# Patient Record
Sex: FEMALE | Race: WHITE | NOT HISPANIC OR LATINO | Employment: UNEMPLOYED | URBAN - NONMETROPOLITAN AREA
[De-identification: names, ages, dates, MRNs, and addresses within clinical notes are randomized per-mention and may not be internally consistent; named-entity substitution may affect disease eponyms.]

---

## 2023-10-26 ENCOUNTER — OFFICE VISIT (OUTPATIENT)
Dept: PULMONOLOGY | Facility: CLINIC | Age: 43
End: 2023-10-26
Payer: COMMERCIAL

## 2023-10-26 VITALS
BODY MASS INDEX: 40.02 KG/M2 | DIASTOLIC BLOOD PRESSURE: 62 MMHG | HEART RATE: 97 BPM | SYSTOLIC BLOOD PRESSURE: 108 MMHG | HEIGHT: 61 IN | WEIGHT: 212 LBS | TEMPERATURE: 98 F | OXYGEN SATURATION: 98 %

## 2023-10-26 DIAGNOSIS — Z23 IMMUNIZATION DUE: ICD-10-CM

## 2023-10-26 DIAGNOSIS — R06.02 SHORTNESS OF BREATH: Primary | ICD-10-CM

## 2023-10-26 DIAGNOSIS — F17.210 CIGARETTE NICOTINE DEPENDENCE WITHOUT COMPLICATION: ICD-10-CM

## 2023-10-26 RX ORDER — OFATUMUMAB 20 MG/.4ML
20 INJECTION, SOLUTION SUBCUTANEOUS
COMMUNITY
Start: 2023-10-09

## 2023-10-26 RX ORDER — TIZANIDINE 4 MG/1
TABLET ORAL
COMMUNITY

## 2023-10-26 RX ORDER — DALFAMPRIDINE 10 MG/1
10 TABLET, FILM COATED, EXTENDED RELEASE ORAL
COMMUNITY
Start: 2023-07-10

## 2023-10-26 RX ORDER — MELOXICAM 7.5 MG/1
7.5 TABLET ORAL DAILY
COMMUNITY
Start: 2023-10-03

## 2023-10-26 RX ORDER — FLUOXETINE HYDROCHLORIDE 20 MG/1
1 CAPSULE ORAL DAILY
COMMUNITY
Start: 2023-08-29

## 2023-10-26 RX ORDER — MODAFINIL 200 MG/1
200 TABLET ORAL DAILY
COMMUNITY
Start: 2023-10-09

## 2023-10-26 RX ORDER — POTASSIUM CHLORIDE 20 MEQ/1
20 TABLET, EXTENDED RELEASE ORAL DAILY
COMMUNITY
Start: 2023-10-20 | End: 2023-11-19

## 2023-10-26 RX ORDER — GABAPENTIN 800 MG/1
TABLET ORAL
COMMUNITY

## 2023-10-26 RX ORDER — DILTIAZEM HYDROCHLORIDE 60 MG/1
2 TABLET, FILM COATED ORAL
COMMUNITY
Start: 2023-09-14

## 2023-10-26 RX ORDER — CHOLECALCIFEROL (VITAMIN D3) 125 MCG
1 CAPSULE ORAL DAILY
COMMUNITY
Start: 2023-08-02

## 2023-10-26 RX ORDER — FUROSEMIDE 20 MG/1
20 TABLET ORAL DAILY
COMMUNITY
Start: 2023-10-20 | End: 2023-11-19

## 2023-10-26 RX ORDER — CARIPRAZINE 1.5 MG/1
CAPSULE, GELATIN COATED ORAL
COMMUNITY
Start: 2023-10-16

## 2023-10-26 RX ORDER — HYDROCHLOROTHIAZIDE 12.5 MG/1
12.5 CAPSULE, GELATIN COATED ORAL DAILY
COMMUNITY
Start: 2023-10-23

## 2023-10-26 RX ORDER — POTASSIUM CHLORIDE 20 MEQ/1
20 TABLET, EXTENDED RELEASE ORAL DAILY PRN
Qty: 15 TABLET | Refills: 2 | Status: SHIPPED | OUTPATIENT
Start: 2023-10-26

## 2023-10-26 RX ORDER — GALCANEZUMAB 120 MG/ML
120 INJECTION, SOLUTION SUBCUTANEOUS
COMMUNITY
Start: 2023-07-10

## 2023-10-26 RX ORDER — ISOSORBIDE MONONITRATE 30 MG/1
30 TABLET, EXTENDED RELEASE ORAL DAILY
COMMUNITY
Start: 2023-08-03

## 2023-10-26 RX ORDER — FUROSEMIDE 20 MG/1
20 TABLET ORAL DAILY PRN
Qty: 15 TABLET | Refills: 2 | Status: SHIPPED | OUTPATIENT
Start: 2023-10-26

## 2023-10-26 RX ORDER — AMITRIPTYLINE HYDROCHLORIDE 25 MG/1
25 TABLET, FILM COATED ORAL DAILY
COMMUNITY
Start: 2023-10-09

## 2023-10-26 RX ORDER — HYDROCODONE BITARTRATE AND ACETAMINOPHEN 5; 325 MG/1; MG/1
TABLET ORAL
COMMUNITY
Start: 2023-10-11

## 2023-10-26 RX ORDER — SOLIFENACIN SUCCINATE 10 MG/1
5 TABLET, FILM COATED ORAL DAILY
COMMUNITY

## 2023-10-26 RX ORDER — NALOXEGOL OXALATE 12.5 MG/1
1 TABLET, FILM COATED ORAL DAILY
COMMUNITY
Start: 2023-08-03

## 2023-10-26 RX ORDER — RIMEGEPANT SULFATE 75 MG/75MG
75 TABLET, ORALLY DISINTEGRATING ORAL
COMMUNITY
Start: 2023-10-09

## 2023-10-26 RX ORDER — BUSPIRONE HYDROCHLORIDE 5 MG/1
TABLET ORAL
COMMUNITY

## 2023-10-26 RX ORDER — VERAPAMIL HYDROCHLORIDE 120 MG/1
120 TABLET, FILM COATED ORAL 3 TIMES DAILY
COMMUNITY

## 2023-10-26 RX ORDER — ALBUTEROL SULFATE 90 UG/1
1-2 AEROSOL, METERED RESPIRATORY (INHALATION) EVERY 6 HOURS PRN
COMMUNITY
Start: 2023-08-14

## 2023-10-26 RX ORDER — VENLAFAXINE 100 MG/1
100 TABLET ORAL
COMMUNITY

## 2023-10-26 RX ORDER — AMANTADINE HYDROCHLORIDE 100 MG/1
100 TABLET ORAL 2 TIMES DAILY
COMMUNITY

## 2023-10-26 NOTE — PROGRESS NOTES
"Chief Complaint  Shortness of Breath    Subjective        Ninoska Stock presents to [facility] PULMONARY & CRITICAL CARE MEDICINE  History of Present Illness    Mrs. Stock presents today for evaluation of shortness of breath. She has been notable for symptoms for the past year without specific answers yet. She has been considered for COPD in the past as well as asthma. She is on Symbicort (does not always use the full dose of this, typically 1 puff) and albuterol PRN without relief.   She is ntoable for history of MS but states that this is in remission.   Also notable for current smoking history, no benefit from patches   Upon severe trouble around 10/19, she sought ED evaluation. Diagnosed with pulmonary edema after imaging and other evaluation. Still notes smothering on a normal basis even with sitting and talking. Takes Lasix 20 mg every day. Did notice increased urine output at first with use, but believes this has slowed down over time.   Feels that she does have some swelling in her hands today.       Objective   Vital Signs:  /62 (BP Location: Left arm, Patient Position: Sitting)   Pulse 97   Temp 98 °F (36.7 °C)   Ht 154.9 cm (61\")   Wt 96.2 kg (212 lb)   SpO2 98%   BMI 40.06 kg/m²   Estimated body mass index is 40.06 kg/m² as calculated from the following:    Height as of this encounter: 154.9 cm (61\").    Weight as of this encounter: 96.2 kg (212 lb).         Physical Exam  Vitals reviewed.   Constitutional:       General: She is not in acute distress.     Appearance: She is well-developed. She is not diaphoretic.   HENT:      Head: Normocephalic and atraumatic.   Cardiovascular:      Rate and Rhythm: Normal rate and regular rhythm.   Pulmonary:      Effort: Pulmonary effort is normal.      Breath sounds: No wheezing, rhonchi or rales.   Neurological:      Mental Status: She is alert and oriented to person, place, and time.   Psychiatric:         Behavior: Behavior normal. "        Result Review :  The following data was reviewed by: Hannah Miller PA-C on 10/26/2023:    Reviewed the ED summary & labs from October 19, 2023.   proBNP was slightly elevated at that time.     Reviewed the CT chest report from October 2023    Reviewed the echo report from October 2023.        Assessment and Plan   Diagnoses and all orders for this visit:    1. Shortness of breath (Primary)  -     Complete PFT - Pre & Post Bronchodilator; Future    2. Immunization due  -     Fluzone (or Fluarix & Flulaval for VFC) >6 Mos ([id number])    3. Cigarette nicotine dependence without complication    Other orders  -     furosemide (Lasix) 20 MG tablet; Take 1 tablet by mouth Daily As Needed (shortness of breath/swelling). Try to rotate every other day if needed. Do not take if BP is less than 100/60  Dispense: 15 tablet; Refill: 2  -     potassium chloride (K-DUR,KLOR-CON) 20 MEQ CR tablet; Take 1 tablet by mouth Daily As Needed (Shortness of breath/swelling). Take one (20 mEq) tablet per one additional Lasix 20 mg tablet (in addition to your regular regimen).  Dispense: 15 tablet; Refill: 2      Shortness of breath:   Could be multifactorial. Unknown yet if there is true asthma and/or COPD component (not mentioned by CT report).   Continue albuterol inhaler as needed  Continue duonebs as needed  Previously on Symbicort 80 mcg but not using the full dose.   Since sample is available today, will try Breztri 2 puffs, twice daily use.   Will see if higher steroid + other components are more beneficial.   Also notes benefit from Diuretics typically.   Does follow with cardiology.   On 20 mg lasix daily  Wrote additional Lasix 20 mg to take daily PRN as previous chest CT suggested pulmonary edema in the setting of moderate valve stenosis and grade I diastolic dysfunction.   Saturation appropriate at room air today  Will try to get imaging disc from [facility]  May consider alpha 1 testing at the next visit - emphysema  changes not mentioned on the CT report.       Cigarette dependence:   Remains a current smoker. Has tried patches but unsuccessfully yet. Hopes to be able to quit independently in the near future.   Will be glad to support as needed.       Received her influenza immunization today.       Follow Up   Return in about 4 weeks (around 11/23/2023), or if symptoms worsen or fail to improve, for Next scheduled follow up.  Patient was given instructions and counseling regarding her condition or for health maintenance advice. Please see specific information pulled into the AVS if appropriate.

## 2023-12-11 ENCOUNTER — HOSPITAL ENCOUNTER (OUTPATIENT)
Dept: RESPIRATORY THERAPY | Facility: HOSPITAL | Age: 43
Discharge: HOME | End: 2023-12-11
Admitting: PHYSICIAN ASSISTANT
Payer: MEDICARE

## 2023-12-11 VITALS — RESPIRATION RATE: 16 BRPM | HEART RATE: 95 BPM | OXYGEN SATURATION: 97 %

## 2023-12-11 DIAGNOSIS — R06.02 SHORTNESS OF BREATH: ICD-10-CM

## 2023-12-11 PROCEDURE — 94799 UNLISTED PULMONARY SVC/PX: CPT

## 2023-12-11 PROCEDURE — 94726 PLETHYSMOGRAPHY LUNG VOLUMES: CPT

## 2023-12-11 PROCEDURE — 94760 N-INVAS EAR/PLS OXIMETRY 1: CPT

## 2023-12-11 PROCEDURE — 94729 DIFFUSING CAPACITY: CPT

## 2023-12-11 PROCEDURE — 94060 EVALUATION OF WHEEZING: CPT

## 2023-12-11 PROCEDURE — 94640 AIRWAY INHALATION TREATMENT: CPT

## 2023-12-11 RX ORDER — ALBUTEROL SULFATE 2.5 MG/3ML
2.5 SOLUTION RESPIRATORY (INHALATION) ONCE
Status: COMPLETED | OUTPATIENT
Start: 2023-12-11 | End: 2023-12-11

## 2023-12-11 RX ADMIN — ALBUTEROL SULFATE 2.5 MG: 2.5 SOLUTION RESPIRATORY (INHALATION) at 14:50

## 2023-12-18 ENCOUNTER — OFFICE VISIT (OUTPATIENT)
Dept: PULMONOLOGY | Facility: CLINIC | Age: 43
End: 2023-12-18
Payer: MEDICARE

## 2023-12-18 VITALS
TEMPERATURE: 97.7 F | HEIGHT: 61 IN | WEIGHT: 212 LBS | BODY MASS INDEX: 40.02 KG/M2 | SYSTOLIC BLOOD PRESSURE: 152 MMHG | HEART RATE: 112 BPM | DIASTOLIC BLOOD PRESSURE: 82 MMHG | OXYGEN SATURATION: 95 %

## 2023-12-18 DIAGNOSIS — F17.210 CIGARETTE NICOTINE DEPENDENCE WITHOUT COMPLICATION: ICD-10-CM

## 2023-12-18 DIAGNOSIS — R06.02 SHORTNESS OF BREATH: Primary | ICD-10-CM

## 2023-12-18 DIAGNOSIS — R93.89 ABNORMAL CT OF THE CHEST: ICD-10-CM

## 2023-12-18 PROCEDURE — 1160F RVW MEDS BY RX/DR IN RCRD: CPT | Performed by: PHYSICIAN ASSISTANT

## 2023-12-18 PROCEDURE — 99214 OFFICE O/P EST MOD 30 MIN: CPT | Performed by: PHYSICIAN ASSISTANT

## 2023-12-18 PROCEDURE — 1159F MED LIST DOCD IN RCRD: CPT | Performed by: PHYSICIAN ASSISTANT

## 2023-12-18 RX ORDER — FLUTICASONE PROPIONATE 220 UG/1
2 AEROSOL, METERED RESPIRATORY (INHALATION)
Qty: 12 G | Refills: 5 | Status: SHIPPED | OUTPATIENT
Start: 2023-12-18

## 2023-12-18 NOTE — PROGRESS NOTES
"Chief Complaint  Shortness of Breath    Subjective        Ninoska Stock presents to [facility] PULMONARY & CRITICAL CARE MEDICINE  History of Present Illness    Mrs. Stock presents today for follow up. Still having shortness of breath, increased with exertion. Does find some benefit from albuterol, but does not feel that Symbicort is working. Did complete the previously ordered additional diuretic medication but not sure that she noticed significant benefit from this.   She had went to the ED at that time (in October) and diagnosed with pulmonary edema). Imaging report suggested bilateral ground glass opacities, which were noted diffusely (right greater than left) per imaging review and could also be concerning for infectious/inflammatory process.   She has been told she had suspected asthma/copd during other respiratory episode(s) in the past.   History also notable for MSKrystle   Will be following up with her PCP in the next month.       Objective   Vital Signs:  /82   Pulse 112   Temp 97.7 °F (36.5 °C)   Ht 154.9 cm (60.98\")   Wt 96.2 kg (212 lb)   SpO2 95%   BMI 40.08 kg/m²   Estimated body mass index is 40.08 kg/m² as calculated from the following:    Height as of this encounter: 154.9 cm (60.98\").    Weight as of this encounter: 96.2 kg (212 lb).         Physical Exam  Vitals reviewed.   Constitutional:       General: She is not in acute distress.     Appearance: She is well-developed. She is not diaphoretic.   HENT:      Head: Normocephalic and atraumatic.   Cardiovascular:      Rate and Rhythm: Normal rate and regular rhythm.   Pulmonary:      Effort: Pulmonary effort is normal.      Breath sounds: No wheezing, rhonchi or rales.   Neurological:      Mental Status: She is alert and oriented to person, place, and time.   Psychiatric:         Behavior: Behavior normal.        Result Review :  The following data was reviewed by: Hannah Miller PA-C on 12/18/2023:  PFT results " (December 2023) - normal spirometry, no significant bronchodilator response, DLCO normal, lung volumes normal. Flow volume loop normal.   CT report/imaging (acquired recently) (October 2023)    Echo results (July 2023)  CT chest report (March 2023)          CT chest images (October 2023)              Assessment and Plan   Diagnoses and all orders for this visit:    1. Shortness of breath (Primary)  -     CT Chest Without Contrast; Future    2. Abnormal CT of the chest  -     CT Chest Without Contrast; Future    3. Cigarette nicotine dependence without complication    Other orders  -     fluticasone (FLOVENT HFA) 220 MCG/ACT inhaler; Inhale 2 puffs 2 (Two) Times a Day.  Dispense: 12 g; Refill: 5  -     tiotropium bromide-olodaterol (STIOLTO RESPIMAT) 2.5-2.5 MCG/ACT aerosol solution inhaler; Inhale 2 puffs Daily.  Dispense: 4 g; Refill: 8        Shortness of breath, Abnormal CT chest:   Notable for shortness of breath requiring ED evaluation in March and October. CT imaging at that time had similar reports. Imaging finally acquired of the October imaging.   Concerning for acute inflammatory/infectious, edema or chroinc ILD process.   Still having symptoms at this time.   Will obtain follow up CT chest to assess for residual or resolution of previous findings.   PFT was normal.   Can continue inhalers at this time as they do seem to help more with current ongoing symptoms.   Continue with Flovent  Continue with Stiolto  Few emphysema changes noted on imaging, but without true obstruction on PFT.   Does still have diuretics to take as needed.   Did not recall significant symptom benefit after previous use but may still be useful PRN.       Cigarette dependence:   Has tried methods to achieve cessation but unsuccessful. However, she has been able to decrease to minimal use of 3-4 cigarettes per day.       Follow Up   Return in about 6 weeks (around 1/29/2024), or if symptoms worsen or fail to improve, for  Recheck.  Patient was given instructions and counseling regarding her condition or for health maintenance advice. Please see specific information pulled into the AVS if appropriate.

## 2023-12-22 PROBLEM — R93.89 ABNORMAL CT OF THE CHEST: Status: ACTIVE | Noted: 2023-12-22

## 2023-12-22 PROBLEM — F17.210 CIGARETTE NICOTINE DEPENDENCE WITHOUT COMPLICATION: Status: ACTIVE | Noted: 2023-12-22

## 2024-01-23 ENCOUNTER — HOSPITAL ENCOUNTER (OUTPATIENT)
Facility: HOSPITAL | Age: 44
Discharge: HOME | End: 2024-01-23
Admitting: PHYSICIAN ASSISTANT
Payer: MEDICARE

## 2024-01-23 DIAGNOSIS — R06.02 SHORTNESS OF BREATH: ICD-10-CM

## 2024-01-23 DIAGNOSIS — R93.89 ABNORMAL CT OF THE CHEST: ICD-10-CM

## 2024-01-23 PROCEDURE — 71250 CT THORAX DX C-: CPT

## 2024-01-23 PROCEDURE — 71250 CT THORAX DX C-: CPT | Performed by: RADIOLOGY

## 2024-01-30 ENCOUNTER — OFFICE VISIT (OUTPATIENT)
Dept: PULMONOLOGY | Facility: CLINIC | Age: 44
End: 2024-01-30
Payer: MEDICARE

## 2024-01-30 VITALS
HEART RATE: 113 BPM | OXYGEN SATURATION: 97 % | SYSTOLIC BLOOD PRESSURE: 118 MMHG | TEMPERATURE: 97.1 F | WEIGHT: 210 LBS | DIASTOLIC BLOOD PRESSURE: 72 MMHG | BODY MASS INDEX: 39.65 KG/M2 | HEIGHT: 61 IN

## 2024-01-30 DIAGNOSIS — R06.02 SHORTNESS OF BREATH: Primary | ICD-10-CM

## 2024-01-30 DIAGNOSIS — G47.10 HYPERSOMNIA: ICD-10-CM

## 2024-01-30 PROCEDURE — 94618 PULMONARY STRESS TESTING: CPT | Performed by: PHYSICIAN ASSISTANT

## 2024-01-30 PROCEDURE — 1160F RVW MEDS BY RX/DR IN RCRD: CPT | Performed by: PHYSICIAN ASSISTANT

## 2024-01-30 PROCEDURE — 1159F MED LIST DOCD IN RCRD: CPT | Performed by: PHYSICIAN ASSISTANT

## 2024-01-30 PROCEDURE — 99214 OFFICE O/P EST MOD 30 MIN: CPT | Performed by: PHYSICIAN ASSISTANT

## 2024-01-30 RX ORDER — FLUOXETINE HYDROCHLORIDE 40 MG/1
1 CAPSULE ORAL DAILY
COMMUNITY

## 2024-01-30 RX ORDER — VENLAFAXINE HYDROCHLORIDE 150 MG/1
CAPSULE, EXTENDED RELEASE ORAL
COMMUNITY

## 2024-01-30 RX ORDER — ALBUTEROL SULFATE 90 UG/1
2 AEROSOL, METERED RESPIRATORY (INHALATION) EVERY 4 HOURS PRN
Qty: 18 G | Refills: 8 | Status: SHIPPED | OUTPATIENT
Start: 2024-01-30

## 2024-01-30 RX ORDER — FLUTICASONE PROPIONATE 220 UG/1
2 AEROSOL, METERED RESPIRATORY (INHALATION)
Qty: 12 G | Refills: 5 | Status: SHIPPED | OUTPATIENT
Start: 2024-01-30

## 2024-02-13 PROBLEM — G47.10 HYPERSOMNIA: Status: ACTIVE | Noted: 2024-02-13

## 2024-03-12 ENCOUNTER — TELEPHONE (OUTPATIENT)
Dept: PULMONOLOGY | Facility: CLINIC | Age: 44
End: 2024-03-12
Payer: MEDICARE

## 2024-03-12 DIAGNOSIS — G47.33 OSA (OBSTRUCTIVE SLEEP APNEA): Primary | ICD-10-CM

## 2024-03-12 NOTE — TELEPHONE ENCOUNTER
Updated with sleep study results.   Mild sleep apnea noted.   Will start with autopap trial, and follow-up in 1 month.

## 2025-07-29 ENCOUNTER — HOSPITAL ENCOUNTER (INPATIENT)
Facility: HOSPITAL | Age: 45
DRG: 536 | End: 2025-07-29
Attending: INTERNAL MEDICINE | Admitting: INTERNAL MEDICINE
Payer: MEDICARE

## 2025-07-29 ENCOUNTER — PREP FOR SURGERY (OUTPATIENT)
Dept: OTHER | Facility: HOSPITAL | Age: 45
End: 2025-07-29
Payer: MEDICARE

## 2025-07-29 DIAGNOSIS — T14.90XA TRAUMA: ICD-10-CM

## 2025-07-29 DIAGNOSIS — G47.10 HYPERSOMNIA: Primary | ICD-10-CM

## 2025-07-29 PROCEDURE — 25010000002 HEPARIN (PORCINE) PER 1000 UNITS: Performed by: INTERNAL MEDICINE

## 2025-07-29 RX ORDER — MODAFINIL 100 MG/1
200 TABLET ORAL DAILY
Status: CANCELLED | OUTPATIENT
Start: 2025-07-30 | End: 2025-08-04

## 2025-07-29 RX ORDER — LANOLIN ALCOHOL/MO/W.PET/CERES
1000 CREAM (GRAM) TOPICAL DAILY
Status: CANCELLED | OUTPATIENT
Start: 2025-07-30

## 2025-07-29 RX ORDER — BUDESONIDE, GLYCOPYRROLATE, AND FORMOTEROL FUMARATE 160; 9; 4.8 UG/1; UG/1; UG/1
2 AEROSOL, METERED RESPIRATORY (INHALATION) 2 TIMES DAILY
Status: ON HOLD | COMMUNITY
End: 2025-07-30

## 2025-07-29 RX ORDER — TIRZEPATIDE 2.5 MG/.5ML
2.5 INJECTION, SOLUTION SUBCUTANEOUS WEEKLY
Status: ON HOLD | COMMUNITY
End: 2025-07-30

## 2025-07-29 RX ORDER — OMEGA-3S/DHA/EPA/FISH OIL/D3 300MG-1000
1000 CAPSULE ORAL DAILY
Status: DISCONTINUED | OUTPATIENT
Start: 2025-07-30 | End: 2025-07-29

## 2025-07-29 RX ORDER — OXYCODONE HYDROCHLORIDE 10 MG/1
10 TABLET ORAL EVERY 6 HOURS PRN
Refills: 0 | Status: CANCELLED | OUTPATIENT
Start: 2025-07-29 | End: 2025-08-05

## 2025-07-29 RX ORDER — ACETAMINOPHEN 500 MG
1000 TABLET ORAL EVERY 6 HOURS PRN
Status: CANCELLED | OUTPATIENT
Start: 2025-07-29

## 2025-07-29 RX ORDER — HYDROXYZINE HYDROCHLORIDE 25 MG/1
25 TABLET, FILM COATED ORAL EVERY 6 HOURS PRN
Status: CANCELLED | OUTPATIENT
Start: 2025-07-29

## 2025-07-29 RX ORDER — BUSPIRONE HYDROCHLORIDE 10 MG/1
10 TABLET ORAL EVERY 12 HOURS SCHEDULED
Status: DISPENSED | OUTPATIENT
Start: 2025-07-29

## 2025-07-29 RX ORDER — VERAPAMIL HYDROCHLORIDE 120 MG/1
120 TABLET, FILM COATED, EXTENDED RELEASE ORAL
Status: CANCELLED | OUTPATIENT
Start: 2025-07-30

## 2025-07-29 RX ORDER — CARIPRAZINE 6 MG/1
1 CAPSULE, GELATIN COATED ORAL DAILY
Status: ON HOLD | COMMUNITY
End: 2025-07-30

## 2025-07-29 RX ORDER — ALBUTEROL SULFATE 90 UG/1
2 INHALANT RESPIRATORY (INHALATION) EVERY 6 HOURS PRN
Status: CANCELLED | OUTPATIENT
Start: 2025-07-29

## 2025-07-29 RX ORDER — NALOXONE HCL 0.4 MG/ML
0.4 VIAL (ML) INJECTION
Status: CANCELLED | OUTPATIENT
Start: 2025-07-29

## 2025-07-29 RX ORDER — ALBUTEROL SULFATE 0.83 MG/ML
2.5 SOLUTION RESPIRATORY (INHALATION) EVERY 6 HOURS PRN
Status: ACTIVE | OUTPATIENT
Start: 2025-07-29

## 2025-07-29 RX ORDER — ACETAMINOPHEN 500 MG
1000 TABLET ORAL EVERY 6 HOURS PRN
Status: DISPENSED | OUTPATIENT
Start: 2025-07-29

## 2025-07-29 RX ORDER — BUSPIRONE HYDROCHLORIDE 10 MG/1
10 TABLET ORAL 2 TIMES DAILY
Status: ON HOLD | COMMUNITY
End: 2025-07-30

## 2025-07-29 RX ORDER — VERAPAMIL HYDROCHLORIDE 120 MG/1
120 TABLET, FILM COATED, EXTENDED RELEASE ORAL DAILY
Status: ON HOLD | COMMUNITY
End: 2025-07-30

## 2025-07-29 RX ORDER — HYDROCHLOROTHIAZIDE 25 MG/1
25 TABLET ORAL DAILY
Status: DISPENSED | OUTPATIENT
Start: 2025-07-30

## 2025-07-29 RX ORDER — VERAPAMIL HYDROCHLORIDE 120 MG/1
120 TABLET, FILM COATED, EXTENDED RELEASE ORAL
Status: DISPENSED | OUTPATIENT
Start: 2025-07-30

## 2025-07-29 RX ORDER — AMOXICILLIN 250 MG
2 CAPSULE ORAL 2 TIMES DAILY
Status: CANCELLED | OUTPATIENT
Start: 2025-07-29

## 2025-07-29 RX ORDER — HEPARIN SODIUM 5000 [USP'U]/ML
5000 INJECTION, SOLUTION INTRAVENOUS; SUBCUTANEOUS EVERY 8 HOURS SCHEDULED
Status: CANCELLED | OUTPATIENT
Start: 2025-07-29

## 2025-07-29 RX ORDER — HYDROCHLOROTHIAZIDE 25 MG/1
25 TABLET ORAL DAILY
Status: CANCELLED | OUTPATIENT
Start: 2025-07-30

## 2025-07-29 RX ORDER — POLYETHYLENE GLYCOL 3350 17 G/17G
17 POWDER, FOR SOLUTION ORAL DAILY
Status: DISPENSED | OUTPATIENT
Start: 2025-07-30

## 2025-07-29 RX ORDER — AMOXICILLIN 250 MG
2 CAPSULE ORAL 2 TIMES DAILY
Status: DISPENSED | OUTPATIENT
Start: 2025-07-29

## 2025-07-29 RX ORDER — METHOCARBAMOL 500 MG/1
500 TABLET, FILM COATED ORAL EVERY 6 HOURS PRN
Status: DISPENSED | OUTPATIENT
Start: 2025-07-29

## 2025-07-29 RX ORDER — HEPARIN SODIUM 5000 [USP'U]/ML
5000 INJECTION, SOLUTION INTRAVENOUS; SUBCUTANEOUS EVERY 8 HOURS SCHEDULED
Status: DISPENSED | OUTPATIENT
Start: 2025-07-29

## 2025-07-29 RX ORDER — MODAFINIL 100 MG/1
200 TABLET ORAL DAILY
Status: DISCONTINUED | OUTPATIENT
Start: 2025-07-30 | End: 2025-08-02

## 2025-07-29 RX ORDER — LANOLIN ALCOHOL/MO/W.PET/CERES
1000 CREAM (GRAM) TOPICAL DAILY
Status: DISPENSED | OUTPATIENT
Start: 2025-07-30

## 2025-07-29 RX ORDER — GABAPENTIN 400 MG/1
800 CAPSULE ORAL EVERY 8 HOURS SCHEDULED
Status: DISPENSED | OUTPATIENT
Start: 2025-07-29

## 2025-07-29 RX ORDER — GABAPENTIN 400 MG/1
800 CAPSULE ORAL EVERY 8 HOURS SCHEDULED
Status: CANCELLED | OUTPATIENT
Start: 2025-07-29

## 2025-07-29 RX ORDER — ALBUTEROL SULFATE 90 UG/1
2 INHALANT RESPIRATORY (INHALATION) EVERY 6 HOURS PRN
Status: ACTIVE | OUTPATIENT
Start: 2025-07-29

## 2025-07-29 RX ORDER — NALOXONE HCL 0.4 MG/ML
0.4 VIAL (ML) INJECTION
Status: ACTIVE | OUTPATIENT
Start: 2025-07-29

## 2025-07-29 RX ORDER — HYDROCHLOROTHIAZIDE 25 MG/1
25 TABLET ORAL DAILY
Status: ON HOLD | COMMUNITY
End: 2025-07-30

## 2025-07-29 RX ORDER — OXYCODONE HYDROCHLORIDE 10 MG/1
10 TABLET ORAL EVERY 6 HOURS PRN
Status: DISPENSED | OUTPATIENT
Start: 2025-07-29 | End: 2025-08-05

## 2025-07-29 RX ORDER — METHOCARBAMOL 500 MG/1
500 TABLET, FILM COATED ORAL EVERY 6 HOURS PRN
Status: CANCELLED | OUTPATIENT
Start: 2025-07-29

## 2025-07-29 RX ORDER — FREMANEZUMAB-VFRM 225 MG/1.5ML
225 INJECTION SUBCUTANEOUS
Status: ON HOLD | COMMUNITY
End: 2025-07-30

## 2025-07-29 RX ORDER — POLYETHYLENE GLYCOL 3350 17 G/17G
17 POWDER, FOR SOLUTION ORAL DAILY
Status: CANCELLED | OUTPATIENT
Start: 2025-07-30

## 2025-07-29 RX ORDER — CHOLECALCIFEROL (VITAMIN D3) 25 MCG
1000 TABLET ORAL DAILY
Status: CANCELLED | OUTPATIENT
Start: 2025-07-30

## 2025-07-29 RX ORDER — BUDESONIDE AND FORMOTEROL FUMARATE DIHYDRATE 160; 4.5 UG/1; UG/1
2 AEROSOL RESPIRATORY (INHALATION)
Status: ACTIVE | OUTPATIENT
Start: 2025-07-30

## 2025-07-29 RX ORDER — BUSPIRONE HYDROCHLORIDE 10 MG/1
10 TABLET ORAL EVERY 12 HOURS SCHEDULED
Status: CANCELLED | OUTPATIENT
Start: 2025-07-29

## 2025-07-29 RX ORDER — HYDROXYZINE HYDROCHLORIDE 25 MG/1
25 TABLET, FILM COATED ORAL EVERY 6 HOURS PRN
Status: DISPENSED | OUTPATIENT
Start: 2025-07-29

## 2025-07-29 RX ADMIN — HEPARIN SODIUM 5000 UNITS: 5000 INJECTION INTRAVENOUS; SUBCUTANEOUS at 21:08

## 2025-07-29 RX ADMIN — GABAPENTIN 800 MG: 400 CAPSULE ORAL at 21:08

## 2025-07-29 RX ADMIN — DOCUSATE SODIUM AND SENNOSIDES 2 TABLET: 8.6; 5 TABLET, FILM COATED ORAL at 21:08

## 2025-07-29 RX ADMIN — OXYCODONE HYDROCHLORIDE 10 MG: 10 TABLET ORAL at 19:10

## 2025-07-29 RX ADMIN — AMITRIPTYLINE HYDROCHLORIDE 25 MG: 25 TABLET ORAL at 21:08

## 2025-07-29 RX ADMIN — BUSPIRONE HYDROCHLORIDE 10 MG: 10 TABLET ORAL at 21:08
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
## 2025-07-29 NOTE — PROGRESS NOTES
Problems/Goals  Skin Integrity (Body Function Structure)  Current Status: risk for impaired skin integrity  Long Term Goals  07/29/2025 06:04 PM - Active  no skin breakdown  Potential for Injury (Safety)  Current Status: risk for falls  Short Term Goals  07/29/2025 06:06 PM - Active  no falls  Long Term Goals  07/29/2025 06:05 PM - Active  no falls    Signed by: Mee Garza, Nurse

## 2025-07-29 NOTE — PROGRESS NOTES
"Section A. Administrative Information - Admission    Ethnicity: A. Not of , /a, or [place] origin    Race:  A. White, B. Black or     A. Preferred Language:  English    B.  Does patient need or want an  to communicate with a doctor or  health care staff?  0. No        Section B.  Hearing, Speech, and Vision - Admission  . Hearin. Adequate - no difficulty in normal conversation, social interaction,  listening to TV    . Vision:  0. Adequate - sees fine detail, such as regular print in newspapers/books    . Health Literacy - Frequency of requiring assistance reading instructions,  pamphlets, other written material from doctor or pharmacy:  0. Never    [id number]. Expression of Ideas and Wants:  4. Expresses complex messages without difficulty and with speech that is clear  and easy to understand    [id number]. Understanding Verbal and Non-Verbal Content:  4. Understands: Clear comprehension without cues or repetitions    Section C.  Cognitive Patterns - Admission  . Should Brief Interview for Mental Status (-) be conducted?  (1) Yes        Number of words repeated by patient after first attempt:  3. Three        A.  ?Please tell me what year it is right now.?    A. Able to report correct year:  3. Correct    B.  ?What month are we in right now??    B. Able to report correct month:  2. Accurate within 5 days    C.  ?What day of the week is today??    C. Able to report correct day of the week:  1. Correct            A.  Able to recall ?sock?:  1. Yes, after cueing ('something to wear\")    B.  Able to recall ?blue?  2. Yes, no cue required    C.  Able to recall ?bed?:  1. Yes, after cueing (\"a piece of furniture\")        BIMS Score:  13    Code: 0    Description: Patient was able to complete BIMS.    A. Is there evidence of an acute change in mental status from the patient's  baseline?  0. No    B. Inattention:  0. Behavior not present    C. Disorganized " "thinkin. Behavior not present    D. Altered level of consciousness:  0. Behavior not present    Section D. Mood - Admission  \"Over the last 2 weeks, have you been bothered by any of the following  problems?\"    . Patient Mood Interview (PHQ-2 to 9) (from [employer]?)                            1. Symptom Presence       2. Symptom Frequency  A. Little interest or pleasure in doing things  0. No                     0.  Never or 1 day  B. Feeling down, depressed, or hopeless  0. No                     0. Never or 1  day            PHQ interview ended, as above answers do not meet criteria to continue    . Total Severity Score: 0    Interpretation: Minimal depression    How often do you feel lonely or isolated from those around you?  1. Rarely    Section J.  Health Conditions - Admission  . Pain Effect on Sleep - ?Over the past 5 days, how much of the time has  pain made it hard for you to sleep at night??:  4. Almost constantly    . Pain Interference with Therapy Activities - ?Over the past 5 days, how  often have you limited your participation in rehabilitation therapy sessions due  to pain?\":  1. Rarely or not at all    . Pain Interference with Day-to-Day Activities - ?Over the past 5 days, how  often have you limited your day-to-day activities (excluding rehabilitation  therapy sessions) because of pain??:  4. Almost constantly    Section M. Skin Conditions - Admission  Does this patient have one or more unhealed pressure ulcers/injuries?  0. No    Signed by: Mee Garza Nurse    "

## 2025-07-29 NOTE — PLAN OF CARE
Goal Outcome Evaluation:  Plan of Care Reviewed With: patient        Progress: improving  Outcome Summary: patient admitted to rehab unit. begin plan of care.
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
## 2025-07-30 LAB
ALBUMIN SERPL-MCNC: 3.9 G/DL (ref 3.5–5.2)
ALBUMIN/GLOB SERPL: 1.4 G/DL
ALP SERPL-CCNC: 107 U/L (ref 39–117)
ALT SERPL W P-5'-P-CCNC: 18 U/L (ref 1–33)
ANION GAP SERPL CALCULATED.3IONS-SCNC: 11.7 MMOL/L (ref 5–15)
AST SERPL-CCNC: 20 U/L (ref 1–32)
BASOPHILS # BLD AUTO: 0.03 10*3/MM3 (ref 0–0.2)
BASOPHILS NFR BLD AUTO: 0.3 % (ref 0–1.5)
BILIRUB SERPL-MCNC: 0.3 MG/DL (ref 0–1.2)
BUN SERPL-MCNC: 9.2 MG/DL (ref 6–20)
BUN/CREAT SERPL: 14.4 (ref 7–25)
CALCIUM SPEC-SCNC: 9.2 MG/DL (ref 8.6–10.5)
CHLORIDE SERPL-SCNC: 101 MMOL/L (ref 98–107)
CO2 SERPL-SCNC: 25.3 MMOL/L (ref 22–29)
CREAT SERPL-MCNC: 0.64 MG/DL (ref 0.57–1)
DEPRECATED RDW RBC AUTO: 55.2 FL (ref 37–54)
EGFRCR SERPLBLD CKD-EPI 2021: 111.9 ML/MIN/1.73
EOSINOPHIL # BLD AUTO: 0.03 10*3/MM3 (ref 0–0.4)
EOSINOPHIL NFR BLD AUTO: 0.3 % (ref 0.3–6.2)
ERYTHROCYTE [DISTWIDTH] IN BLOOD BY AUTOMATED COUNT: 16.1 % (ref 12.3–15.4)
GLOBULIN UR ELPH-MCNC: 2.7 GM/DL
GLUCOSE SERPL-MCNC: 110 MG/DL (ref 65–99)
HCT VFR BLD AUTO: 31.5 % (ref 34–46.6)
HGB BLD-MCNC: 9.5 G/DL (ref 12–15.9)
IMM GRANULOCYTES # BLD AUTO: 0.03 10*3/MM3 (ref 0–0.05)
IMM GRANULOCYTES NFR BLD AUTO: 0.3 % (ref 0–0.5)
LYMPHOCYTES # BLD AUTO: 2.53 10*3/MM3 (ref 0.7–3.1)
LYMPHOCYTES NFR BLD AUTO: 26.1 % (ref 19.6–45.3)
MCH RBC QN AUTO: 28.7 PG (ref 26.6–33)
MCHC RBC AUTO-ENTMCNC: 30.2 G/DL (ref 31.5–35.7)
MCV RBC AUTO: 95.2 FL (ref 79–97)
MONOCYTES # BLD AUTO: 0.95 10*3/MM3 (ref 0.1–0.9)
MONOCYTES NFR BLD AUTO: 9.8 % (ref 5–12)
NEUTROPHILS NFR BLD AUTO: 6.11 10*3/MM3 (ref 1.7–7)
NEUTROPHILS NFR BLD AUTO: 63.2 % (ref 42.7–76)
NRBC BLD AUTO-RTO: 0 /100 WBC (ref 0–0.2)
PLATELET # BLD AUTO: 527 10*3/MM3 (ref 140–450)
PMV BLD AUTO: 9.4 FL (ref 6–12)
POTASSIUM SERPL-SCNC: 4.6 MMOL/L (ref 3.5–5.2)
PROT SERPL-MCNC: 6.6 G/DL (ref 6–8.5)
RBC # BLD AUTO: 3.31 10*6/MM3 (ref 3.77–5.28)
SODIUM SERPL-SCNC: 138 MMOL/L (ref 136–145)
WBC NRBC COR # BLD AUTO: 9.68 10*3/MM3 (ref 3.4–10.8)

## 2025-07-30 PROCEDURE — 97166 OT EVAL MOD COMPLEX 45 MIN: CPT

## 2025-07-30 PROCEDURE — 99223 1ST HOSP IP/OBS HIGH 75: CPT | Performed by: INTERNAL MEDICINE

## 2025-07-30 PROCEDURE — 97110 THERAPEUTIC EXERCISES: CPT

## 2025-07-30 PROCEDURE — 85025 COMPLETE CBC W/AUTO DIFF WBC: CPT | Performed by: INTERNAL MEDICINE

## 2025-07-30 PROCEDURE — 94799 UNLISTED PULMONARY SVC/PX: CPT

## 2025-07-30 PROCEDURE — 25010000002 HEPARIN (PORCINE) PER 1000 UNITS: Performed by: INTERNAL MEDICINE

## 2025-07-30 PROCEDURE — 94664 DEMO&/EVAL PT USE INHALER: CPT

## 2025-07-30 PROCEDURE — 97535 SELF CARE MNGMENT TRAINING: CPT

## 2025-07-30 PROCEDURE — 97116 GAIT TRAINING THERAPY: CPT

## 2025-07-30 PROCEDURE — 97161 PT EVAL LOW COMPLEX 20 MIN: CPT

## 2025-07-30 PROCEDURE — 94640 AIRWAY INHALATION TREATMENT: CPT

## 2025-07-30 PROCEDURE — 94760 N-INVAS EAR/PLS OXIMETRY 1: CPT

## 2025-07-30 PROCEDURE — 80053 COMPREHEN METABOLIC PANEL: CPT | Performed by: INTERNAL MEDICINE

## 2025-07-30 PROCEDURE — 97530 THERAPEUTIC ACTIVITIES: CPT

## 2025-07-30 RX ORDER — BUSPIRONE HYDROCHLORIDE 10 MG/1
10 TABLET ORAL 2 TIMES DAILY
Status: ON HOLD | COMMUNITY

## 2025-07-30 RX ORDER — OFATUMUMAB 20 MG/.4ML
0.4 INJECTION, SOLUTION SUBCUTANEOUS
Status: ON HOLD | COMMUNITY

## 2025-07-30 RX ORDER — GABAPENTIN 800 MG/1
800 TABLET ORAL 4 TIMES DAILY
Status: ON HOLD | COMMUNITY

## 2025-07-30 RX ORDER — MELOXICAM 7.5 MG/1
7.5 TABLET ORAL DAILY
Status: DISPENSED | OUTPATIENT
Start: 2025-07-30

## 2025-07-30 RX ORDER — MODAFINIL 200 MG/1
200 TABLET ORAL DAILY
Status: ON HOLD | COMMUNITY

## 2025-07-30 RX ORDER — FREMANEZUMAB-VFRM 225 MG/1.5ML
1.5 INJECTION SUBCUTANEOUS
Status: ON HOLD | COMMUNITY

## 2025-07-30 RX ORDER — VENLAFAXINE HYDROCHLORIDE 75 MG/1
150 CAPSULE, EXTENDED RELEASE ORAL DAILY
Status: DISCONTINUED | OUTPATIENT
Start: 2025-07-30 | End: 2025-07-31

## 2025-07-30 RX ORDER — HYDROCODONE BITARTRATE AND ACETAMINOPHEN 5; 325 MG/1; MG/1
1 TABLET ORAL
Status: ON HOLD | COMMUNITY

## 2025-07-30 RX ORDER — VENLAFAXINE HYDROCHLORIDE 75 MG/1
150 CAPSULE, EXTENDED RELEASE ORAL DAILY
Status: CANCELLED | OUTPATIENT
Start: 2025-07-30

## 2025-07-30 RX ORDER — TIRZEPATIDE 2.5 MG/.5ML
2.5 INJECTION, SOLUTION SUBCUTANEOUS WEEKLY
Status: ON HOLD | COMMUNITY

## 2025-07-30 RX ORDER — BUDESONIDE, GLYCOPYRROLATE, AND FORMOTEROL FUMARATE 160; 9; 4.8 UG/1; UG/1; UG/1
2 AEROSOL, METERED RESPIRATORY (INHALATION) 2 TIMES DAILY
Status: ON HOLD | COMMUNITY

## 2025-07-30 RX ORDER — HYDROCHLOROTHIAZIDE 25 MG/1
25 TABLET ORAL DAILY
Status: ON HOLD | COMMUNITY

## 2025-07-30 RX ORDER — FLUOXETINE HYDROCHLORIDE 40 MG/1
40 CAPSULE ORAL DAILY
Status: ON HOLD | COMMUNITY

## 2025-07-30 RX ORDER — PHENTERMINE HYDROCHLORIDE 37.5 MG/1
37.5 TABLET ORAL
Status: ON HOLD | COMMUNITY

## 2025-07-30 RX ORDER — VENLAFAXINE HYDROCHLORIDE 150 MG/1
150 CAPSULE, EXTENDED RELEASE ORAL DAILY
Status: ON HOLD | COMMUNITY

## 2025-07-30 RX ORDER — ALBUTEROL SULFATE 90 UG/1
1 INHALANT RESPIRATORY (INHALATION) EVERY 4 HOURS PRN
Status: ON HOLD | COMMUNITY

## 2025-07-30 RX ORDER — MELOXICAM 7.5 MG/1
7.5 TABLET ORAL DAILY
Status: ON HOLD | COMMUNITY

## 2025-07-30 RX ORDER — VERAPAMIL HYDROCHLORIDE 120 MG/1
120 CAPSULE, EXTENDED RELEASE ORAL NIGHTLY
Status: ON HOLD | COMMUNITY

## 2025-07-30 RX ADMIN — MODAFINIL 200 MG: 100 TABLET ORAL at 09:17

## 2025-07-30 RX ADMIN — OXYCODONE HYDROCHLORIDE 10 MG: 10 TABLET ORAL at 00:40

## 2025-07-30 RX ADMIN — OXYCODONE HYDROCHLORIDE 10 MG: 10 TABLET ORAL at 16:14

## 2025-07-30 RX ADMIN — GABAPENTIN 800 MG: 400 CAPSULE ORAL at 20:44

## 2025-07-30 RX ADMIN — OXYCODONE HYDROCHLORIDE 10 MG: 10 TABLET ORAL at 09:21

## 2025-07-30 RX ADMIN — GABAPENTIN 800 MG: 400 CAPSULE ORAL at 13:13

## 2025-07-30 RX ADMIN — AMITRIPTYLINE HYDROCHLORIDE 25 MG: 25 TABLET ORAL at 20:44

## 2025-07-30 RX ADMIN — MELOXICAM 7.5 MG: 7.5 TABLET ORAL at 09:27

## 2025-07-30 RX ADMIN — GABAPENTIN 800 MG: 400 CAPSULE ORAL at 06:07

## 2025-07-30 RX ADMIN — VERAPAMIL HYDROCHLORIDE 120 MG: 120 TABLET, FILM COATED, EXTENDED RELEASE ORAL at 09:17

## 2025-07-30 RX ADMIN — DOCUSATE SODIUM AND SENNOSIDES 2 TABLET: 8.6; 5 TABLET, FILM COATED ORAL at 09:17

## 2025-07-30 RX ADMIN — HEPARIN SODIUM 5000 UNITS: 5000 INJECTION INTRAVENOUS; SUBCUTANEOUS at 20:45

## 2025-07-30 RX ADMIN — BUSPIRONE HYDROCHLORIDE 10 MG: 10 TABLET ORAL at 09:17

## 2025-07-30 RX ADMIN — HYDROXYZINE HYDROCHLORIDE 25 MG: 25 TABLET, FILM COATED ORAL at 20:41

## 2025-07-30 RX ADMIN — DOCUSATE SODIUM AND SENNOSIDES 2 TABLET: 8.6; 5 TABLET, FILM COATED ORAL at 20:44

## 2025-07-30 RX ADMIN — BUSPIRONE HYDROCHLORIDE 10 MG: 10 TABLET ORAL at 20:44

## 2025-07-30 RX ADMIN — HYDROCHLOROTHIAZIDE 25 MG: 25 TABLET ORAL at 09:18

## 2025-07-30 RX ADMIN — BUDESONIDE AND FORMOTEROL FUMARATE DIHYDRATE 2 PUFF: 160; 4.5 AEROSOL RESPIRATORY (INHALATION) at 20:29

## 2025-07-30 RX ADMIN — HEPARIN SODIUM 5000 UNITS: 5000 INJECTION INTRAVENOUS; SUBCUTANEOUS at 06:07

## 2025-07-30 RX ADMIN — HYDROXYZINE HYDROCHLORIDE 25 MG: 25 TABLET, FILM COATED ORAL at 00:40

## 2025-07-30 RX ADMIN — OXYCODONE HYDROCHLORIDE 10 MG: 10 TABLET ORAL at 21:46

## 2025-07-30 RX ADMIN — Medication 10 MG: at 20:44

## 2025-07-30 RX ADMIN — Medication 1000 MCG: at 09:17

## 2025-07-30 RX ADMIN — VENLAFAXINE HYDROCHLORIDE 150 MG: 75 CAPSULE, EXTENDED RELEASE ORAL at 09:27

## 2025-07-30 RX ADMIN — CARIPRAZINE 6 MG: 3 CAPSULE, GELATIN COATED ORAL at 09:17

## 2025-07-30 RX ADMIN — FLUOXETINE HYDROCHLORIDE 40 MG: 20 CAPSULE ORAL at 09:17

## 2025-07-30 RX ADMIN — BUDESONIDE AND FORMOTEROL FUMARATE DIHYDRATE 2 PUFF: 160; 4.5 AEROSOL RESPIRATORY (INHALATION) at 07:00

## 2025-07-30 RX ADMIN — HEPARIN SODIUM 5000 UNITS: 5000 INJECTION INTRAVENOUS; SUBCUTANEOUS at 13:13

## 2025-07-30 NOTE — PROGRESS NOTES
Problems/Goals  Skin Integrity (Body Function Structure)  Current Status: risk for impaired skin integrity  Long Term Goals  07/29/2025 06:04 PM - Active  no skin breakdown  Potential for Injury (Safety)  Current Status: risk for falls  Short Term Goals  07/29/2025 06:06 PM - Active  no falls  Long Term Goals  07/29/2025 06:05 PM - Active  no falls    Signed by: Jennifer Pulido RN
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
## 2025-07-30 NOTE — PROGRESS NOTES
Problems/Goals  Bed/Chair/Wheelchair (Mobility)  Current Status: min A  Long Term Goals  07/30/2025 03:16 PM - Active  MI  Walk (Mobility)  Current Status: amb 120' RW min A  Long Term Goals  07/30/2025 03:17 PM - Active  amb 300' RW MI    Signed by: Nicolette Pierce, Physical Therapist

## 2025-07-30 NOTE — PROGRESS NOTES
IRF-ADELA Section GG Coding Self Care (Admission)                                            Coding  TG3837.A  Eating                        05. Setup or clean-up assistance - Hel                                          per SETS UP or CLEANS UP; patient comp                                          letes activity. [place] assists only pr                                          ior to or following the activity.  YE8510.B  Oral Hygiene                  05. Setup or clean-up assistance - Hel                                          per SETS UP or CLEANS UP; patient comp                                          letes activity. [place] assists only pr                                          ior to or following the activity.  TC5357.C  Toileting Hygiene             03. Partial/moderate assistance - Help                                          er does LESS THAN HALF the effort. Hel                                          per lifts, holds or supports trunk or                                          limbs, but provides less than half the                                          effort.  LB3705.E  Shower/Bathe Self             03. Partial/moderate assistance - Help                                          er does LESS THAN HALF the effort. Hel                                          per lifts, holds or supports trunk or                                          limbs, but provides less than half the                                          effort.  UT2360.F  Upper Body Dressing           04. Supervision or touching assistance                                          - [place] provides VERBAL CUES or TOUCH                                          ING/STEADYING assistance as patient co                                          mpletes activity. Assistance may be pr                                          ovided throughout the activity or inte                                          rmittently.  [id number].G  Lower Body Dressing            03. Partial/moderate assistance - Help                                          er does LESS THAN HALF the effort. Hel                                          per lifts, holds or supports trunk or                                          limbs, but provides less than half the                                          effort.  [id number].H  Putting On/Taking Off Footwear  03. Partial/moderate assistance - Help                                            er does LESS THAN HALF the effort. Hel                                          per lifts, holds or supports trunk or                                          limbs, but provides less than half the                                          effort.    Signed by: Maryann Felix, OT
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
## 2025-07-30 NOTE — PROGRESS NOTES
Problems/Goals  Dressing (Lower) (Self Care)  Current Status: min/mod a  Long Term Goals  07/30/2025 03:19 PM - Active  SBA    Signed by: Maryann Felix OT

## 2025-07-30 NOTE — PLAN OF CARE
Problem: Rehabilitation (IRF) Plan of Care  Goal: Plan of Care Review  Outcome: Progressing  Flowsheets (Taken 7/29/2025 2225)  Progress: no change  Plan of Care Reviewed With: patient  Goal: Patient-Specific Goal (Individualized)  Outcome: Progressing  Goal: Absence of New-Onset Illness or Injury  Outcome: Progressing  Intervention: Identify and Manage Fall Risk  Description: Perform standard risk assessment on admission using a validated tool or comprehensive approach appropriate to the patient; reassess fall risk frequently, with change in status or transfer to another level of care.Communicate risk to interprofessional healthcare team; ensure fall risk visible cue.Determine need for increased observation, equipment and environmental modification, as well as use of supportive, nonskid footwear.Adjust safety measures to individual needs and identified risk factors.Reinforce the importance of active participation with fall risk prevention, safety and physical activity with the patient and family.Perform regular intentional rounding to assess need for position change, pain management, attention to personal needs and assistance with toileting.  Recent Flowsheet Documentation  Taken 7/29/2025 2200 by Gisela Garza, RN  Safety Promotion/Fall Prevention:   nonskid shoes/slippers when out of bed   safety round/check completed  Taken 7/29/2025 2000 by Gisela Garza, RN  Safety Promotion/Fall Prevention:   nonskid shoes/slippers when out of bed   safety round/check completed  Goal: Optimal Comfort and Wellbeing  Outcome: Progressing  Goal: Home and Community Transition Plan Established  Outcome: Progressing   Goal Outcome Evaluation:  Plan of Care Reviewed With: patient        Progress: no change

## 2025-07-30 NOTE — PLAN OF CARE
Goal Outcome Evaluation:           Progress: improving       Problem: Rehabilitation (IRF) Plan of Care  Goal: Plan of Care Review  Outcome: Progressing  Flowsheets  Taken 7/30/2025 1123 by Yudi Pulido RN  Progress: improving  Taken 7/29/2025 2225 by Gisela Garza RN  Plan of Care Reviewed With: patient  Goal: Patient-Specific Goal (Individualized)  Outcome: Progressing  Goal: Absence of New-Onset Illness or Injury  Outcome: Progressing  Intervention: Identify and Manage Fall Risk  Recent Flowsheet Documentation  Taken 7/30/2025 1000 by Yudi Pulido RN  Safety Promotion/Fall Prevention:   nonskid shoes/slippers when out of bed   safety round/check completed  Taken 7/30/2025 0800 by Yudi Pulido RN  Safety Promotion/Fall Prevention:   nonskid shoes/slippers when out of bed   safety round/check completed  Intervention: Prevent Skin Injury  Recent Flowsheet Documentation  Taken 7/30/2025 0917 by Yudi Pulido RN  Device Skin Pressure Protection: (dressing under wrist brace to be changed if soiled or loose only. dressing on right hip coverederm to be changed if soiled or loose only.) --  Intervention: Prevent VTE (Venous Thromboembolism)  Recent Flowsheet Documentation  Taken 7/30/2025 0917 by Yudi Pulido RN  VTE Prevention/Management: (see MAR) --  Goal: Optimal Comfort and Wellbeing  Outcome: Progressing  Intervention: Provide Person-Centered Care  Recent Flowsheet Documentation  Taken 7/30/2025 0917 by Yudi Pulido RN  Trust Relationship/Rapport:   care explained   questions answered  Intervention: Monitor Pain and Promote Comfort  Recent Flowsheet Documentation  Taken 7/30/2025 0917 by Yudi Pulido RN  Pain Management Interventions:   pillow support provided   position adjusted  Goal: Home and Community Transition Plan Established  Outcome: Progressing

## 2025-07-30 NOTE — PROGRESS NOTES
[facility] Section GG Coding Mobility (Admission)                                            Coding  ZG0025.A  Roll Left and Right           03. Partial/moderate assistance - Help                                          er does LESS THAN HALF the effort. Hel                                          per lifts, holds or supports trunk or                                          limbs, but provides less than half the                                          effort.  [id number].B  Sit to Lying                  03. Partial/moderate assistance - Help                                          er does LESS THAN HALF the effort. Hel                                          per lifts, holds or supports trunk or                                          limbs, but provides less than half the                                          effort.  IX9342.C  Lying to Sitting on Side of Bed  03. Partial/moderate assistance -  Help                                          er does LESS THAN HALF the effort. Hel                                          per lifts, holds or supports trunk or                                          limbs, but provides less than half the                                          effort.  MM5285.D  Sit to Stand                  03. Partial/moderate assistance - Help                                          er does LESS THAN HALF the effort. Hel                                          per lifts, holds or supports trunk or                                          limbs, but provides less than half the                                          effort.  IJ6305.E  Chair/Bed-to-Chair Transfer   03. Partial/moderate assistance - Help                                          er does LESS THAN HALF the effort. Hel                                          per lifts, holds or supports trunk or                                          limbs, but provides less than half the                                           effort.  TN6875.F  Toilet Transfer               04. Supervision or touching assistance                                          - [place] provides VERBAL CUES or TOUCH                                          ING/STEADYING assistance as patient co                                          mpletes activity. Assistance may be pr                                          ovided throughout the activity or inte                                          rmittently.  SX8930.G  Car Transfer                  88. Not attempted due to medical condi                                          tion or safety concerns  FY3034.I  Walk 10 Feet                  03. Partial/moderate assistance - Help                                          er does LESS THAN HALF the effort. Hel                                          per lifts, holds or supports trunk or                                          limbs, but provides less than half the                                          effort.  HM5764.J  Walk 50 Feet with Two Turns   03. Partial/moderate assistance - Help                                          er does LESS THAN HALF the effort. Hel                                          per lifts, holds or supports trunk or                                          limbs, but provides less than half the                                          effort.  HZ5901.K  Walk 150 Feet                 88. Not attempted due to medical condi                                          tion or safety concerns  JJ7982.L  Walking 10 Feet on Uneven Surface  88. Not attempted due to medical  condi                                          tion or safety concerns  RS9732.M  1 Step (curb)                 88. Not attempted due to medical condi                                          tion or safety concerns  [id number].N  4 Steps                       88. Not attempted due to medical condi                                          tion or safety concerns  GL6553.O  12 Steps                       88. Not attempted due to medical condi                                          tion or safety concerns  RH8150.P  Picking Up Object             88. Not attempted due to medical condi                                          tion or safety concerns  [id number].Q1  Does the patient use a wheelchair and/or scooter?  0. No    FY7920.R  Wheel 50 Feet with Two Turns  88. Not attempted due to medical condi                                          tion or safety concerns  CV9236.RR1  Type of Wheelchair/Scooter for 50 Feet with Two Turns  1. Manual    CW9725.S  Wheel 150 Feet                88. Not attempted due to medical condi                                          tion or safety concerns  [id number].SS1  Type of Wheelchair/Scooter for 150 Feet  1. Manual    Signed by: Nicolette Pierce, Physical Therapist

## 2025-07-30 NOTE — H&P
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 involving the left C2 vertebral body extending into the transverse foramina. No acute fracture of the thoracic and lumbar spine. No acute pelvic trauma. Proximal right femoral shaft fracture. CRITICAL RESULT:   No. COMMUNICATION: Per this written report. Drafted by Joselo Amezquita MD on 7/15/2025 12:34 PM Final report signed by Joselo Amezquita MD on 7/15/2025 12:45 PM    CT Angiogram Chest  Result Date: 7/15/2025  No evidence of traumatic aortic injury. No acute traumatic injury in the chest. No acute traumatic injury in the abdomen and pelvis. Acute fracture involving the left C2 vertebral body extending into the transverse foramina. No acute fracture of the thoracic and lumbar spine. No acute pelvic trauma. Proximal right femoral shaft fracture. CRITICAL RESULT:   No. COMMUNICATION: Per this written report. Drafted by Joselo Amezquita MD on 7/15/2025 12:34 PM Final report signed by Joselo Amezquita MD on 7/15/2025 12:45 PM    CT Angiogram Neck  Result Date: 7/15/2025  Neck CTA: No hemodynamically significant stenosis is present within the cervical carotid and vertebral systems. Head CTA: No hemodynamically significant intracranial arterial stenosis or aneurysm is  present. CRITICAL RESULT: No. COMMUNICATION: Per this written report. Drafted by Shell Mobley MD on 7/15/2025 12:22 PM Final report signed by Shell Mobley MD on 7/15/2025 12:37 PM    CT Angiogram Head  Result Date: 7/15/2025  Neck CTA: No hemodynamically significant stenosis is present within the cervical carotid and vertebral systems. Head CTA: No hemodynamically significant intracranial arterial stenosis or aneurysm is present. CRITICAL RESULT: No. COMMUNICATION: Per this written report. Drafted by Shell Mobley MD on 7/15/2025 12:22 PM Final report signed by Shell Mobley MD on 7/15/2025 12:37 PM    CT Head Without Contrast  Result Date: 7/15/2025  1. Large right-sided scalp hematoma with laceration. 2. No CT evidence for acute intracranial hemorrhage.. CRITICAL RESULT:   No. COMMUNICATION: Per this written report. Drafted by Shell Mobley MD on 7/15/2025 12:11 PM Final report signed by Shell Mobley MD on 7/15/2025 12:18 PM    XR Pelvis 1 or 2 View  Result Date: 7/15/2025  No evidence of acute injury within the imaged chest or pelvis. Fracture of the right proximal femur, partially imaged. CRITICAL RESULT:   No. COMMUNICATION: Per this written report. Drafted by Joselo Amezquita MD on 7/15/2025 11:54 AM Final report signed by Joselo Amezquita MD on 7/15/2025 11:55 AM    XR Chest 1 View  Result Date: 7/15/2025  No evidence of acute injury within the imaged chest or pelvis. Fracture of the right proximal femur, partially imaged. CRITICAL RESULT:   No. COMMUNICATION: Per this written report. Drafted by Joselo Amezquita MD on 7/15/2025 11:54 AM Final report signed by Joselo Amezquita MD on 7/15/2025 11:55 AM        Assessment & Plan    Multiple trauma injuries as delineated above.  C2 vertebral body fracture, right wrist fracture, right femur fracture, scalp laceration status post repair.  Patient is weight-bear as tolerated on the lower extremities, the right upper extremity she cannot bear weight below the elbow but weight-bear as  tolerated from the elbow up.  Will continue oxycodone as needed for pain.  Continue as needed Tylenol and Robaxin.  Patient will undergo PT and OT assessments today.  Patient will undergo PT 1 to 2 hours a day 5 to 6 days a week and OT 1 to 2 hours a day 5 to 6 days a week for total of 15 hours of therapies a week.  Anticipated disposition is home, patient lives with her son in law and daughter.  Anticipated improvement for safe discharge patient be completely independent with cognition and communication, modified independent for mobility and ambulation, completely independent for upper body dressing, modified independent for lower body dressing grooming feeding and bathing and completely independent for toileting.  Anticipated length of stay is 14 days    Scalp laceration, Vaseline twice daily    Anxiety and depression, patient is on multiple antidepressants including amitriptyline, Prozac, Effexor, Vraylar, and BuSpar.  She is also on modafinil for ADHD.  She gets these at the discretion of a nurse practitioner Melba Aiken.  Of note epic gave multiple warnings about the combination of these medicines.  Patient states she feels like she is doing well from an anxiety and depression standpoint but because of possible multiple interactions, I have asked psychiatry to see the patient to verify medications for her psychiatric illness are optimized.    Asthma, stable, patient quit smoking actually 3 years ago,  Continue as needed albuterol, we are giving the breast tree equivalent and inhalers  in inhalers.    Hypertension, on K-Lyte and HCTZ, currently controlled, follow and make adjustments as needed.    Morbid obesity as evidenced by BMI greater than 40, adversely affecting her overall health and acute rehabilitation.    VTE prophylaxis, patient is to be on subcu heparin at least through August 12, this has been ordered, patient has a Lovenox allergy.    Chronic pain syndrome, patient is on gabapentin and Norco at  home, [place] has been held as she is on oxycodone, continuing gabapentin.    VTE Prophylaxis:   [unfilled]    Falls Risk Assessment with her right leg fracture and nonweightbearing right upper extremity, high risk of falling        Emerson Charlton MD  [facility]  07/30/25  09:46 EDT

## 2025-07-31 PROCEDURE — 94799 UNLISTED PULMONARY SVC/PX: CPT

## 2025-07-31 PROCEDURE — 97530 THERAPEUTIC ACTIVITIES: CPT

## 2025-07-31 PROCEDURE — 97110 THERAPEUTIC EXERCISES: CPT

## 2025-07-31 PROCEDURE — 94760 N-INVAS EAR/PLS OXIMETRY 1: CPT

## 2025-07-31 PROCEDURE — 97116 GAIT TRAINING THERAPY: CPT

## 2025-07-31 PROCEDURE — 25010000002 HEPARIN (PORCINE) PER 1000 UNITS: Performed by: INTERNAL MEDICINE

## 2025-07-31 PROCEDURE — 97535 SELF CARE MNGMENT TRAINING: CPT

## 2025-07-31 PROCEDURE — 94664 DEMO&/EVAL PT USE INHALER: CPT

## 2025-07-31 RX ORDER — DULOXETIN HYDROCHLORIDE 60 MG/1
60 CAPSULE, DELAYED RELEASE ORAL EVERY 12 HOURS SCHEDULED
Status: DISPENSED | OUTPATIENT
Start: 2025-07-31

## 2025-07-31 RX ORDER — DULOXETIN HYDROCHLORIDE 20 MG/1
40 CAPSULE, DELAYED RELEASE ORAL EVERY 12 HOURS SCHEDULED
Status: DISCONTINUED | OUTPATIENT
Start: 2025-07-31 | End: 2025-07-31

## 2025-07-31 RX ADMIN — FLUOXETINE HYDROCHLORIDE 40 MG: 20 CAPSULE ORAL at 08:37

## 2025-07-31 RX ADMIN — OXYCODONE HYDROCHLORIDE 10 MG: 10 TABLET ORAL at 15:18

## 2025-07-31 RX ADMIN — Medication 10 MG: at 20:21

## 2025-07-31 RX ADMIN — VENLAFAXINE HYDROCHLORIDE 150 MG: 75 CAPSULE, EXTENDED RELEASE ORAL at 08:41

## 2025-07-31 RX ADMIN — HYDROCHLOROTHIAZIDE 25 MG: 25 TABLET ORAL at 08:38

## 2025-07-31 RX ADMIN — HYDROXYZINE HYDROCHLORIDE 25 MG: 25 TABLET, FILM COATED ORAL at 20:19

## 2025-07-31 RX ADMIN — ACETAMINOPHEN 1000 MG: 500 TABLET, FILM COATED ORAL at 13:00

## 2025-07-31 RX ADMIN — MELOXICAM 7.5 MG: 7.5 TABLET ORAL at 08:39

## 2025-07-31 RX ADMIN — HEPARIN SODIUM 5000 UNITS: 5000 INJECTION INTRAVENOUS; SUBCUTANEOUS at 15:19

## 2025-07-31 RX ADMIN — DOCUSATE SODIUM AND SENNOSIDES 2 TABLET: 8.6; 5 TABLET, FILM COATED ORAL at 08:38

## 2025-07-31 RX ADMIN — VERAPAMIL HYDROCHLORIDE 120 MG: 120 TABLET, FILM COATED, EXTENDED RELEASE ORAL at 08:42

## 2025-07-31 RX ADMIN — GABAPENTIN 800 MG: 400 CAPSULE ORAL at 21:00

## 2025-07-31 RX ADMIN — HEPARIN SODIUM 5000 UNITS: 5000 INJECTION INTRAVENOUS; SUBCUTANEOUS at 05:53

## 2025-07-31 RX ADMIN — BUDESONIDE AND FORMOTEROL FUMARATE DIHYDRATE 2 PUFF: 160; 4.5 AEROSOL RESPIRATORY (INHALATION) at 19:53

## 2025-07-31 RX ADMIN — OXYCODONE HYDROCHLORIDE 10 MG: 10 TABLET ORAL at 20:50

## 2025-07-31 RX ADMIN — BUSPIRONE HYDROCHLORIDE 10 MG: 10 TABLET ORAL at 08:39

## 2025-07-31 RX ADMIN — MODAFINIL 200 MG: 100 TABLET ORAL at 08:37

## 2025-07-31 RX ADMIN — DULOXETINE 60 MG: 60 CAPSULE, DELAYED RELEASE ORAL at 20:21

## 2025-07-31 RX ADMIN — GABAPENTIN 800 MG: 400 CAPSULE ORAL at 05:53

## 2025-07-31 RX ADMIN — OXYCODONE HYDROCHLORIDE 10 MG: 10 TABLET ORAL at 08:36

## 2025-07-31 RX ADMIN — HEPARIN SODIUM 5000 UNITS: 5000 INJECTION INTRAVENOUS; SUBCUTANEOUS at 21:00

## 2025-07-31 RX ADMIN — GABAPENTIN 800 MG: 400 CAPSULE ORAL at 13:01

## 2025-07-31 RX ADMIN — Medication 1000 MCG: at 08:39

## 2025-07-31 RX ADMIN — BUSPIRONE HYDROCHLORIDE 10 MG: 10 TABLET ORAL at 20:21

## 2025-07-31 RX ADMIN — CARIPRAZINE 6 MG: 3 CAPSULE, GELATIN COATED ORAL at 08:40

## 2025-07-31 NOTE — PROGRESS NOTES
"Section A. Administrative Information - Admission    Ethnicity: A. Not of , /a, or [place] origin    Race:  A. White, B. Black or     A. Preferred Language:  English    B.  Does patient need or want an  to communicate with a doctor or  health care staff?  0. No        Has lack of transportation kept patient from medical appointments, meetings,  work, or from getting things needed for daily living?  C. No    Current Payment Source(s) for IRF Services: B. Medicare (managed care/Part  C/Medicare Advantage), C. Medicaid (traditional fee-for-service)    Section B.  Hearing, Speech, and Vision - Admission  . Hearin. Adequate - no difficulty in normal conversation, social interaction,  listening to TV    . Vision:  0. Adequate - sees fine detail, such as regular print in newspapers/books    . Health Literacy - Frequency of requiring assistance reading instructions,  pamphlets, other written material from doctor or pharmacy:  0. Never    [id number]. Expression of Ideas and Wants:  4. Expresses complex messages without difficulty and with speech that is clear  and easy to understand    [id number]. Understanding Verbal and Non-Verbal Content:  4. Understands: Clear comprehension without cues or repetitions    Section C.  Cognitive Patterns - Admission  . Should Brief Interview for Mental Status (-) be conducted?  (1) Yes        Number of words repeated by patient after first attempt:  3. Three        A.  ?Please tell me what year it is right now.?    A. Able to report correct year:  3. Correct    B.  ?What month are we in right now??    B. Able to report correct month:  2. Accurate within 5 days    C.  ?What day of the week is today??    C. Able to report correct day of the week:  1. Correct            A.  Able to recall ?sock?:  1. Yes, after cueing ('something to wear\")    B.  Able to recall ?blue?  2. Yes, no cue required    C.  Able to recall ?bed?:  1. " "Yes, after cueing (\"a piece of furniture\")        BIMS Score:  13    Code: 0    Description: Patient was able to complete BIMS.    A. Is there evidence of an acute change in mental status from the patient's  baseline?  0. No    B. Inattention:  0. Behavior not present    C. Disorganized thinkin. Behavior not present    D. Altered level of consciousness:  0. Behavior not present    Section D. Mood - Admission  \"Over the last 2 weeks, have you been bothered by any of the following  problems?\"    . Patient Mood Interview (PHQ-2 to 9) (from [employer]?)                            1. Symptom Presence       2. Symptom Frequency  A. Little interest or pleasure in doing things  0. No                     0.  Never or 1 day  B. Feeling down, depressed, or hopeless  0. No                     0. Never or 1  day            PHQ interview ended, as above answers do not meet criteria to continue    . Total Severity Score: 0    Interpretation: Minimal depression    How often do you feel lonely or isolated from those around you?  1. Rarely    Section GG.  Prior Functioning/Device Use  Patient's usual ability with everyday activities prior to the current illness,  exacerbation, or injury as follows:        A. Self-Care: (3) Independent - Patient completed the activities by him/herself,  with or without an assistive device, with no assistance from a helper.        B: Indoor Mobility (Ambulation): (3) Independent - Patient completed the  activities by him/herself, with or without an assistive device, with no  assistance from a helper.        C. Stairs: (3) Independent - Patient completed the activities by him/herself,  with or without an assistive device, with no assistance from a helper.        D. Functional Cognition: (3) Independent - Patient completed the activities by  him/herself, with or without an assistive device, with no assistance from a  helper.      Z. Patient did not use a manual wheelchair, motorized " wheelchair/scooter,  mechanical lift, walker or orthotics/prosthetics prior to current illness,  exacerbation, or injury.    [facility] Section GG Coding Self Care (Admission)                                            Coding  QU8053.A  Eating                        05. Setup or clean-up assistance - Hel                                          per SETS UP or CLEANS UP; patient comp                                          letes activity. [place] assists only pr                                          ior to or following the activity.  TF1810.B  Oral Hygiene                  05. Setup or clean-up assistance - Hel                                          per SETS UP or CLEANS UP; patient comp                                          letes activity. [place] assists only pr                                          ior to or following the activity.  HW1288.C  Toileting Hygiene             03. Partial/moderate assistance - Help                                          er does LESS THAN HALF the effort. Hel                                          per lifts, holds or supports trunk or                                          limbs, but provides less than half the                                          effort.  VB0044.E  Shower/Bathe Self             03. Partial/moderate assistance - Help                                          er does LESS THAN HALF the effort. Hel                                          per lifts, holds or supports trunk or                                          limbs, but provides less than half the                                          effort.  IN1137.F  Upper Body Dressing           04. Supervision or touching assistance                                          - [place] provides VERBAL CUES or TOUCH                                          ING/STEADYING assistance as patient co                                          mpletes activity. Assistance may be pr                                           ovided throughout the activity or inte                                          rmittently.  YS3678.G  Lower Body Dressing           03. Partial/moderate assistance - Help                                          er does LESS THAN HALF the effort. Hel                                          per lifts, holds or supports trunk or                                          limbs, but provides less than half the                                          effort.  QS0778.H  Putting On/Taking Off Footwear  03. Partial/moderate assistance - Help                                            er does LESS THAN HALF the effort. Hel                                          per lifts, holds or supports trunk or                                          limbs, but provides less than half the                                          effort.      [facility] Section GG Coding Mobility (Admission)                                            Coding  MJ0717.A  Roll Left and Right           03. Partial/moderate assistance - Help                                          er does LESS THAN HALF the effort. Hel                                          per lifts, holds or supports trunk or                                          limbs, but provides less than half the                                          effort.  OV2905.B  Sit to Lying                  03. Partial/moderate assistance - Help                                          er does LESS THAN HALF the effort. Hel                                          per lifts, holds or supports trunk or                                          limbs, but provides less than half the                                          effort.  EQ4180.C  Lying to Sitting on Side of Bed  03. Partial/moderate assistance -  Help                                          er does LESS THAN HALF the effort. Hel                                          per lifts, holds or supports trunk or                                           limbs, but provides less than half the                                          effort.  MA9294.D  Sit to Stand                  03. Partial/moderate assistance - Help                                          er does LESS THAN HALF the effort. Hel                                          per lifts, holds or supports trunk or                                          limbs, but provides less than half the                                          effort.  VS3781.E  Chair/Bed-to-Chair Transfer   03. Partial/moderate assistance - Help                                          er does LESS THAN HALF the effort. Hel                                          per lifts, holds or supports trunk or                                          limbs, but provides less than half the                                          effort.  VT5503.F  Toilet Transfer               04. Supervision or touching assistance                                          - [place] provides VERBAL CUES or TOUCH                                          ING/STEADYING assistance as patient co                                          mpletes activity. Assistance may be pr                                          ovided throughout the activity or inte                                          rmittently.  CQ2692.G  Car Transfer                  88. Not attempted due to medical condi                                          tion or safety concerns  UU8016.I  Walk 10 Feet                  03. Partial/moderate assistance - Help                                          er does LESS THAN HALF the effort. Hel                                          per lifts, holds or supports trunk or                                          limbs, but provides less than half the                                          effort.  QD3780.J  Walk 50 Feet with Two Turns   03. Partial/moderate assistance - Help                                          er does LESS THAN HALF the effort.  Hel                                          per lifts, holds or supports trunk or                                          limbs, but provides less than half the                                          effort.  XU1709.K  Walk 150 Feet                 88. Not attempted due to medical condi                                          tion or safety concerns  BS5256.L  Walking 10 Feet on Uneven Surface  88. Not attempted due to medical  condi                                          tion or safety concerns  UC6979.M  1 Step (curb)                 88. Not attempted due to medical condi                                          tion or safety concerns  LA7033.N  4 Steps                       88. Not attempted due to medical condi                                          tion or safety concerns  TF1596.O  12 Steps                      88. Not attempted due to medical condi                                          tion or safety concerns  AH7209.P  Picking Up Object             88. Not attempted due to medical condi                                          tion or safety concerns  YR8727.Q1  Does the patient use a wheelchair and/or scooter?  0. No    AQ0310.R  Wheel 50 Feet with Two Turns  88. Not attempted due to medical condi                                          tion or safety concerns  MJ3758.RR1  Type of Wheelchair/Scooter for 50 Feet with Two Turns  1. Manual    LB9203.S  Wheel 150 Feet                88. Not attempted due to medical condi                                          tion or safety concerns  LX8946.SS1  Type of Wheelchair/Scooter for 150 Feet  1. Manual        Section H.  Bladder and Bowel      . Bladder Continence:  4. Always incontinent    . Bowel Continence:  9. Not rated, patient had an ostomy or did not have a bowel movement for the  entire 3 days    Section J.  Health Conditions - Admission  . Pain Effect on Sleep - ?Over the past 5 days, how much of the time has  pain made it hard  "for you to sleep at night??:  4. Almost constantly    . Pain Interference with Therapy Activities - ?Over the past 5 days, how  often have you limited your participation in rehabilitation therapy sessions due  to pain?\":  1. Rarely or not at all    . Pain Interference with Day-to-Day Activities - ?Over the past 5 days, how  often have you limited your day-to-day activities (excluding rehabilitation  therapy sessions) because of pain??:  4. Almost constantly    . History of Falls - Has the patient had two or more falls in the past year  or any fall with injury in the past year?  0. No    . Prior Surgery - Did the patient have major surgery during the 100 days  prior to admission?  1. Yes    Section K.  Swallowing/Nutritional Status - Admission  . Nutritional Approaches On Admission:  Z. None    Section M. Skin Conditions - Admission  Does this patient have one or more unhealed pressure ulcers/injuries?  0. No    Section N. Medication - Admission                                  Is taking                 Indication noted  A. Antipsychotic          Yes                       Yes  E. Anticoagulant          Yes                       Yes  H. Opioid                 Yes                       Yes      Did a complete drug regimen review identify potential clinically significant  medication issues?  0. No - No issues found during review    Section O. Special Treatments, Procedures, and Programs - Admission      C1. Oxygen Therapy, C2. Continuous    Signed by: Nurse Jaime    "

## 2025-07-31 NOTE — PLAN OF CARE
Problem: Rehabilitation (IRF) Plan of Care  Goal: Absence of New-Onset Illness or Injury  Intervention: Prevent Skin Injury  Recent Flowsheet Documentation  Taken 7/31/2025 0735 by Madi Irby, RN  Device Skin Pressure Protection: pressure points protected  Skin Protection: incontinence pads utilized     Problem: Rehabilitation (IRF) Plan of Care  Goal: Absence of New-Onset Illness or Injury  Intervention: Prevent VTE (Venous Thromboembolism)  Recent Flowsheet Documentation  Taken 7/31/2025 0735 by Madi Irby, RN  VTE Prevention/Management: (heparin) other (see comments)     Problem: Fall Injury Risk  Goal: Absence of Fall and Fall-Related Injury  Outcome: Progressing  Intervention: Identify and Manage Contributors  Recent Flowsheet Documentation  Taken 7/31/2025 0735 by Madi Irby, RN  Medication Review/Management: medications reviewed  Intervention: Promote Injury-Free Environment  Recent Flowsheet Documentation  Taken 7/31/2025 1600 by Madi Irby, RN  Safety Promotion/Fall Prevention:   safety round/check completed   fall prevention program maintained  Taken 7/31/2025 1400 by Madi Ibry, RN  Safety Promotion/Fall Prevention:   safety round/check completed   fall prevention program maintained  Taken 7/31/2025 1200 by Madi Irby, RN  Safety Promotion/Fall Prevention:   safety round/check completed   fall prevention program maintained  Taken 7/31/2025 1000 by Madi Irby, RN  Safety Promotion/Fall Prevention:   safety round/check completed   fall prevention program maintained  Taken 7/31/2025 0735 by Madi Irby, RN  Safety Promotion/Fall Prevention:   safety round/check completed   room organization consistent   nonskid shoes/slippers when out of bed   gait belt   fall prevention program maintained   clutter free environment maintained   assistive device/personal items within reach   Goal Outcome Evaluation:  Plan of Care Reviewed With: patient        Progress:  improving

## 2025-07-31 NOTE — PROGRESS NOTES
"Section A. Administrative Information - Admission    Ethnicity: A. Not of , /a, or [place] origin    Race:  A. White, B. Black or     A. Preferred Language:  English    B.  Does patient need or want an  to communicate with a doctor or  health care staff?  0. No        Has lack of transportation kept patient from medical appointments, meetings,  work, or from getting things needed for daily living?  C. No    Current Payment Source(s) for IRF Services: B. Medicare (managed care/Part  C/Medicare Advantage), C. Medicaid (traditional fee-for-service)    Section B.  Hearing, Speech, and Vision - Admission  . Hearin. Adequate - no difficulty in normal conversation, social interaction,  listening to TV    . Vision:  0. Adequate - sees fine detail, such as regular print in newspapers/books    . Health Literacy - Frequency of requiring assistance reading instructions,  pamphlets, other written material from doctor or pharmacy:  0. Never    [id number]. Expression of Ideas and Wants:  4. Expresses complex messages without difficulty and with speech that is clear  and easy to understand    [id number]. Understanding Verbal and Non-Verbal Content:  4. Understands: Clear comprehension without cues or repetitions    Section C.  Cognitive Patterns - Admission  . Should Brief Interview for Mental Status (-) be conducted?  (1) Yes        Number of words repeated by patient after first attempt:  3. Three        A.  ?Please tell me what year it is right now.?    A. Able to report correct year:  3. Correct    B.  ?What month are we in right now??    B. Able to report correct month:  2. Accurate within 5 days    C.  ?What day of the week is today??    C. Able to report correct day of the week:  1. Correct            A.  Able to recall ?sock?:  1. Yes, after cueing ('something to wear\")    B.  Able to recall ?blue?  2. Yes, no cue required    C.  Able to recall ?bed?:  1. " "Yes, after cueing (\"a piece of furniture\")        BIMS Score:  13    Code: 0    Description: Patient was able to complete BIMS.    A. Is there evidence of an acute change in mental status from the patient's  baseline?  0. No    B. Inattention:  0. Behavior not present    C. Disorganized thinkin. Behavior not present    D. Altered level of consciousness:  0. Behavior not present    Section D. Mood - Admission  \"Over the last 2 weeks, have you been bothered by any of the following  problems?\"    . Patient Mood Interview (PHQ-2 to 9) (from [employer]?)                            1. Symptom Presence       2. Symptom Frequency  A. Little interest or pleasure in doing things  0. No                     0.  Never or 1 day  B. Feeling down, depressed, or hopeless  0. No                     0. Never or 1  day            PHQ interview ended, as above answers do not meet criteria to continue    . Total Severity Score: 0    Interpretation: Minimal depression    How often do you feel lonely or isolated from those around you?  1. Rarely    Section GG.  Prior Functioning/Device Use  Patient's usual ability with everyday activities prior to the current illness,  exacerbation, or injury as follows:        A. Self-Care: (3) Independent - Patient completed the activities by him/herself,  with or without an assistive device, with no assistance from a helper.        B: Indoor Mobility (Ambulation): (3) Independent - Patient completed the  activities by him/herself, with or without an assistive device, with no  assistance from a helper.        C. Stairs: (3) Independent - Patient completed the activities by him/herself,  with or without an assistive device, with no assistance from a helper.        D. Functional Cognition: (3) Independent - Patient completed the activities by  him/herself, with or without an assistive device, with no assistance from a  helper.      Z. Patient did not use a manual wheelchair, motorized " wheelchair/scooter,  mechanical lift, walker or orthotics/prosthetics prior to current illness,  exacerbation, or injury.    [facility] Section GG Coding Self Care (Admission)                                            Coding  XU6471.A  Eating                        05. Setup or clean-up assistance - Hel                                          per SETS UP or CLEANS UP; patient comp                                          letes activity. [place] assists only pr                                          ior to or following the activity.  VU1109.B  Oral Hygiene                  05. Setup or clean-up assistance - Hel                                          per SETS UP or CLEANS UP; patient comp                                          letes activity. [place] assists only pr                                          ior to or following the activity.  FT3154.C  Toileting Hygiene             03. Partial/moderate assistance - Help                                          er does LESS THAN HALF the effort. Hel                                          per lifts, holds or supports trunk or                                          limbs, but provides less than half the                                          effort.  KI0647.E  Shower/Bathe Self             03. Partial/moderate assistance - Help                                          er does LESS THAN HALF the effort. Hel                                          per lifts, holds or supports trunk or                                          limbs, but provides less than half the                                          effort.  AP3326.F  Upper Body Dressing           04. Supervision or touching assistance                                          - [place] provides VERBAL CUES or TOUCH                                          ING/STEADYING assistance as patient co                                          mpletes activity. Assistance may be pr                                           ovided throughout the activity or inte                                          rmittently.  ZY1092.G  Lower Body Dressing           03. Partial/moderate assistance - Help                                          er does LESS THAN HALF the effort. Hel                                          per lifts, holds or supports trunk or                                          limbs, but provides less than half the                                          effort.  HP4534.H  Putting On/Taking Off Footwear  03. Partial/moderate assistance - Help                                            er does LESS THAN HALF the effort. Hel                                          per lifts, holds or supports trunk or                                          limbs, but provides less than half the                                          effort.      [facility] Section GG Coding Mobility (Admission)                                            Coding  GC0693.A  Roll Left and Right           03. Partial/moderate assistance - Help                                          er does LESS THAN HALF the effort. Hel                                          per lifts, holds or supports trunk or                                          limbs, but provides less than half the                                          effort.  GN5757.B  Sit to Lying                  03. Partial/moderate assistance - Help                                          er does LESS THAN HALF the effort. Hel                                          per lifts, holds or supports trunk or                                          limbs, but provides less than half the                                          effort.  ZP2177.C  Lying to Sitting on Side of Bed  03. Partial/moderate assistance -  Help                                          er does LESS THAN HALF the effort. Hel                                          per lifts, holds or supports trunk or                                           limbs, but provides less than half the                                          effort.  BG4202.D  Sit to Stand                  03. Partial/moderate assistance - Help                                          er does LESS THAN HALF the effort. Hel                                          per lifts, holds or supports trunk or                                          limbs, but provides less than half the                                          effort.  LU1643.E  Chair/Bed-to-Chair Transfer   03. Partial/moderate assistance - Help                                          er does LESS THAN HALF the effort. Hel                                          per lifts, holds or supports trunk or                                          limbs, but provides less than half the                                          effort.  FI8389.F  Toilet Transfer               04. Supervision or touching assistance                                          - [place] provides VERBAL CUES or TOUCH                                          ING/STEADYING assistance as patient co                                          mpletes activity. Assistance may be pr                                          ovided throughout the activity or inte                                          rmittently.  PZ1176.G  Car Transfer                  88. Not attempted due to medical condi                                          tion or safety concerns  LS5418.I  Walk 10 Feet                  03. Partial/moderate assistance - Help                                          er does LESS THAN HALF the effort. Hel                                          per lifts, holds or supports trunk or                                          limbs, but provides less than half the                                          effort.  YA1481.J  Walk 50 Feet with Two Turns   03. Partial/moderate assistance - Help                                          er does LESS THAN HALF the effort.  Hel                                          per lifts, holds or supports trunk or                                          limbs, but provides less than half the                                          effort.  SE3577.K  Walk 150 Feet                 88. Not attempted due to medical condi                                          tion or safety concerns  FH0206.L  Walking 10 Feet on Uneven Surface  88. Not attempted due to medical  condi                                          tion or safety concerns  BE5403.M  1 Step (curb)                 88. Not attempted due to medical condi                                          tion or safety concerns  AA5120.N  4 Steps                       88. Not attempted due to medical condi                                          tion or safety concerns  SW0355.O  12 Steps                      88. Not attempted due to medical condi                                          tion or safety concerns  DR1388.P  Picking Up Object             88. Not attempted due to medical condi                                          tion or safety concerns  UH7667.Q1  Does the patient use a wheelchair and/or scooter?  0. No    UO1296.R  Wheel 50 Feet with Two Turns  88. Not attempted due to medical condi                                          tion or safety concerns  AZ6960.RR1  Type of Wheelchair/Scooter for 50 Feet with Two Turns  1. Manual    FP0421.S  Wheel 150 Feet                88. Not attempted due to medical condi                                          tion or safety concerns  LO9200.SS1  Type of Wheelchair/Scooter for 150 Feet  1. Manual        Section H.  Bladder and Bowel      . Bladder Continence:  4. Always incontinent    . Bowel Continence:  9. Not rated, patient had an ostomy or did not have a bowel movement for the  entire 3 days    Section I.  Active Diagnosis    .  Patient did not present with peripheral vascular disease (PVD),  peripheral arterial disease (PAD)  "or diabetes mellitus (DM) as a  comorbid/co-existing condition on admission.    Section J.  Health Conditions - Admission  . Pain Effect on Sleep - ?Over the past 5 days, how much of the time has  pain made it hard for you to sleep at night??:  4. Almost constantly    . Pain Interference with Therapy Activities - ?Over the past 5 days, how  often have you limited your participation in rehabilitation therapy sessions due  to pain?\":  1. Rarely or not at all    . Pain Interference with Day-to-Day Activities - ?Over the past 5 days, how  often have you limited your day-to-day activities (excluding rehabilitation  therapy sessions) because of pain??:  4. Almost constantly    . History of Falls - Has the patient had two or more falls in the past year  or any fall with injury in the past year?  0. No    . Prior Surgery - Did the patient have major surgery during the 100 days  prior to admission?  1. Yes    Section K.  Swallowing/Nutritional Status - Admission  . Nutritional Approaches On Admission:  Z. None    Section M. Skin Conditions - Admission  Does this patient have one or more unhealed pressure ulcers/injuries?  0. No    Section N. Medication - Admission      Did a complete drug regimen review identify potential clinically significant  medication issues?  0. No - No issues found during review    Section O. Special Treatments, Procedures, and Programs - Admission      C1. Oxygen Therapy, C2. Continuous    Signed by: Asha August, Supervisor    "

## 2025-07-31 NOTE — PLAN OF CARE
Goal Outcome Evaluation:  Plan of Care Reviewed With: patient        Progress: no change  Outcome Summary: Patient resting in bed at this time. VSS. Pt c/o pain, PRN pain medication given. Pt requested sleep medication, given per MAR. Purewick placed on pt per pt request. No concerns or complaints at this time. Will continue with plan of care.

## 2025-07-31 NOTE — SIGNIFICANT NOTE
07/31/25 1003   Plan   Plan Call received from daughter [place] to discuss pt's progress in therapy and discharge date of 08/07, and she was agreeable.  Daughter will provide transportation home on 08/07.  She will be here at 10:00am for education from nursing and therapy.
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
## 2025-07-31 NOTE — SIGNIFICANT NOTE
07/31/25 0930   Plan   Plan Team Conference was held this morning and discharge date is planned for 08/07/25.  She plans to return home with adult children at discharge.  Spoke to pt about her progress in therapy and discharge date of 08/07 and she is agreeable.  Attempted to contact daughter [place] without success, but I left a message about discharge date.  SS will continue to follow for discharge planning needs.

## 2025-07-31 NOTE — PLAN OF CARE
Goal Outcome Evaluation:  Plan of Care Reviewed With: patient        Progress: no change  Outcome Summary: BLUE SAFETY BAND NOTED; BRACE TO RIGHT DISTAL FOREARM/WRIST AREA; C COLLAR NOTED; FALL SAFETY INTERVENTION LEVELS EDUCATION GIVEN; CONTINUE PLAN OF CARE; MONITOR

## 2025-07-31 NOTE — THERAPY TREATMENT NOTE
Inpatient Rehabilitation - Occupational Therapy Treatment Note     Nav     Patient Name: Ninoska Stock  : 1980  MRN: [id number]    Today's Date: 2025                 Admit Date: 2025       No diagnosis found.    Patient Active Problem List   Diagnosis    Shortness of breath    Immunization due    Abnormal CT of the chest    Cigarette nicotine dependence without complication    Hypersomnia    Trauma       Past Medical History:   Diagnosis Date    Anxiety     Asthma     Depression     Heart disease     Multiple sclerosis        Past Surgical History:   Procedure Laterality Date    CHOLECYSTECTOMY      FEMUR FRACTURE SURGERY      FOOT SURGERY      HYSTERECTOMY      KIDNEY SURGERY      TUBAL ABDOMINAL LIGATION      WRIST FRACTURE SURGERY               IRF OT ASSESSMENT FLOWSHEET (Last 12 Hours)       IRF OT Evaluation and Treatment       Row Name 25 1520 25 1218       OT Time and Intention    Document Type daily treatment  -TM daily treatment  -LA    Mode of Treatment occupational therapy  -TM occupational therapy  -LA    Total Minutes, Occupational Therapy -- 45  -LA    Patient Effort good  -TM good  -LA    Symptoms Noted During/After Treatment none  -TM none  -LA      Row Name 25 1520 25 1218       General Information    Patient Profile Reviewed -- yes  -LA    General Observations of Patient Pt agreeable for therapy.  -TM Patient agreeable to therapy. Patient pleasant with good participation and good motivation to return to independence with ADL  -LA    Existing Precautions/Restrictions fall;cervical collar;brace on at all times;lifting;spinal;other (see comments)  OVIDIO SHETTY  -TM fall;cervical collar;brace on at all times;lifting  5# lifting restriction  -LA      Row Name 25 1218          Pain    Pretreatment Pain Rating 4/10  -LA     Posttreatment Pain Rating 4/10  -LA     Pain Location hip  -LA     Pain Side/Orientation right  -LA       Row Name 25 1521  07/31/25 1218       Cognition/Psychosocial    Affect/Mental Status (Cognition) -- WFL  -LA    Orientation Status (Cognition) oriented x 4  -TM oriented x 4  -LA    Follows Commands (Cognition) WFL  -TM WFL  -LA      Row Name 07/31/25 1520          Grooming    Position (Grooming) supported sitting  -TM     Comment (Grooming) Yesenia  -TM       Row Name 07/31/25 1520 07/31/25 1218       Motor Skills    Motor Skills coordination;functional endurance;motor control/coordination interventions  -TM coordination;functional endurance;motor control/coordination interventions  -LA    Coordination -- WFL  -LA    Functional Endurance -- fair  -LA    Motor Control/Coordination Interventions therapeutic exercise/ROM;fine motor manipulation/dexterity activities;gross motor coordination activities;other (see comments)  LUE ther ex/act, GMC/FMC; light RUE hand/fingers ther activities range of motion as tolerated  -TM fine motor manipulation/dexterity activities;gross motor coordination activities;occupation/activity based treatment;therapeutic exercise/ROM  -LA    Therapeutic Exercise -- shoulder;elbow/forearm;wrist;hand  -LA      Row Name 07/31/25 1218          Positioning and Restraints    Pre-Treatment Position sitting in chair/recliner  -LA     Post Treatment Position wheelchair  -LA     In Wheelchair sitting;call light within reach;encouraged to call for assist;exit alarm on  -LA               User Key  (r) = Recorded By, (t) = Taken By, (c) = Cosigned By      Initials Name Effective Dates     OrlandoHumera, OT 06/16/21 -     Radha Bonilla, OT 02/14/22 -                      Occupational Therapy Education       Title: PT OT SLP Therapies (Done)       Topic: Occupational Therapy (Done)       Point: ADL training (Done)       Learning Progress Summary            Patient Acceptance, E,D, VU,NR by  at 7/31/2025 8689    Acceptance, E,TB,D, VU,DU,NR by LA at 7/31/2025 1223                      Point: Home exercise program  (Done)       Learning Progress Summary            Patient Acceptance, E,TB,D, VU,DU,NR by LA at 7/31/2025 1223                      Point: Precautions (Done)       Learning Progress Summary            Patient Acceptance, E,D, VU,NR by  at 7/31/2025 1459    Acceptance, E,TB,D, VU,DU,NR by LA at 7/31/2025 1223                      Point: Body mechanics (Done)       Learning Progress Summary            Patient Acceptance, E,TB,D, VU,DU,NR by LA at 7/31/2025 1223                                      User Key       Initials Effective Dates Name Provider Type Discipline     06/16/21 -  Humera Perry OT Occupational Therapist OT    LA 02/14/22 -  Radha Sánchez OT Occupational Therapist OT                        OT Recommendation and Plan                         Time Calculation:      Time Calculation- OT       Row Name 07/31/25 1459 07/31/25 1223          Time Calculation- OT    OT Start Time 1245  -TM 1000  -LA     OT Stop Time 1330  -TM 1045  -LA     OT Time Calculation (min) 45 min  -TM 45 min  -LA     Total Timed Code Minutes- OT 45 minute(s)  -TM 45 minute(s)  -LA     OT Non-Billable Time (min) 15 min  -TM --               User Key  (r) = Recorded By, (t) = Taken By, (c) = Cosigned By      Initials Name Provider Type     Humera Perry, OT Occupational Therapist    Radha Bonilla OT Occupational Therapist                  Therapy Charges for Today       Code Description Service Date Service Provider Modifiers Qty    93954530737 HC OT SELF CARE/MGMT/TRAIN EA 15 MIN 7/31/2025 Humera Perry, OT GO 1    74167362023 HC OT THERAPEUTIC ACT EA 15 MIN 7/31/2025 Humera Perry OT GO 1    91940022935 HC OT THER PROC EA 15 MIN 7/31/2025 Humera Perry OT GO 1                     Humera Perry OT  7/31/2025

## 2025-07-31 NOTE — SIGNIFICANT NOTE
07/31/25 1003   Plan   Plan Call received from daughter [place] to discuss pt's progress in therapy and discharge date of 08/07, and she was agreeable.

## 2025-07-31 NOTE — PROGRESS NOTES
Problems/Goals  Skin Integrity (Body Function Structure)  Current Status: risk for impaired skin integrity  Long Term Goals  07/29/2025 06:04 PM - Active  no skin breakdown  Potential for Injury (Safety)  Current Status: risk for falls  Short Term Goals  07/29/2025 06:06 PM - Active  no falls  Long Term Goals  07/29/2025 06:05 PM - Active  no falls    Signed by: Alyssa Allison RN

## 2025-08-01 PROCEDURE — 94799 UNLISTED PULMONARY SVC/PX: CPT

## 2025-08-01 PROCEDURE — 97530 THERAPEUTIC ACTIVITIES: CPT

## 2025-08-01 PROCEDURE — 94760 N-INVAS EAR/PLS OXIMETRY 1: CPT

## 2025-08-01 PROCEDURE — 25010000002 HEPARIN (PORCINE) PER 1000 UNITS: Performed by: INTERNAL MEDICINE

## 2025-08-01 PROCEDURE — 97110 THERAPEUTIC EXERCISES: CPT

## 2025-08-01 PROCEDURE — 97535 SELF CARE MNGMENT TRAINING: CPT

## 2025-08-01 PROCEDURE — 99231 SBSQ HOSP IP/OBS SF/LOW 25: CPT | Performed by: INTERNAL MEDICINE

## 2025-08-01 PROCEDURE — 97116 GAIT TRAINING THERAPY: CPT

## 2025-08-01 PROCEDURE — 94664 DEMO&/EVAL PT USE INHALER: CPT

## 2025-08-01 RX ADMIN — BUDESONIDE AND FORMOTEROL FUMARATE DIHYDRATE 2 PUFF: 160; 4.5 AEROSOL RESPIRATORY (INHALATION) at 07:19

## 2025-08-01 RX ADMIN — OXYCODONE HYDROCHLORIDE 10 MG: 10 TABLET ORAL at 20:35

## 2025-08-01 RX ADMIN — ACETAMINOPHEN 1000 MG: 500 TABLET, FILM COATED ORAL at 05:04

## 2025-08-01 RX ADMIN — HYDROXYZINE HYDROCHLORIDE 25 MG: 25 TABLET, FILM COATED ORAL at 20:35

## 2025-08-01 RX ADMIN — HEPARIN SODIUM 5000 UNITS: 5000 INJECTION INTRAVENOUS; SUBCUTANEOUS at 21:01

## 2025-08-01 RX ADMIN — GABAPENTIN 800 MG: 400 CAPSULE ORAL at 21:00

## 2025-08-01 RX ADMIN — HEPARIN SODIUM 5000 UNITS: 5000 INJECTION INTRAVENOUS; SUBCUTANEOUS at 05:04

## 2025-08-01 RX ADMIN — HYDROCHLOROTHIAZIDE 25 MG: 25 TABLET ORAL at 08:14

## 2025-08-01 RX ADMIN — MELOXICAM 7.5 MG: 7.5 TABLET ORAL at 08:14

## 2025-08-01 RX ADMIN — OXYCODONE HYDROCHLORIDE 10 MG: 10 TABLET ORAL at 14:38

## 2025-08-01 RX ADMIN — DOCUSATE SODIUM AND SENNOSIDES 2 TABLET: 8.6; 5 TABLET, FILM COATED ORAL at 08:13

## 2025-08-01 RX ADMIN — DULOXETINE 60 MG: 60 CAPSULE, DELAYED RELEASE ORAL at 20:36

## 2025-08-01 RX ADMIN — OXYCODONE HYDROCHLORIDE 10 MG: 10 TABLET ORAL at 08:11

## 2025-08-01 RX ADMIN — MODAFINIL 200 MG: 100 TABLET ORAL at 08:15

## 2025-08-01 RX ADMIN — DULOXETINE 60 MG: 60 CAPSULE, DELAYED RELEASE ORAL at 08:14

## 2025-08-01 RX ADMIN — Medication 10 MG: at 20:36

## 2025-08-01 RX ADMIN — GABAPENTIN 800 MG: 400 CAPSULE ORAL at 14:38

## 2025-08-01 RX ADMIN — VERAPAMIL HYDROCHLORIDE 120 MG: 120 TABLET, FILM COATED, EXTENDED RELEASE ORAL at 08:13

## 2025-08-01 RX ADMIN — BUSPIRONE HYDROCHLORIDE 10 MG: 10 TABLET ORAL at 08:13

## 2025-08-01 RX ADMIN — Medication 1000 MCG: at 08:15

## 2025-08-01 RX ADMIN — CARIPRAZINE 6 MG: 3 CAPSULE, GELATIN COATED ORAL at 08:14

## 2025-08-01 RX ADMIN — BUSPIRONE HYDROCHLORIDE 10 MG: 10 TABLET ORAL at 20:36

## 2025-08-01 RX ADMIN — HEPARIN SODIUM 5000 UNITS: 5000 INJECTION INTRAVENOUS; SUBCUTANEOUS at 14:39

## 2025-08-01 RX ADMIN — FLUOXETINE HYDROCHLORIDE 20 MG: 20 CAPSULE ORAL at 08:13

## 2025-08-01 RX ADMIN — GABAPENTIN 800 MG: 400 CAPSULE ORAL at 05:04

## 2025-08-01 RX ADMIN — BUDESONIDE AND FORMOTEROL FUMARATE DIHYDRATE 2 PUFF: 160; 4.5 AEROSOL RESPIRATORY (INHALATION) at 19:35
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
## 2025-08-01 NOTE — PROGRESS NOTES
Problems/Goals  Skin Integrity (Body Function Structure)  Current Status: risk for impaired skin integrity  Long Term Goals  07/29/2025 06:04 PM - Active  no skin breakdown  Potential for Injury (Safety)  Current Status: risk for falls  Short Term Goals  07/29/2025 06:06 PM - Active  no falls  Long Term Goals  07/29/2025 06:05 PM - Active  no falls    Signed by: Fang Morse, Nurse
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
## 2025-08-01 NOTE — THERAPY TREATMENT NOTE
Inpatient Rehabilitation - Physical Therapy Treatment Note        Nav     Patient Name: Ninoska Stock  : 1980  MRN: [id number]    Today's Date: 2025                    Admit Date: 2025      Visit Dx:   No diagnosis found.    Patient Active Problem List   Diagnosis    Shortness of breath    Immunization due    Abnormal CT of the chest    Cigarette nicotine dependence without complication    Hypersomnia    Trauma       Past Medical History:   Diagnosis Date    Anxiety     Asthma     Depression     Heart disease     Multiple sclerosis        Past Surgical History:   Procedure Laterality Date    CHOLECYSTECTOMY      FEMUR FRACTURE SURGERY      FOOT SURGERY      HYSTERECTOMY      KIDNEY SURGERY      TUBAL ABDOMINAL LIGATION      WRIST FRACTURE SURGERY         PT ASSESSMENT (Last 12 Hours)       IRF PT Evaluation and Treatment       Row Name 25 1554 25 1500       PT Time and Intention    Document Type daily treatment  - daily treatment  -RG    Mode of Treatment individual therapy;physical therapy  -HC individual therapy;physical therapy  -RG    Patient/Family/Caregiver Comments/Observations Pt and RN in agreement for AM PT session. Pt walked 80' then 160' x 2 with RW CGA. Sitting exercises with added Wt/ resistance.  - Pt and nursing in agreement for skilled PT on this date.  -RG      Row Name 25 1554 25 1500       General Information    Existing Precautions/Restrictions fall;cervical collar;brace on at all times;lifting  -HC fall;cervical collar;brace on at all times;lifting  -RG      Row Name 25 1554 25 1500       Pain Scale: FACES Pre/Post-Treatment    Pain: FACES Scale, Pretreatment 4-->hurts little more  -HC 4-->hurts little more  -RG    Posttreatment Pain Rating 4-->hurts little more  -HC 4-->hurts little more  -RG      Row Name 25 1554 25 1500       Cognition/Psychosocial    Affect/Mental Status (Cognition) WFL  -HC WFL  -RG    Follows  Commands (Cognition) WFL  -HC WFL  -    Personal Safety Interventions fall prevention program maintained;nonskid shoes/slippers when out of bed;supervised activity  - gait belt;fall prevention program maintained;nonskid shoes/slippers when out of bed  -      Row Name 08/01/25 1554 08/01/25 1500       Mobility    Extremity Weight-bearing Status left lower extremity;right lower extremity;left upper extremity;right upper extremity  - left lower extremity;right lower extremity;left upper extremity;right upper extremity  -    Left Upper Extremity (Weight-bearing Status) weight-bearing as tolerated (WBAT)  -HC weight-bearing as tolerated (WBAT)  -RG    Right Upper Extremity (Weight-bearing Status) non weight-bearing (NWB)  can use a platform walker  -HC non weight-bearing (NWB)  can use a platform walker  -RG    Left Lower Extremity (Weight-bearing Status) weight-bearing as tolerated (WBAT)  -HC weight-bearing as tolerated (WBAT)  -RG    Right Lower Extremity (Weight-bearing Status) weight-bearing as tolerated (WBAT)  -HC weight-bearing as tolerated (WBAT)  -      Row Name 08/01/25 1554 08/01/25 1500       Bed Mobility    Bed Mobility supine-sit;sit-supine;supine-sit-supine  - supine-sit;sit-supine;supine-sit-supine  -      Row Name 08/01/25 1554 08/01/25 1500       Transfer Assessment/Treatment    Transfers bed-chair transfer;chair-bed transfer;sit-stand transfer;stand-sit transfer;stand pivot/stand step transfer;toilet transfer  - bed-chair transfer;chair-bed transfer;sit-stand transfer;stand-sit transfer;stand pivot/stand step transfer;toilet transfer  -      Row Name 08/01/25 1500          Chair-Bed Transfer    Chair-Bed Lizella (Transfers) verbal cues;nonverbal cues (demo/gesture);contact guard  -       Row Name 08/01/25 1554 08/01/25 1500       Sit-Stand Transfer    Sit-Stand Lizella (Transfers) contact guard;verbal cues;nonverbal cues (demo/gesture)  - contact guard;minimum assist  (75% patient effort);verbal cues;nonverbal cues (demo/gesture)  -RG    Assistive Device (Sit-Stand Transfers) wheelchair;walker, rolling platform  - wheelchair;walker, rolling platform  -RG      Row Name 08/01/25 1554 08/01/25 1500       Stand-Sit Transfer    Stand-Sit Ruby (Transfers) contact guard;verbal cues;nonverbal cues (demo/gesture)  - contact guard;minimum assist (75% patient effort);verbal cues;nonverbal cues (demo/gesture)  -RG    Assistive Device (Stand-Sit Transfers) wheelchair;walker, rolling platform  -HC wheelchair;walker, rolling platform  -RG      Row Name 08/01/25 1554 08/01/25 1500       Stand Pivot/Stand Step Transfer    Stand Pivot/Stand Step Ruby (Transfers) verbal cues;nonverbal cues (demo/gesture);contact guard  - verbal cues;nonverbal cues (demo/gesture);contact guard;minimum assist (75% patient effort)  -    Assistive Device (Stand Pivot Stand Step Transfer) --  bed rail  - --  bed rail  -      Row Name 08/01/25 1554 08/01/25 1500       Gait/Stairs (Locomotion)    Ruby Level (Gait) 1 person to manage equipment;verbal cues;nonverbal cues (demo/gesture);contact guard  - minimum assist (75% patient effort);1 person to manage equipment;verbal cues;nonverbal cues (demo/gesture)  -    Assistive Device (Gait) walker, front-wheeled  - walker, front-wheeled  -    Distance in Feet (Gait) --  80', 160' x 2  -HC --    Deviations/Abnormal Patterns (Gait) magan decreased;gait speed decreased;stride length decreased  - magan decreased;gait speed decreased;stride length decreased  -RG    Bilateral Gait Deviations weight shift ability decreased  - weight shift ability decreased  -RG      Row Name 08/01/25 1554 08/01/25 1500       Safety Issues/Impairments Affecting Functional Mobility    Impairments Affecting Function (Mobility) balance;endurance/activity tolerance;pain;strength  - balance;endurance/activity tolerance;pain;strength  -      Row Name  08/01/25 1554          Motor Skills    Therapeutic Exercise other (see comments)  Sitting 2.5#: AP, LAQ, march, ball sq, GTB: HS curls, hip abd, march.  -       Row Name 08/01/25 1500          Hip (Therapeutic Exercise)    Hip Strengthening (Therapeutic Exercise) bilateral;flexion;aBduction;aDduction;marching while standing;standing;10 repetitions;2 sets  -RG       Row Name 08/01/25 1500          Knee (Therapeutic Exercise)    Knee Strengthening (Therapeutic Exercise) bilateral;flexion;extension;marching while standing;LAQ (long arc quad);standing;10 repetitions;2 sets  -RG       Row Name 08/01/25 1500          Ankle (Therapeutic Exercise)    Ankle Strengthening (Therapeutic Exercise) bilateral;dorsiflexion;plantarflexion;sitting;10 repetitions;2 sets  -RG       Row Name 08/01/25 1554 08/01/25 1500       Positioning and Restraints    Pre-Treatment Position other (comment)  sitting in WC  - sitting in chair/recliner  -RG    Post Treatment Position wheelchair  - bed  -RG    In Bed -- notified nsg;supine;call light within reach;encouraged to call for assist;legs elevated  -RG    In Wheelchair sitting;call light within reach;encouraged to call for assist  - --      Row Name 08/01/25 1554 08/01/25 1500       Therapy Assessment/Plan (PT)    Patient's Goals For Discharge return home  - return home  -      Row Name 08/01/25 1554 08/01/25 1500       Therapy Assessment/Plan (PT)    Rehab Potential/Prognosis (PT) good  - good  -    Frequency of Treatment (PT) 5 times per week  - 5 times per week  -RG    Estimated Duration of Therapy (PT) 2 weeks  - 2 weeks  -RG    Problem List (PT) balance;mobility;strength;pain  <tolerance to activity/endurance  - balance;mobility;strength;pain  <tolerance to activity/endurance  -RG    Activity Limitations Related to Problem List (PT) unable to ambulate safely;unable to transfer safely  - unable to ambulate safely;unable to transfer safely  -      Row Name 08/01/25  1554 08/01/25 1500       Therapy Plan Review/Discharge Plan (PT)    Anticipated Equipment Needs at Discharge (PT Eval) --  tbd  -HC --  tbd  -RG    Anticipated Discharge Disposition (PT) home with outpatient therapy services  - home with outpatient therapy services  -RG      Row Name 08/01/25 1554 08/01/25 1500       IRF PT Goals    Bed Mobility Goal Selection (PT-IRF) bed mobility, PT goal 1  -HC bed mobility, PT goal 1  -RG    Transfer Goal Selection (PT-IRF) transfers, PT goal 1  -HC transfers, PT goal 1  -RG    Gait (Walking Locomotion) Goal Selection (PT-IRF) gait, PT goal 1  -HC gait, PT goal 1  -RG      Row Name 08/01/25 1554 08/01/25 1500       Bed Mobility Goal 1 (PT-IRF)    Activity/Assistive Device (Bed Mobility Goal 1, PT-IRF) sit to supine/supine to sit  -HC sit to supine/supine to sit  -RG    [place] Level (Bed Mobility Goal 1, PT-IRF) modified independence  -HC modified independence  -RG    Time Frame (Bed Mobility Goal 1, PT-IRF) by discharge  - by discharge  -RG      Row Name 08/01/25 1554 08/01/25 1500       Transfer Goal 1 (PT-IRF)    Activity/Assistive Device (Transfer Goal 1, PT-IRF) sit-to-stand/stand-to-sit;bed-to-chair/chair-to-bed  -HC sit-to-stand/stand-to-sit;bed-to-chair/chair-to-bed  -RG    [place] Level (Transfer Goal 1, PT-IRF) modified independence  -HC modified independence  -RG    Time Frame (Transfer Goal 1, PT-IRF) by discharge  - by discharge  -RG      Row Name 08/01/25 1554 08/01/25 1500       Gait/Walking Locomotion Goal 1 (PT-IRF)    Activity/Assistive Device (Gait/Walking Locomotion Goal 1, PT-IRF) walker, rolling platform  -HC walker, rolling platform  -RG    Gait/Walking Locomotion Distance Goal 1 (PT-IRF) 300'  - 300'  -RG    [place] Level (Gait/Walking Locomotion Goal 1, PT-IRF) modified independence  -HC modified independence  -RG    Time Frame (Gait/Walking Locomotion Goal 1, PT-IRF) by discharge  - by discharge  -              User Key  (r) =  Recorded By, (t) = Taken By, (c) = Cosigned By      Initials Name Provider Type    Scott Hahn PTA Physical Therapist Assistant    HC Maryam Wills PTA Physical Therapist Assistant                  Wound 07/29/25 1842 Right anterior greater trochanter Surgical (Active)   Dressing Appearance intact;dry 08/01/25 0735   Closure Steri strips 08/01/25 0504   Base unable to visualize 08/01/25 0735   Drainage Characteristics/Odor brown 08/01/25 0504   Drainage Amount none 08/01/25 0735   Care, Wound cleansed with;sterile normal saline 08/01/25 0504   Dressing Care dressing changed 08/01/25 0504       Wound 07/29/25 1843 Right distal wrist Surgical (Active)   Dressing Appearance dry;intact 08/01/25 0735   Base unable to visualize 08/01/25 0735   Drainage Amount none 08/01/25 0735       Wound 07/29/25 1844 Right anterior temporal region (Active)   Dressing Appearance open to air 08/01/25 0735   Closure Approximated 07/31/25 1920   Base clean;dry;scab 08/01/25 0735   Periwound pink 08/01/25 0735   Periwound Temperature warm 08/01/25 0735   Periwound Skin Turgor soft 08/01/25 0735   Drainage Amount none 07/31/25 1920   Care, Wound other (see comments) 07/31/25 1920   Dressing Care open to air 08/01/25 0735       Wound 07/29/25 1849 Right lateral thigh (Active)   Dressing Appearance dry;intact 08/01/25 0735   Closure Unable to assess 08/01/25 0735   Base clean;dry 07/31/25 1920   Periwound dry;pink 08/01/25 0735   Periwound Skin Turgor soft 08/01/25 0735   Drainage Amount none 08/01/25 0735   Care, Wound other (see comments) 07/31/25 1920     Physical Therapy Education       Title: PT OT SLP Therapies (Done)       Topic: Physical Therapy (Done)       Point: Mobility training (Done)       Learning Progress Summary            Patient Acceptance, E, VU,DU by  at 8/1/2025 1558    Acceptance, E,D, VU,NR by RG at 8/1/2025 1540    Acceptance, E,D, VU,NR by RG at 7/31/2025 1504    Acceptance, E, VU,NR by LB at  7/30/2025 1205                      Point: Home exercise program (Done)       Learning Progress Summary            Patient Acceptance, E, VU,DU by HC at 8/1/2025 1558    Acceptance, E,D, VU,NR by RG at 8/1/2025 1540    Acceptance, E,D, VU,NR by RG at 7/31/2025 1504    Acceptance, E, VU,NR by LB at 7/30/2025 1205                      Point: Body mechanics (Done)       Learning Progress Summary            Patient Acceptance, E, VU,DU by HC at 8/1/2025 1558    Acceptance, E,D, VU,NR by RG at 8/1/2025 1540    Acceptance, E,D, VU,NR by RG at 7/31/2025 1504    Acceptance, E, VU,NR by LB at 7/30/2025 1205                      Point: Precautions (Done)       Learning Progress Summary            Patient Acceptance, E, VU,DU by HC at 8/1/2025 1558    Acceptance, E,D, VU,NR by RG at 8/1/2025 1540    Acceptance, E,D, VU,NR by RG at 7/31/2025 1504    Acceptance, E, VU,NR by LB at 7/30/2025 1205                                      User Key       Initials Effective Dates Name Provider Type Discipline     06/16/21 -  Nicolette Pierce, PT Physical Therapist PT     06/16/21 -  Scott Bhatia PTA Physical Therapist Assistant PT     01/20/23 -  Maryam Wills PTA Physical Therapist Assistant PT                    PT Recommendation and Plan    Frequency of Treatment (PT): 5 times per week  Anticipated Equipment Needs at Discharge (PT Eval):  (tbd)                  Time Calculation:      PT Charges       Row Name 08/01/25 1558 08/01/25 1541          Time Calculation    Start Time 1045  - 1330  -     Stop Time 1130  - 1415  -     Time Calculation (min) 45 min  - 45 min  -     PT Received On 08/01/25  - 08/01/25  -        Time Calculation- PT    Total Timed Code Minutes- PT 45 minute(s)  -HC 45 minute(s)  -               User Key  (r) = Recorded By, (t) = Taken By, (c) = Cosigned By      Initials Name Provider Type     Scott Bhatia PTA Physical Therapist Assistant    Maryam Titus, PTA  Physical Therapist Assistant                    Therapy Charges for Today       Code Description Service Date Service Provider Modifiers Qty    4198106 HC GAIT TRAINING EA 15 MIN 8/1/2025 Maryam Wills, MAKENZIE GP, CQ 1    56164607600 HC PT THER PROC EA 15 MIN 8/1/2025 Maryam Wills, MAKENZIE GP, CQ 1    13269392235 HC PT THERAPEUTIC ACT EA 15 MIN 8/1/2025 Maryam Wills, MAKENZIE GP, CQ 1              PT G-Codes  AM-PAC 6 Clicks Score (PT): 16      Maryam Wills PTA  8/1/2025

## 2025-08-01 NOTE — PROGRESS NOTES
Assisted By: Patient seen at bedside    CC: Follow-up on multiple injuries from trauma.  Injuries included right scalp avulsion and repair, right proximal femur fracture, right distal radius fracture, C2 vertebral body fracture.    Interview History/HPI: Patient states she is doing well, she still has pain but it is under at least adequate control.  She is tolerating her diet, she did get some sleep through the night.  She was seen by psychiatry yesterday, some medications adjustments made.  She does not voice that these changes have had any significant adverse effect.          Current Hospital Meds:  budesonide-formoterol, 2 puff, Inhalation, BID - RT   And  revefenacin, 175 mcg, Nebulization, Daily - RT  busPIRone, 10 mg, Oral, Q12H  Cariprazine HCl, 6 mg, Oral, Daily  DULoxetine, 60 mg, Oral, Q12H  FLUoxetine, 20 mg, Oral, Daily  gabapentin, 800 mg, Oral, Q8H  heparin (porcine), 5,000 Units, Subcutaneous, Q8H  hydroCHLOROthiazide, 25 mg, Oral, Daily  melatonin, 10 mg, Oral, Nightly  meloxicam, 7.5 mg, Oral, Daily  modafinil, 200 mg, Oral, Daily  polyethylene glycol, 17 g, Oral, Daily  senna-docusate sodium, 2 tablet, Oral, BID  verapamil SR, 120 mg, Oral, Q24H  vitamin B-12, 1,000 mcg, Oral, Daily    Pharmacy Consult - Pharmacy to dose,         Vitals:    08/01/25 0730   BP: 111/70   Pulse: 106   Resp: 18   Temp: 98 °F (36.7 °C)   SpO2: 95%         Intake/Output Summary (Last 24 hours) at 8/1/2025 1104  Last data filed at 8/1/2025 0900  Gross per 24 hour   Intake 1320 ml   Output 950 ml   Net 370 ml       EXAM: Her mood is good, she has no edema, she can dorsi and plantarflex her ankles, she has a c-collar in place, lungs are clear heart regular rate and rhythm speech normal, scalp laceration healing well      Diet: Regular/House; Fluid Consistency: Thin (IDDSI 0)        LABS:     Lab Results (last 48 hours)       ** No results found for the last 48 hours. **          As a 7/30 CMP was essentially normal, CBC had  a white count of 9.6 hemoglobin 9.5 platelet count of 5/27, that hemoglobin was stable      Radiology:    Imaging Results (Last 72 Hours)       ** No results found for the last 72 hours. **                Assessment/Plan:   Multiple trauma injuries as delineated above.  C2 vertebral body fracture, right wrist fracture, right femur fracture, scalp laceration status post repair.  Patient is weight-bear as tolerated on the lower extremities, the right upper extremity she cannot bear weight below the elbow but weight-bear as tolerated from the elbow up.  Will continue oxycodone as needed for pain.  Continue as needed Tylenol and Robaxin.  Patient is participating with OT and PT and progressing very well, with PT, patient was contact-guard to min assist sit to stand stand to sit, patient was contact-guard to min assist for toilet transfer, patient was able to walk 160 feet x 2 min assist.  With OT, she is mod assist bathing, min assist upper body dressing, min to mod lower body dressing, min to mod for toileting.  I feel prognosis is good with ongoing PT/OT.    Scalp laceration, Vaseline twice daily, healing well     Anxiety and depression, adjustments made in her medication per psychiatry yesterday.  Mood remains good here today.     Asthma, stable, patient quit smoking actually 3 years ago,  Continue as needed albuterol, continue breztri equivalent      Hypertension, on Calan and HCTZ, controlled     Morbid obesity as evidenced by BMI greater than 40, adversely affecting her overall health and acute rehabilitation.     VTE prophylaxis, continue subcu heparin     Chronic pain syndrome, patient is on gabapentin and Norco at home, Norco has been held as she is on oxycodone, continuing gabapentin    Patient did have a BM yesterday.    Emerson Charlton MD

## 2025-08-01 NOTE — THERAPY TREATMENT NOTE
Inpatient Rehabilitation - Occupational Therapy Treatment Note     Nav     Patient Name: Ninoska Stock  : 1980  MRN: [id number]    Today's Date: 2025                 Admit Date: 2025       No diagnosis found.    Patient Active Problem List   Diagnosis    Shortness of breath    Immunization due    Abnormal CT of the chest    Cigarette nicotine dependence without complication    Hypersomnia    Trauma       Past Medical History:   Diagnosis Date    Anxiety     Asthma     Depression     Heart disease     Multiple sclerosis        Past Surgical History:   Procedure Laterality Date    CHOLECYSTECTOMY      FEMUR FRACTURE SURGERY      FOOT SURGERY      HYSTERECTOMY      KIDNEY SURGERY      TUBAL ABDOMINAL LIGATION      WRIST FRACTURE SURGERY               IRF OT ASSESSMENT FLOWSHEET (Last 12 Hours)       IRF OT Evaluation and Treatment       Row Name 25 1354          OT Time and Intention    Document Type daily treatment  -TM     Mode of Treatment occupational therapy  -TM     Patient Effort good  -TM     Symptoms Noted During/After Treatment none  -TM       Row Name 25 1350          General Information    General Observations of Patient Pt agreeable for therapy.  -TM     Existing Precautions/Restrictions fall;cervical collar;brace on at all times;lifting;spinal;other (see comments)  RUE NWB  -TM       Row Name 25 1351          Cognition/Psychosocial    Orientation Status (Cognition) oriented x 4  -TM     Follows Commands (Cognition) WFL  -TM       Row Name 25 1350          Motor Skills    Motor Skills coordination;functional endurance;motor control/coordination interventions  -TM     Motor Control/Coordination Interventions therapeutic exercise/ROM;fine motor manipulation/dexterity activities;gross motor coordination activities;other (see comments)  LUE ther ex/act, GMC/FMC; light RUE hand/fingers ther activities range of motion as tolerated  -TM               User Key  (r) =  Recorded By, (t) = Taken By, (c) = Cosigned By      Initials Name Effective Dates    TM Humera Perry OT 06/16/21 -                      Occupational Therapy Education       Title: PT OT SLP Therapies (Done)       Topic: Occupational Therapy (Done)       Point: ADL training (Done)       Learning Progress Summary            Patient Acceptance, E,D, VU,NR by TM at 8/1/2025 1354    Acceptance, E,D, VU,NR by TM at 7/31/2025 1459    Acceptance, E,TB,D, VU,DU,NR by LA at 7/31/2025 1223                      Point: Home exercise program (Done)       Learning Progress Summary            Patient Acceptance, E,TB,D, VU,DU,NR by LA at 7/31/2025 1223                      Point: Precautions (Done)       Learning Progress Summary            Patient Acceptance, E,D, VU,NR by TM at 8/1/2025 1354    Acceptance, E,D, VU,NR by TM at 7/31/2025 1459    Acceptance, E,TB,D, VU,DU,NR by LA at 7/31/2025 1223                      Point: Body mechanics (Done)       Learning Progress Summary            Patient Acceptance, E,TB,D, VU,DU,NR by LA at 7/31/2025 1223                                      User Key       Initials Effective Dates Name Provider Type Discipline     06/16/21 -  Humera Perry OT Occupational Therapist OT    LA 02/14/22 -  Radha Sánchez OT Occupational Therapist OT                        OT Recommendation and Plan                         Time Calculation:      Time Calculation- OT       Row Name 08/01/25 1354             Time Calculation- OT    OT Start Time 1245  -TM      OT Stop Time 1330  -TM      OT Time Calculation (min) 45 min  -TM      Total Timed Code Minutes- OT 45 minute(s)  -TM      OT Non-Billable Time (min) 10 min  -TM                User Key  (r) = Recorded By, (t) = Taken By, (c) = Cosigned By      Initials Name Provider Type     Humera Perry, OT Occupational Therapist                  Therapy Charges for Today       Code Description Service Date Service Provider Modifiers Qty     08004358789 HC OT SELF CARE/MGMT/TRAIN EA 15 MIN 7/31/2025 Humera Perry, OT GO 1    85647634423 HC OT THERAPEUTIC ACT EA 15 MIN 7/31/2025 Humera Perry, OT GO 1    29010562936 HC OT THER PROC EA 15 MIN 7/31/2025 Humera Perry, OT GO 1    89877001842 HC OT THERAPEUTIC ACT EA 15 MIN 8/1/2025 Humera Perry, OT GO 3                     Humera Perry OT  8/1/2025

## 2025-08-01 NOTE — THERAPY TREATMENT NOTE
Inpatient Rehabilitation - Physical Therapy Treatment Note        Nav     Patient Name: Ninoska Stock  : 1980  MRN: [id number]    Today's Date: 2025                    Admit Date: 2025      Visit Dx:   No diagnosis found.    Patient Active Problem List   Diagnosis    Shortness of breath    Immunization due    Abnormal CT of the chest    Cigarette nicotine dependence without complication    Hypersomnia    Trauma       Past Medical History:   Diagnosis Date    Anxiety     Asthma     Depression     Heart disease     Multiple sclerosis        Past Surgical History:   Procedure Laterality Date    CHOLECYSTECTOMY      FEMUR FRACTURE SURGERY      FOOT SURGERY      HYSTERECTOMY      KIDNEY SURGERY      TUBAL ABDOMINAL LIGATION      WRIST FRACTURE SURGERY         PT ASSESSMENT (Last 12 Hours)       IRF PT Evaluation and Treatment       Row Name 25 1500          PT Time and Intention    Document Type daily treatment  -RG     Mode of Treatment individual therapy;physical therapy  -RG     Patient/Family/Caregiver Comments/Observations Pt and nursing in agreement for skilled PT on this date.  -RG       Row Name 25 1500          General Information    Existing Precautions/Restrictions fall;cervical collar;brace on at all times;lifting  -RG       Row Name 25 1500          Pain Scale: FACES Pre/Post-Treatment    Pain: FACES Scale, Pretreatment 4-->hurts little more  -RG     Posttreatment Pain Rating 4-->hurts little more  -RG       Row Name 25 1500          Cognition/Psychosocial    Affect/Mental Status (Cognition) WFL  -RG     Follows Commands (Cognition) WFL  -RG     Personal Safety Interventions gait belt;fall prevention program maintained;nonskid shoes/slippers when out of bed  -RG       Row Name 25 1500          Mobility    Extremity Weight-bearing Status left lower extremity;right lower extremity;left upper extremity;right upper extremity  -RG     Left Upper Extremity  (Weight-bearing Status) weight-bearing as tolerated (WBAT)  -     Right Upper Extremity (Weight-bearing Status) non weight-bearing (NWB)  can use a platform walker  -     Left Lower Extremity (Weight-bearing Status) weight-bearing as tolerated (WBAT)  -     Right Lower Extremity (Weight-bearing Status) weight-bearing as tolerated (WBAT)  -       Row Name 08/01/25 1500          Bed Mobility    Bed Mobility supine-sit;sit-supine;supine-sit-supine  -       Row Name 08/01/25 1500          Transfer Assessment/Treatment    Transfers bed-chair transfer;chair-bed transfer;sit-stand transfer;stand-sit transfer;stand pivot/stand step transfer;toilet transfer  -       Row Name 08/01/25 1500          Chair-Bed Transfer    Chair-Bed Person (Transfers) verbal cues;nonverbal cues (demo/gesture);contact guard  -       Row Name 08/01/25 1500          Sit-Stand Transfer    Sit-Stand Person (Transfers) contact guard;minimum assist (75% patient effort);verbal cues;nonverbal cues (demo/gesture)  -     Assistive Device (Sit-Stand Transfers) wheelchair;walker, rolling platform  -       Row Name 08/01/25 1500          Stand-Sit Transfer    Stand-Sit Person (Transfers) contact guard;minimum assist (75% patient effort);verbal cues;nonverbal cues (demo/gesture)  -     Assistive Device (Stand-Sit Transfers) wheelchair;walker, rolling platform  -       Row Name 08/01/25 1500          Stand Pivot/Stand Step Transfer    Stand Pivot/Stand Step Person (Transfers) verbal cues;nonverbal cues (demo/gesture);contact guard;minimum assist (75% patient effort)  -     Assistive Device (Stand Pivot Stand Step Transfer) --  bed rail  -       Row Name 08/01/25 1500          Gait/Stairs (Locomotion)    Person Level (Gait) minimum assist (75% patient effort);1 person to manage equipment;verbal cues;nonverbal cues (demo/gesture)  -     Assistive Device (Gait) walker, front-wheeled  -      Deviations/Abnormal Patterns (Gait) magan decreased;gait speed decreased;stride length decreased  -RG     Bilateral Gait Deviations weight shift ability decreased  -       Row Name 08/01/25 1500          Safety Issues/Impairments Affecting Functional Mobility    Impairments Affecting Function (Mobility) balance;endurance/activity tolerance;pain;strength  -[facility] Name 08/01/25 1500          Hip (Therapeutic Exercise)    Hip Strengthening (Therapeutic Exercise) bilateral;flexion;aBduction;aDduction;marching while standing;standing;10 repetitions;2 sets  -[facility] Name 08/01/25 1500          Knee (Therapeutic Exercise)    Knee Strengthening (Therapeutic Exercise) bilateral;flexion;extension;marching while standing;LAQ (long arc quad);standing;10 repetitions;2 sets  -[facility] Name 08/01/25 1500          Ankle (Therapeutic Exercise)    Ankle Strengthening (Therapeutic Exercise) bilateral;dorsiflexion;plantarflexion;sitting;10 repetitions;2 sets  -[facility] Name 08/01/25 1500          Positioning and Restraints    Pre-Treatment Position sitting in chair/recliner  -RG     Post Treatment Position bed  -RG     In Bed notified nsg;supine;call light within reach;encouraged to call for assist;legs elevated  -[facility] Name 08/01/25 1500          Therapy Assessment/Plan (PT)    Patient's Goals For Discharge return home  -[facility] Name 08/01/25 1500          Therapy Assessment/Plan (PT)    Rehab Potential/Prognosis (PT) good  -RG     Frequency of Treatment (PT) 5 times per week  -RG     Estimated Duration of Therapy (PT) 2 weeks  -RG     Problem List (PT) balance;mobility;strength;pain  <tolerance to activity/endurance  -RG     Activity Limitations Related to Problem List (PT) unable to ambulate safely;unable to transfer safely  -[facility] Name 08/01/25 1500          Therapy Plan Review/Discharge Plan (PT)    Anticipated Equipment Needs at Discharge (PT Eval) --  tbd  -RG     Anticipated Discharge  Disposition (PT) home with outpatient therapy services  -RG       Row Name 08/01/25 1500          IRF PT Goals    Bed Mobility Goal Selection (PT-IRF) bed mobility, PT goal 1  -RG     Transfer Goal Selection (PT-IRF) transfers, PT goal 1  -RG     Gait (Walking Locomotion) Goal Selection (PT-IRF) gait, PT goal 1  -RG       Row Name 08/01/25 1500          Bed Mobility Goal 1 (PT-IRF)    Activity/Assistive Device (Bed Mobility Goal 1, PT-IRF) sit to supine/supine to sit  -RG     [place] Level (Bed Mobility Goal 1, PT-IRF) modified independence  -RG     Time Frame (Bed Mobility Goal 1, PT-IRF) by discharge  -RG       Row Name 08/01/25 1500          Transfer Goal 1 (PT-IRF)    Activity/Assistive Device (Transfer Goal 1, PT-IRF) sit-to-stand/stand-to-sit;bed-to-chair/chair-to-bed  -RG     [place] Level (Transfer Goal 1, PT-IRF) modified independence  -RG     Time Frame (Transfer Goal 1, PT-IRF) by discharge  -RG       Row Name 08/01/25 1500          Gait/Walking Locomotion Goal 1 (PT-IRF)    Activity/Assistive Device (Gait/Walking Locomotion Goal 1, PT-IRF) walker, rolling platform  -RG     Gait/Walking Locomotion Distance Goal 1 (PT-IRF) 300'  -RG     [place] Level (Gait/Walking Locomotion Goal 1, PT-IRF) modified independence  -RG     Time Frame (Gait/Walking Locomotion Goal 1, PT-IRF) by discharge  -RG               User Key  (r) = Recorded By, (t) = Taken By, (c) = Cosigned By      Initials Name Provider Type    RG Scott Bhatia, MAKENZIE Physical Therapist Assistant                  Wound 07/29/25 1842 Right anterior greater trochanter Surgical (Active)   Dressing Appearance intact 07/31/25 1920   Closure Steri strips 08/01/25 0504   Base clean;dry;pink 08/01/25 0504   Drainage Characteristics/Odor brown 08/01/25 0504   Drainage Amount scant 08/01/25 0504   Care, Wound cleansed with;sterile normal saline 08/01/25 0504   Dressing Care dressing changed 08/01/25 0504       Wound 07/29/25 1843 Right distal  wrist Surgical (Active)   Base unable to visualize 07/31/25 1920       Wound 07/29/25 1844 Right anterior temporal region (Active)   Dressing Appearance open to air 07/31/25 1920   Closure Approximated 07/31/25 1920   Base clean;dry;scab 07/31/25 1920   Drainage Amount none 07/31/25 1920   Care, Wound other (see comments) 07/31/25 1920   Dressing Care open to air 07/31/25 1920       Wound 07/29/25 1849 Right lateral thigh (Active)   Closure Steri strips 07/31/25 1920   Base clean;dry 07/31/25 1920   Drainage Amount none 07/31/25 1920   Care, Wound other (see comments) 07/31/25 1920     Physical Therapy Education       Title: PT OT SLP Therapies (Done)       Topic: Physical Therapy (Done)       Point: Mobility training (Done)       Learning Progress Summary            Patient Acceptance, E,D, VU,NR by RG at 8/1/2025 1540    Acceptance, E,D, VU,NR by RG at 7/31/2025 1504    Acceptance, E, VU,NR by LB at 7/30/2025 1205                      Point: Home exercise program (Done)       Learning Progress Summary            Patient Acceptance, E,D, VU,NR by RG at 8/1/2025 1540    Acceptance, E,D, VU,NR by RG at 7/31/2025 1504    Acceptance, E, VU,NR by LB at 7/30/2025 1205                      Point: Body mechanics (Done)       Learning Progress Summary            Patient Acceptance, E,D, VU,NR by RG at 8/1/2025 1540    Acceptance, E,D, VU,NR by RG at 7/31/2025 1504    Acceptance, E, VU,NR by LB at 7/30/2025 1205                      Point: Precautions (Done)       Learning Progress Summary            Patient Acceptance, E,D, VU,NR by RG at 8/1/2025 1540    Acceptance, E,D, VU,NR by RG at 7/31/2025 1504    Acceptance, E, VU,NR by LB at 7/30/2025 1205                                      User Key       Initials Effective Dates Name Provider Type Discipline    LB 06/16/21 -  Nicolette Pierce, PT Physical Therapist PT    RG 06/16/21 -  Scott Bhatia PTA Physical Therapist Assistant PT                    PT Recommendation  and Plan    Frequency of Treatment (PT): 5 times per week  Anticipated Equipment Needs at Discharge (PT Eval):  (tbd)                  Time Calculation:      PT Charges       Row Name 08/01/25 1541             Time Calculation    Start Time 1330  -RG      Stop Time 1415  -RG      Time Calculation (min) 45 min  -RG      PT Received On 08/01/25  -RG         Time Calculation- PT    Total Timed Code Minutes- PT 45 minute(s)  -RG                User Key  (r) = Recorded By, (t) = Taken By, (c) = Cosigned By      Initials Name Provider Type    Scott Hahn PTA Physical Therapist Assistant                    Therapy Charges for Today       Code Description Service Date Service Provider Modifiers Qty    17499220704 HC GAIT TRAINING EA 15 MIN 7/31/2025 Scott Bhatia PTA GP, CQ 1    03604981292 HC PT THERAPEUTIC ACT EA 15 MIN 7/31/2025 Scott Bhatia PTA GP, CQ 2    10619541529 HC PT THER PROC EA 15 MIN 7/31/2025 Scott Bhatia PTA GP, CQ 3    17965552006 HC PT THERAPEUTIC ACT EA 15 MIN 8/1/2025 Scott Bhatia PTA GP, CQ 2    67413416950 HC PT THER PROC EA 15 MIN 8/1/2025 Scott Bhatia PTA GP, CQ 1              PT G-Codes  AM-PAC 6 Clicks Score (PT): 16      Scott Bhatia PTA  8/1/2025

## 2025-08-01 NOTE — PLAN OF CARE
Goal Outcome Evaluation:  Plan of Care Reviewed With: patient        Progress: improving  Outcome Summary: BLUE SAFETY BAND NOTED; C COLLAR NOTED; BRACE TO RIGHT DISTAL FOREARM/WRIST AREA NOTED; FALL SAFETY INTERVENTION LEVELS EDUCATION GIVEN; PROGRESSING WITH CURRENT THERAPIES; CONTINUE PLAN OF CARE; MONITOR

## 2025-08-02 LAB
ANION GAP SERPL CALCULATED.3IONS-SCNC: 15.3 MMOL/L (ref 5–15)
BASOPHILS # BLD AUTO: 0.04 10*3/MM3 (ref 0–0.2)
BASOPHILS NFR BLD AUTO: 0.5 % (ref 0–1.5)
BUN SERPL-MCNC: 12.7 MG/DL (ref 6–20)
BUN/CREAT SERPL: 20.5 (ref 7–25)
CALCIUM SPEC-SCNC: 8.4 MG/DL (ref 8.6–10.5)
CHLORIDE SERPL-SCNC: 93 MMOL/L (ref 98–107)
CO2 SERPL-SCNC: 24.7 MMOL/L (ref 22–29)
CREAT SERPL-MCNC: 0.62 MG/DL (ref 0.57–1)
DEPRECATED RDW RBC AUTO: 51.3 FL (ref 37–54)
EGFRCR SERPLBLD CKD-EPI 2021: 112.8 ML/MIN/1.73
EOSINOPHIL # BLD AUTO: 0.07 10*3/MM3 (ref 0–0.4)
EOSINOPHIL NFR BLD AUTO: 1 % (ref 0.3–6.2)
ERYTHROCYTE [DISTWIDTH] IN BLOOD BY AUTOMATED COUNT: 15.7 % (ref 12.3–15.4)
GLUCOSE SERPL-MCNC: 104 MG/DL (ref 65–99)
HCT VFR BLD AUTO: 31.6 % (ref 34–46.6)
HGB BLD-MCNC: 9.9 G/DL (ref 12–15.9)
IMM GRANULOCYTES # BLD AUTO: 0.04 10*3/MM3 (ref 0–0.05)
IMM GRANULOCYTES NFR BLD AUTO: 0.5 % (ref 0–0.5)
LYMPHOCYTES # BLD AUTO: 1.95 10*3/MM3 (ref 0.7–3.1)
LYMPHOCYTES NFR BLD AUTO: 26.6 % (ref 19.6–45.3)
MCH RBC QN AUTO: 28.8 PG (ref 26.6–33)
MCHC RBC AUTO-ENTMCNC: 31.3 G/DL (ref 31.5–35.7)
MCV RBC AUTO: 91.9 FL (ref 79–97)
MONOCYTES # BLD AUTO: 0.7 10*3/MM3 (ref 0.1–0.9)
MONOCYTES NFR BLD AUTO: 9.6 % (ref 5–12)
NEUTROPHILS NFR BLD AUTO: 4.52 10*3/MM3 (ref 1.7–7)
NEUTROPHILS NFR BLD AUTO: 61.8 % (ref 42.7–76)
NRBC BLD AUTO-RTO: 0 /100 WBC (ref 0–0.2)
PLATELET # BLD AUTO: 600 10*3/MM3 (ref 140–450)
PMV BLD AUTO: 9.3 FL (ref 6–12)
POTASSIUM SERPL-SCNC: 3.8 MMOL/L (ref 3.5–5.2)
RBC # BLD AUTO: 3.44 10*6/MM3 (ref 3.77–5.28)
SODIUM SERPL-SCNC: 133 MMOL/L (ref 136–145)
WBC NRBC COR # BLD AUTO: 7.32 10*3/MM3 (ref 3.4–10.8)

## 2025-08-02 PROCEDURE — 97530 THERAPEUTIC ACTIVITIES: CPT

## 2025-08-02 PROCEDURE — 85025 COMPLETE CBC W/AUTO DIFF WBC: CPT | Performed by: INTERNAL MEDICINE

## 2025-08-02 PROCEDURE — 25010000002 HEPARIN (PORCINE) PER 1000 UNITS: Performed by: INTERNAL MEDICINE

## 2025-08-02 PROCEDURE — 94760 N-INVAS EAR/PLS OXIMETRY 1: CPT

## 2025-08-02 PROCEDURE — 97110 THERAPEUTIC EXERCISES: CPT

## 2025-08-02 PROCEDURE — 80048 BASIC METABOLIC PNL TOTAL CA: CPT | Performed by: INTERNAL MEDICINE

## 2025-08-02 PROCEDURE — 97535 SELF CARE MNGMENT TRAINING: CPT

## 2025-08-02 PROCEDURE — 94799 UNLISTED PULMONARY SVC/PX: CPT

## 2025-08-02 PROCEDURE — 99231 SBSQ HOSP IP/OBS SF/LOW 25: CPT | Performed by: INTERNAL MEDICINE

## 2025-08-02 PROCEDURE — 97116 GAIT TRAINING THERAPY: CPT

## 2025-08-02 PROCEDURE — 94664 DEMO&/EVAL PT USE INHALER: CPT

## 2025-08-02 RX ORDER — MODAFINIL 100 MG/1
200 TABLET ORAL DAILY
Status: DISPENSED | OUTPATIENT
Start: 2025-08-03 | End: 2025-08-06

## 2025-08-02 RX ADMIN — Medication 10 MG: at 20:59

## 2025-08-02 RX ADMIN — MELOXICAM 7.5 MG: 7.5 TABLET ORAL at 08:22

## 2025-08-02 RX ADMIN — DULOXETINE 60 MG: 60 CAPSULE, DELAYED RELEASE ORAL at 20:59

## 2025-08-02 RX ADMIN — BUDESONIDE AND FORMOTEROL FUMARATE DIHYDRATE 2 PUFF: 160; 4.5 AEROSOL RESPIRATORY (INHALATION) at 19:07

## 2025-08-02 RX ADMIN — DULOXETINE 60 MG: 60 CAPSULE, DELAYED RELEASE ORAL at 08:22

## 2025-08-02 RX ADMIN — HEPARIN SODIUM 5000 UNITS: 5000 INJECTION INTRAVENOUS; SUBCUTANEOUS at 21:03

## 2025-08-02 RX ADMIN — BUSPIRONE HYDROCHLORIDE 10 MG: 10 TABLET ORAL at 21:00

## 2025-08-02 RX ADMIN — DOCUSATE SODIUM AND SENNOSIDES 2 TABLET: 8.6; 5 TABLET, FILM COATED ORAL at 08:21

## 2025-08-02 RX ADMIN — METHOCARBAMOL TABLETS 500 MG: 500 TABLET, COATED ORAL at 20:59

## 2025-08-02 RX ADMIN — HYDROXYZINE HYDROCHLORIDE 25 MG: 25 TABLET, FILM COATED ORAL at 20:59

## 2025-08-02 RX ADMIN — BUDESONIDE AND FORMOTEROL FUMARATE DIHYDRATE 2 PUFF: 160; 4.5 AEROSOL RESPIRATORY (INHALATION) at 08:34

## 2025-08-02 RX ADMIN — HYDROCHLOROTHIAZIDE 25 MG: 25 TABLET ORAL at 08:24

## 2025-08-02 RX ADMIN — GABAPENTIN 800 MG: 400 CAPSULE ORAL at 05:21

## 2025-08-02 RX ADMIN — VERAPAMIL HYDROCHLORIDE 120 MG: 120 TABLET, FILM COATED, EXTENDED RELEASE ORAL at 08:23

## 2025-08-02 RX ADMIN — HEPARIN SODIUM 5000 UNITS: 5000 INJECTION INTRAVENOUS; SUBCUTANEOUS at 05:21

## 2025-08-02 RX ADMIN — MODAFINIL 200 MG: 100 TABLET ORAL at 08:23

## 2025-08-02 RX ADMIN — OXYCODONE HYDROCHLORIDE 10 MG: 10 TABLET ORAL at 14:49

## 2025-08-02 RX ADMIN — Medication 1000 MCG: at 08:23

## 2025-08-02 RX ADMIN — OXYCODONE HYDROCHLORIDE 10 MG: 10 TABLET ORAL at 20:58

## 2025-08-02 RX ADMIN — BUSPIRONE HYDROCHLORIDE 10 MG: 10 TABLET ORAL at 08:23

## 2025-08-02 RX ADMIN — ACETAMINOPHEN 1000 MG: 500 TABLET, FILM COATED ORAL at 05:20

## 2025-08-02 RX ADMIN — OXYCODONE HYDROCHLORIDE 10 MG: 10 TABLET ORAL at 08:20

## 2025-08-02 RX ADMIN — HEPARIN SODIUM 5000 UNITS: 5000 INJECTION INTRAVENOUS; SUBCUTANEOUS at 14:50

## 2025-08-02 RX ADMIN — GABAPENTIN 800 MG: 400 CAPSULE ORAL at 21:00

## 2025-08-02 RX ADMIN — FLUOXETINE HYDROCHLORIDE 20 MG: 20 CAPSULE ORAL at 08:21

## 2025-08-02 RX ADMIN — CARIPRAZINE 6 MG: 3 CAPSULE, GELATIN COATED ORAL at 08:24

## 2025-08-02 RX ADMIN — GABAPENTIN 800 MG: 400 CAPSULE ORAL at 14:49

## 2025-08-02 NOTE — PLAN OF CARE
Problem: Rehabilitation (IRF) Plan of Care  Goal: Absence of New-Onset Illness or Injury  Intervention: Prevent Skin Injury  Recent Flowsheet Documentation  Taken 8/2/2025 0715 by Madi Irby, RN  Device Skin Pressure Protection: pressure points protected  Skin Protection: incontinence pads utilized     Problem: Rehabilitation (IRF) Plan of Care  Goal: Absence of New-Onset Illness or Injury  Intervention: Prevent VTE (Venous Thromboembolism)  Recent Flowsheet Documentation  Taken 8/2/2025 0715 by Madi Irby, RN  VTE Prevention/Management: (Heparin SQ) other (see comments)     Problem: Fall Injury Risk  Goal: Absence of Fall and Fall-Related Injury  Outcome: Progressing  Intervention: Identify and Manage Contributors  Recent Flowsheet Documentation  Taken 8/2/2025 0715 by Madi Irby, RN  Medication Review/Management: medications reviewed  Intervention: Promote Injury-Free Environment  Recent Flowsheet Documentation  Taken 8/2/2025 1800 by Madi Irby, RN  Safety Promotion/Fall Prevention:   safety round/check completed   fall prevention program maintained  Taken 8/2/2025 1600 by Madi Irby, RN  Safety Promotion/Fall Prevention:   safety round/check completed   fall prevention program maintained  Taken 8/2/2025 1400 by Madi Irby, RN  Safety Promotion/Fall Prevention:   safety round/check completed   fall prevention program maintained  Taken 8/2/2025 1200 by Madi Irby, RN  Safety Promotion/Fall Prevention:   safety round/check completed   fall prevention program maintained  Taken 8/2/2025 1000 by Madi Irby, RN  Safety Promotion/Fall Prevention:   safety round/check completed   fall prevention program maintained  Taken 8/2/2025 0715 by Madi Irby, RN  Safety Promotion/Fall Prevention:   safety round/check completed   room organization consistent   nonskid shoes/slippers when out of bed   gait belt   fall prevention program maintained   clutter free environment  maintained   assistive device/personal items within reach   Goal Outcome Evaluation:  Plan of Care Reviewed With: patient        Progress: improving

## 2025-08-02 NOTE — PROGRESS NOTES
Assisted By: OT    CC: Follow-up on multiple injuries from trauma. Injuries included right scalp avulsion and repair, right proximal femur fracture, right distal radius fracture, C2 vertebral body fracture.     Interview History/HPI: Patient states she doing well, still having some pain especially in the right leg but tolerable at this time, she is participating with her therapy sessions, no new events overnight.          Current Hospital Meds:  budesonide-formoterol, 2 puff, Inhalation, BID - RT   And  revefenacin, 175 mcg, Nebulization, Daily - RT  busPIRone, 10 mg, Oral, Q12H  Cariprazine HCl, 6 mg, Oral, Daily  DULoxetine, 60 mg, Oral, Q12H  FLUoxetine, 20 mg, Oral, Daily  gabapentin, 800 mg, Oral, Q8H  heparin (porcine), 5,000 Units, Subcutaneous, Q8H  hydroCHLOROthiazide, 25 mg, Oral, Daily  melatonin, 10 mg, Oral, Nightly  meloxicam, 7.5 mg, Oral, Daily  modafinil, 200 mg, Oral, Daily  polyethylene glycol, 17 g, Oral, Daily  senna-docusate sodium, 2 tablet, Oral, BID  verapamil SR, 120 mg, Oral, Q24H  vitamin B-12, 1,000 mcg, Oral, Daily    Pharmacy Consult - Pharmacy to dose,         Vitals:    08/02/25 0715   BP: 116/69   Pulse: 100   Resp: 18   Temp: 97.9 °F (36.6 °C)   SpO2: 96%         Intake/Output Summary (Last 24 hours) at 8/2/2025 1317  Last data filed at 8/2/2025 1200  Gross per 24 hour   Intake 720 ml   Output 651 ml   Net 69 ml       EXAM: Mood is good, scalp laceration is healing well.  Lungs clear heart regular rate and rhythm c-collar in place she can move the fingers of her right hand but with the brace in place I did not have her squeeze to her full strength, left  strength is good.  She can lift both arms up., she can dorsi plantarflex her ankles, no edema.      Diet: Regular/House; Fluid Consistency: Thin (IDDSI 0)        LABS:     Lab Results (last 48 hours)       Procedure Component Value Units Date/Time    Basic Metabolic Panel [[id number]]  (Abnormal) Collected: 08/02/25 7849     Specimen: Blood Updated: 08/02/25 0644     Glucose 104 mg/dL      BUN 12.7 mg/dL      Creatinine 0.62 mg/dL      Sodium 133 mmol/L      Potassium 3.8 mmol/L      Chloride 93 mmol/L      CO2 24.7 mmol/L      Calcium 8.4 mg/dL      BUN/Creatinine Ratio 20.5     Anion Gap 15.3 mmol/L      eGFR 112.8 mL/min/1.73     Narrative:      GFR Categories in Chronic Kidney Disease (CKD)              GFR Category          GFR (mL/min/1.73)    Interpretation  G1                    90 or greater        Normal or high (1)  G2                    60-89                Mild decrease (1)  G3a                   45-59                Mild to moderate decrease  G3b                   30-44                Moderate to severe decrease  G4                    15-29                Severe decrease  G5                    14 or less           Kidney failure    (1)In the absence of evidence of kidney disease, neither GFR category G1 or G2 fulfill the criteria for CKD.    eGFR calculation 2021 CKD-EPI creatinine equation, which does not include race as a factor    CBC & Differential [[id number]]  (Abnormal) Collected: 08/02/25 0450    Specimen: Blood Updated: 08/02/25 0620    Narrative:      The following orders were created for panel order CBC & Differential.  Procedure                               Abnormality         Status                     ---------                               -----------         ------                     CBC Auto Differential[[id number]]        Abnormal            Final result                 Please view results for these tests on the individual orders.    CBC Auto Differential [[id number]]  (Abnormal) Collected: 08/02/25 0450    Specimen: Blood Updated: 08/02/25 0620     WBC 7.32 10*3/mm3      RBC 3.44 10*6/mm3      Hemoglobin 9.9 g/dL      Hematocrit 31.6 %      MCV 91.9 fL      MCH 28.8 pg      MCHC 31.3 g/dL      RDW 15.7 %      RDW-SD 51.3 fl      MPV 9.3 fL      Platelets 600 10*3/mm3      Neutrophil % 61.8 %       Lymphocyte % 26.6 %      Monocyte % 9.6 %      Eosinophil % 1.0 %      Basophil % 0.5 %      Immature Grans % 0.5 %      Neutrophils, Absolute 4.52 10*3/mm3      Lymphocytes, Absolute 1.95 10*3/mm3      Monocytes, Absolute 0.70 10*3/mm3      Eosinophils, Absolute 0.07 10*3/mm3      Basophils, Absolute 0.04 10*3/mm3      Immature Grans, Absolute 0.04 10*3/mm3      nRBC 0.0 /100 WBC                  Radiology:    Imaging Results (Last 72 Hours)       ** No results found for the last 72 hours. **                Assessment/Plan:   Multiple trauma injuries as delineated above.  C2 vertebral body fracture, right wrist fracture, right femur fracture, scalp laceration status post repair.  Patient is weight-bear as tolerated on the lower extremities, the right upper extremity she cannot bear weight below the elbow but weight-bear as tolerated from the elbow up.  Will continue oxycodone as needed for pain.  Continue as needed Tylenol and Robaxin.  Patient is participating with OT and PT.  With PT, she is min to contact-guard for transfers and she walked 80 then 160 feet x 2 contact-guard to min assist.  With OT today, patient was seen for ADL retraining functional mobility and functional activity tolerance.  Patient required contact-guard with functional transfers with them to wheelchair and bedside commode, she required min assist for toileting.  Patient required frequent rest breaks.     Scalp laceration, Vaseline twice daily, progressing well     Anxiety and depression, adjustments made in her medication per psychiatry.  Psychiatry saw the patient again yesterday, he is continuing the Prozac for 3 days then this is being stopped, he had lowered this to try to avoid any withdrawal symptoms.  Continuing on for her ADHD modafinil,     Asthma, stable, patient quit smoking actually 3 years ago,  Continue as needed albuterol, continue breztri equivalent      Hypertension, on Calan and HCTZ, controlled     Morbid obesity as  evidenced by BMI greater than 40, adversely affecting her overall health and acute rehabilitation.     VTE prophylaxis, continue subcu heparin     Chronic pain syndrome, patient is on gabapentin and Norco at home, Norco has been held as she is on oxycodone, continuing gabapentin     Probable reactive thrombocytosis, improved today.    Normocytic anemia, stable    Emerson Charlton MD

## 2025-08-02 NOTE — THERAPY TREATMENT NOTE
Inpatient Rehabilitation - Occupational Therapy Treatment Note     Nav     Patient Name: Ninoska Stock  : 1980  MRN: [id number]    Today's Date: 2025                 Admit Date: 2025       No diagnosis found.    Patient Active Problem List   Diagnosis    Shortness of breath    Immunization due    Abnormal CT of the chest    Cigarette nicotine dependence without complication    Hypersomnia    Trauma       Past Medical History:   Diagnosis Date    Anxiety     Asthma     Depression     Heart disease     Multiple sclerosis        Past Surgical History:   Procedure Laterality Date    CHOLECYSTECTOMY      FEMUR FRACTURE SURGERY      FOOT SURGERY      HYSTERECTOMY      KIDNEY SURGERY      TUBAL ABDOMINAL LIGATION      WRIST FRACTURE SURGERY               IRF OT ASSESSMENT FLOWSHEET (Last 12 Hours)       IRF OT Evaluation and Treatment       Row Name 25 1030          OT Time and Intention    Document Type daily treatment  -LM     Mode of Treatment occupational therapy  -LM     Total Minutes, Occupational Therapy 90  -LM     Patient Effort good  -LM       Row Name 25 1030          General Information    Existing Precautions/Restrictions fall;cervical collar;lifting;non-weight bearing  -LM     Comment, General Information Patient seen this am for adl retraining, fxl mobility, fxl activity tolerance.  Patient required cga with fxl transfers to w/c and bsc.  patient requires min assist with toileting.  patient participated in UBE and table top fmc/gmc tasks.  Frequent rest breaks.  -LM       Row Name 25 1030          Pain    Pretreatment Pain Rating 4/10  -LM     Posttreatment Pain Rating 4/10  -LM     Pain Location hip  -LM     Pain Side/Orientation right  -LM       Row Name 25 1030          Cognition/Psychosocial    Affect/Mental Status (Cognition) WFL  -LM     Orientation Status (Cognition) oriented x 4  -LM       Row Name 25 1030          Positioning and Restraints     Post Treatment Position bed  -LM     In Bed call light within reach;encouraged to call for assist  -LM               User Key  (r) = Recorded By, (t) = Taken By, (c) = Cosigned By      Initials Name Effective Dates    LM Cynthia Diaz OT 06/16/21 -                      Occupational Therapy Education       Title: PT OT SLP Therapies (Done)       Topic: Occupational Therapy (Done)       Point: ADL training (Done)       Learning Progress Summary            Patient Acceptance, E,D, VU,NR by  at 8/1/2025 1354    Acceptance, E,D, VU,NR by  at 7/31/2025 1459    Acceptance, E,TB,D, VU,DU,NR by LA at 7/31/2025 1223                      Point: Home exercise program (Done)       Learning Progress Summary            Patient Acceptance, E,TB,D, VU,DU,NR by LA at 7/31/2025 1223                      Point: Precautions (Done)       Learning Progress Summary            Patient Acceptance, E,D, VU,NR by  at 8/1/2025 1354    Acceptance, E,D, VU,NR by  at 7/31/2025 1459    Acceptance, E,TB,D, VU,DU,NR by LA at 7/31/2025 1223                      Point: Body mechanics (Done)       Learning Progress Summary            Patient Acceptance, E,TB,D, VU,DU,NR by LA at 7/31/2025 1223                                      User Key       Initials Effective Dates Name Provider Type Discipline     06/16/21 -  Humera Perry OT Occupational Therapist OT    LA 02/14/22 -  Radha Sánchez OT Occupational Therapist OT                        OT Recommendation and Plan                         Time Calculation:       Therapy Charges for Today       Code Description Service Date Service Provider Modifiers Qty    58255598850 HC OT SELF CARE/MGMT/TRAIN EA 15 MIN 8/1/2025 Cynthia Diaz OT GO 1    61740200901 HC OT THER PROC EA 15 MIN 8/1/2025 Cynthia Diaz OT GO 2    47742807061 HC OT SELF CARE/MGMT/TRAIN EA 15 MIN 8/2/2025 Cynthia Diaz OT GO 1    84756406497 HC OT THER PROC EA 15 MIN 8/2/2025 Cynthia Diaz OT GO 4    [id number]  HC OT THERAPEUTIC ACT EA 15 MIN 8/2/2025 Cynthia Diaz, OT GO 1                     Cynthia Diaz OT  8/2/2025

## 2025-08-02 NOTE — PROGRESS NOTES
Problems/Goals  Skin Integrity (Body Function Structure)  Current Status: risk for impaired skin integrity  Long Term Goals  07/29/2025 06:04 PM - Active  no skin breakdown  Potential for Injury (Safety)  Current Status: risk for falls  Short Term Goals  07/29/2025 06:06 PM - Active  no falls  Long Term Goals  07/29/2025 06:05 PM - Active  no falls    Signed by: Madi Irby RN

## 2025-08-02 NOTE — THERAPY TREATMENT NOTE
Inpatient Rehabilitation - Physical Therapy Treatment Note        Nav     Patient Name: Ninoska Stock  : 1980  MRN: [id number]    Today's Date: 2025                    Admit Date: 2025      Visit Dx:   No diagnosis found.    Patient Active Problem List   Diagnosis    Shortness of breath    Immunization due    Abnormal CT of the chest    Cigarette nicotine dependence without complication    Hypersomnia    Trauma       Past Medical History:   Diagnosis Date    Anxiety     Asthma     Depression     Heart disease     Multiple sclerosis        Past Surgical History:   Procedure Laterality Date    CHOLECYSTECTOMY      FEMUR FRACTURE SURGERY      FOOT SURGERY      HYSTERECTOMY      KIDNEY SURGERY      TUBAL ABDOMINAL LIGATION      WRIST FRACTURE SURGERY         PT ASSESSMENT (Last 12 Hours)       IRF PT Evaluation and Treatment       Row Name 25 1509          PT Time and Intention    Document Type daily treatment  -RG     Mode of Treatment individual therapy;physical therapy  -RG     Patient/Family/Caregiver Comments/Observations Pt and nursing in agreement for skilled PT on this date.  -RG       Row Name 25 1509          General Information    Existing Precautions/Restrictions fall;cervical collar;brace on at all times;lifting  -RG       Row Name 25 1509          Pain Scale: FACES Pre/Post-Treatment    Pain: FACES Scale, Pretreatment 4-->hurts little more  -RG     Posttreatment Pain Rating 4-->hurts little more  -RG       Row Name 25 1509          Cognition/Psychosocial    Affect/Mental Status (Cognition) WFL  -RG     Follows Commands (Cognition) WFL  -RG     Personal Safety Interventions fall prevention program maintained;gait belt;nonskid shoes/slippers when out of bed  -RG       Row Name 25 1509          Mobility    Extremity Weight-bearing Status left lower extremity;right lower extremity;left upper extremity;right upper extremity  -RG     Left Upper Extremity  (Weight-bearing Status) weight-bearing as tolerated (WBAT)  -RG     Right Upper Extremity (Weight-bearing Status) non weight-bearing (NWB)  can use a platform walker  -RG     Left Lower Extremity (Weight-bearing Status) weight-bearing as tolerated (WBAT)  -RG     Right Lower Extremity (Weight-bearing Status) weight-bearing as tolerated (WBAT)  -       Row Name 08/02/25 1509          Bed Mobility    Bed Mobility supine-sit;sit-supine;supine-sit-supine  -       Row Name 08/02/25 1502          Transfer Assessment/Treatment    Transfers bed-chair transfer;chair-bed transfer;sit-stand transfer;stand-sit transfer;stand pivot/stand step transfer;toilet transfer  -       Row Name 08/02/25 1505          Chair-Bed Transfer    Chair-Bed Jerome (Transfers) verbal cues;nonverbal cues (demo/gesture);contact guard  -       Row Name 08/02/25 1509          Sit-Stand Transfer    Sit-Stand Jerome (Transfers) contact guard;minimum assist (75% patient effort);verbal cues;nonverbal cues (demo/gesture)  -     Assistive Device (Sit-Stand Transfers) wheelchair;walker, rolling platform  -       Row Name 08/02/25 1509          Stand-Sit Transfer    Stand-Sit [place] (Transfers) contact guard;minimum assist (75% patient effort);verbal cues;nonverbal cues (demo/gesture)  -     Assistive Device (Stand-Sit Transfers) wheelchair;walker, rolling platform  -       Row Name 08/02/25 1509          Stand Pivot/Stand Step Transfer    Stand Pivot/Stand Step Jerome (Transfers) verbal cues;nonverbal cues (demo/gesture);contact guard;minimum assist (75% patient effort)  -     Assistive Device (Stand Pivot Stand Step Transfer) --  bed rail  -       Row Name 08/02/25 1503          Toilet Transfer    Type (Toilet Transfer) stand pivot/stand step  -     [place] Level (Toilet Transfer) verbal cues;nonverbal cues (demo/gesture);supervision  -       Row Name 08/02/25 1507          Gait/Stairs (Locomotion)     [place] Level (Gait) minimum assist (75% patient effort);1 person to manage equipment;verbal cues;nonverbal cues (demo/gesture)  -RG     Assistive Device (Gait) walker, rolling platform  -RG     Patient was able to Ambulate yes  -RG     Distance in Feet (Gait) 160  -RG     Pattern (Gait) step-to  -RG     Deviations/Abnormal Patterns (Gait) magan decreased;gait speed decreased;stride length decreased  -RG     Bilateral Gait Deviations weight shift ability decreased  -RG       Row Name 08/02/25 1509          Safety Issues/Impairments Affecting Functional Mobility    Impairments Affecting Function (Mobility) balance;endurance/activity tolerance;pain;strength  -RG       Row Name 08/02/25 1509          Hip (Therapeutic Exercise)    Hip Strengthening (Therapeutic Exercise) bilateral;flexion;aBduction;aDduction;external rotation;internal rotation;heel slides;marching while seated;sitting;supine;2 lb free weight;resistance band;green;10 repetitions;2 sets  -RG       Row Name 08/02/25 1509          Knee (Therapeutic Exercise)    Knee Strengthening (Therapeutic Exercise) bilateral;flexion;extension;heel slides;marching while seated;LAQ (long arc quad);hamstring curls;sitting;supine;2 lb free weight;resistance band;green;10 repetitions;2 sets  -RG       Row Name 08/02/25 1509          Ankle (Therapeutic Exercise)    Ankle Strengthening (Therapeutic Exercise) bilateral;dorsiflexion;plantarflexion;sitting;supine;10 repetitions;2 sets  -RG       Row Name 08/02/25 1504          Positioning and Restraints    Pre-Treatment Position sitting in chair/recliner  -RG     Post Treatment Position bed  -RG     In Bed notified nsg;supine;sitting;call light within reach;encouraged to call for assist  -RG       Row Name 08/02/25 1506          Therapy Assessment/Plan (PT)    Patient's Goals For Discharge return home  -RG       Row Name 08/02/25 1500          Therapy Assessment/Plan (PT)    Rehab Potential/Prognosis (PT) good  -RG      Frequency of Treatment (PT) 5 times per week  -RG     Estimated Duration of Therapy (PT) 2 weeks  -RG     Problem List (PT) balance;mobility;strength;pain  <tolerance to activity/endurance  -RG     Activity Limitations Related to Problem List (PT) unable to ambulate safely;unable to transfer safely  -       Row Name 08/02/25 1506          Therapy Plan Review/Discharge Plan (PT)    Anticipated Equipment Needs at Discharge (PT Eval) --  tbd  -RG     Anticipated Discharge Disposition (PT) home with outpatient therapy services  -       Row Name 08/02/25 1509          IRF PT Goals    Bed Mobility Goal Selection (PT-IRF) bed mobility, PT goal 1  -RG     Transfer Goal Selection (PT-IRF) transfers, PT goal 1  -RG     Gait (Walking Locomotion) Goal Selection (PT-IRF) gait, PT goal 1  -RG       Row Name 08/02/25 1508          Bed Mobility Goal 1 (PT-IRF)    Activity/Assistive Device (Bed Mobility Goal 1, PT-IRF) sit to supine/supine to sit  -RG     [place] Level (Bed Mobility Goal 1, PT-IRF) modified independence  -RG     Time Frame (Bed Mobility Goal 1, PT-IRF) by discharge  -RG       Row Name 08/02/25 2906          Transfer Goal 1 (PT-IRF)    Activity/Assistive Device (Transfer Goal 1, PT-IRF) sit-to-stand/stand-to-sit;bed-to-chair/chair-to-bed  -RG     [place] Level (Transfer Goal 1, PT-IRF) modified independence  -RG     Time Frame (Transfer Goal 1, PT-IRF) by discharge  -RG       Row Name 08/02/25 8282          Gait/Walking Locomotion Goal 1 (PT-IRF)    Activity/Assistive Device (Gait/Walking Locomotion Goal 1, PT-IRF) walker, rolling platform  -RG     Gait/Walking Locomotion Distance Goal 1 (PT-IRF) 300'  -RG     [place] Level (Gait/Walking Locomotion Goal 1, PT-IRF) modified independence  -RG     Time Frame (Gait/Walking Locomotion Goal 1, PT-IRF) by discharge  -RG               User Key  (r) = Recorded By, (t) = Taken By, (c) = Cosigned By      Initials Name Provider Type    RG Scott Bhatia, PTA  Physical Therapist Assistant                  Wound 07/29/25 1842 Right anterior greater trochanter Surgical (Active)   Dressing Appearance intact;dry 08/02/25 0715   Closure Unable to assess 08/02/25 0715   Base unable to visualize 08/02/25 0715   Drainage Amount none 08/02/25 0715       Wound 07/29/25 1843 Right distal wrist Surgical (Active)   Dressing Appearance dry;intact 08/02/25 0715   Base unable to visualize 08/02/25 0715   Drainage Amount none 08/02/25 0715       Wound 07/29/25 1844 Right anterior temporal region (Active)   Dressing Appearance open to air 08/02/25 0715   Closure Approximated 08/02/25 0715   Base clean;dry;scab 08/02/25 0715   Periwound pink 08/02/25 0715   Periwound Temperature warm 08/02/25 0715   Periwound Skin Turgor soft 08/02/25 0715   Drainage Amount none 08/02/25 0715   Care, Wound other (see comments) 08/01/25 1925   Dressing Care open to air 08/02/25 0715       Wound 07/29/25 1849 Right lateral thigh (Active)   Dressing Appearance open to air 08/02/25 0715   Closure Steri strips 08/01/25 1925   Base clean;dry 08/01/25 1925   Periwound dry;pink 08/02/25 0715   Periwound Skin Turgor soft 08/02/25 0715   Drainage Amount none 08/02/25 0715   Care, Wound other (see comments) 08/01/25 1925   Dressing Care open to air 08/02/25 0715     Physical Therapy Education       Title: PT OT SLP Therapies (Done)       Topic: Physical Therapy (Done)       Point: Mobility training (Done)       Learning Progress Summary            Patient Acceptance, E,D, VU,NR by RG at 8/2/2025 1514    Acceptance, E, VU,DU by  at 8/1/2025 1558    Acceptance, E,D, VU,NR by RG at 8/1/2025 1540    Acceptance, E,D, VU,NR by RG at 7/31/2025 1504    Acceptance, E, VU,NR by LB at 7/30/2025 1205                      Point: Home exercise program (Done)       Learning Progress Summary            Patient Acceptance, E,D, VU,NR by RG at 8/2/2025 1514    Acceptance, FELIX RIVERADU by  at 8/1/2025 1558    Acceptance, GEOVANNA RIVERA VU,NR by  RG at 8/1/2025 1540    Acceptance, E,D, VU,NR by RG at 7/31/2025 1504    Acceptance, E, VU,NR by LB at 7/30/2025 1205                      Point: Body mechanics (Done)       Learning Progress Summary            Patient Acceptance, E,D, VU,NR by RG at 8/2/2025 1514    Acceptance, E, VU,DU by  at 8/1/2025 1558    Acceptance, E,D, VU,NR by RG at 8/1/2025 1540    Acceptance, E,D, VU,NR by RG at 7/31/2025 1504    Acceptance, E, VU,NR by LB at 7/30/2025 1205                      Point: Precautions (Done)       Learning Progress Summary            Patient Acceptance, E,D, VU,NR by RG at 8/2/2025 1514    Acceptance, E, VU,DU by  at 8/1/2025 1558    Acceptance, E,D, VU,NR by RG at 8/1/2025 1540    Acceptance, E,D, VU,NR by RG at 7/31/2025 1504    Acceptance, E, VU,NR by LB at 7/30/2025 1205                                      User Key       Initials Effective Dates Name Provider Type Discipline     06/16/21 -  Nicolette Pierce, PT Physical Therapist PT     06/16/21 -  Scott Bhatia PTA Physical Therapist Assistant PT     01/20/23 -  Maryam Wills PTA Physical Therapist Assistant PT                    PT Recommendation and Plan    Frequency of Treatment (PT): 5 times per week  Anticipated Equipment Needs at Discharge (PT Eval):  (tbd)                  Time Calculation:      PT Charges       Row Name 08/02/25 1516 08/02/25 1515          Time Calculation    Start Time 1245  -RG 1045  -RG     Stop Time 1330  -RG 1130  -RG     Time Calculation (min) 45 min  -RG 45 min  -RG     PT Received On 08/02/25  -RG 08/02/25  -RG        Time Calculation- PT    Total Timed Code Minutes- PT 45 minute(s)  -RG 45 minute(s)  -               User Key  (r) = Recorded By, (t) = Taken By, (c) = Cosigned By      Initials Name Provider Type    RG Scott Bhatia, MAKENZIE Physical Therapist Assistant                    Therapy Charges for Today       Code Description Service Date Service Provider Modifiers Qty    12000447389   PT THERAPEUTIC ACT EA 15 MIN 8/1/2025 Scott Bhatia PTA GP, CQ 2    77966989820 HC PT THER PROC EA 15 MIN 8/1/2025 Scott Bhatia, MAKENZIE GP, CQ 1    69052099510 HC GAIT TRAINING EA 15 MIN 8/2/2025 Scott Bhatia, MAKENZIE GP, CQ 1    11855807916 HC PT THERAPEUTIC ACT EA 15 MIN 8/2/2025 Scott Bhatia PTA GP, CQ 2    53404732346 HC PT THER PROC EA 15 MIN 8/2/2025 Scott Bhatia PTA GP, CQ 3              PT G-Codes  AM-PAC 6 Clicks Score (PT): 17      Scott Bhatia PTA  8/2/2025

## 2025-08-03 PROCEDURE — 94664 DEMO&/EVAL PT USE INHALER: CPT

## 2025-08-03 PROCEDURE — 25010000002 HEPARIN (PORCINE) PER 1000 UNITS: Performed by: INTERNAL MEDICINE

## 2025-08-03 PROCEDURE — 94799 UNLISTED PULMONARY SVC/PX: CPT

## 2025-08-03 PROCEDURE — 94760 N-INVAS EAR/PLS OXIMETRY 1: CPT

## 2025-08-03 RX ADMIN — HEPARIN SODIUM 5000 UNITS: 5000 INJECTION INTRAVENOUS; SUBCUTANEOUS at 05:43

## 2025-08-03 RX ADMIN — Medication 1000 MCG: at 09:03

## 2025-08-03 RX ADMIN — OXYCODONE HYDROCHLORIDE 10 MG: 10 TABLET ORAL at 12:30

## 2025-08-03 RX ADMIN — HEPARIN SODIUM 5000 UNITS: 5000 INJECTION INTRAVENOUS; SUBCUTANEOUS at 13:31

## 2025-08-03 RX ADMIN — BUDESONIDE AND FORMOTEROL FUMARATE DIHYDRATE 2 PUFF: 160; 4.5 AEROSOL RESPIRATORY (INHALATION) at 08:15

## 2025-08-03 RX ADMIN — BUSPIRONE HYDROCHLORIDE 10 MG: 10 TABLET ORAL at 21:19

## 2025-08-03 RX ADMIN — FLUOXETINE HYDROCHLORIDE 20 MG: 20 CAPSULE ORAL at 09:04

## 2025-08-03 RX ADMIN — Medication 10 MG: at 21:19

## 2025-08-03 RX ADMIN — OXYCODONE HYDROCHLORIDE 10 MG: 10 TABLET ORAL at 21:19

## 2025-08-03 RX ADMIN — METHOCARBAMOL TABLETS 500 MG: 500 TABLET, COATED ORAL at 21:19

## 2025-08-03 RX ADMIN — ACETAMINOPHEN 1000 MG: 500 TABLET, FILM COATED ORAL at 17:42

## 2025-08-03 RX ADMIN — HYDROCHLOROTHIAZIDE 25 MG: 25 TABLET ORAL at 09:03

## 2025-08-03 RX ADMIN — MELOXICAM 7.5 MG: 7.5 TABLET ORAL at 09:04

## 2025-08-03 RX ADMIN — GABAPENTIN 800 MG: 400 CAPSULE ORAL at 05:43

## 2025-08-03 RX ADMIN — BUDESONIDE AND FORMOTEROL FUMARATE DIHYDRATE 2 PUFF: 160; 4.5 AEROSOL RESPIRATORY (INHALATION) at 19:56

## 2025-08-03 RX ADMIN — CARIPRAZINE 6 MG: 3 CAPSULE, GELATIN COATED ORAL at 09:05

## 2025-08-03 RX ADMIN — GABAPENTIN 800 MG: 400 CAPSULE ORAL at 13:31

## 2025-08-03 RX ADMIN — VERAPAMIL HYDROCHLORIDE 120 MG: 120 TABLET, FILM COATED, EXTENDED RELEASE ORAL at 09:05

## 2025-08-03 RX ADMIN — OXYCODONE HYDROCHLORIDE 10 MG: 10 TABLET ORAL at 05:43

## 2025-08-03 RX ADMIN — BUSPIRONE HYDROCHLORIDE 10 MG: 10 TABLET ORAL at 09:03

## 2025-08-03 RX ADMIN — DULOXETINE 60 MG: 60 CAPSULE, DELAYED RELEASE ORAL at 21:19

## 2025-08-03 RX ADMIN — GABAPENTIN 800 MG: 400 CAPSULE ORAL at 21:19

## 2025-08-03 RX ADMIN — MODAFINIL 200 MG: 100 TABLET ORAL at 09:04

## 2025-08-03 RX ADMIN — HYDROXYZINE HYDROCHLORIDE 25 MG: 25 TABLET, FILM COATED ORAL at 21:19

## 2025-08-03 RX ADMIN — HEPARIN SODIUM 5000 UNITS: 5000 INJECTION INTRAVENOUS; SUBCUTANEOUS at 21:19

## 2025-08-03 RX ADMIN — DULOXETINE 60 MG: 60 CAPSULE, DELAYED RELEASE ORAL at 09:03

## 2025-08-03 NOTE — PLAN OF CARE
Goal Outcome Evaluation:           Problem: Rehabilitation (IRF) Plan of Care  Goal: Plan of Care Review  Outcome: Progressing  Goal: Patient-Specific Goal (Individualized)  Outcome: Progressing  Goal: Absence of New-Onset Illness or Injury  Outcome: Progressing  Intervention: Identify and Manage Fall Risk  Description: Perform standard risk assessment on admission using a validated tool or comprehensive approach appropriate to the patient; reassess fall risk frequently, with change in status or transfer to another level of care.Communicate risk to interprofessional healthcare team; ensure fall risk visible cue.Determine need for increased observation, equipment and environmental modification, as well as use of supportive, nonskid footwear.Adjust safety measures to individual needs and identified risk factors.Reinforce the importance of active participation with fall risk prevention, safety and physical activity with the patient and family.Perform regular intentional rounding to assess need for position change, pain management, attention to personal needs and assistance with toileting.  Recent Flowsheet Documentation  Taken 8/2/2025 2000 by Mee Guadarrama RN  Safety Promotion/Fall Prevention: safety round/check completed  Taken 8/2/2025 1915 by Mee Guadarrama RN  Safety Promotion/Fall Prevention: safety round/check completed  Intervention: Prevent VTE (Venous Thromboembolism)  Description: Provide ongoing assessment for signs and symptoms of VTE.Encourage and assist with early and ongoing mobilization.Initiate and maintain compression therapy when indicated.Recognize the patient's individual risk for bleeding before initiating pharmacologic thromboprophylaxis.Provide prophylactic anticoagulation when prescribed.  Recent Flowsheet Documentation  Taken 8/2/2025 1915 by Mee Guadarrama RN  VTE Prevention/Management: (See MAR) other (see comments)  Goal: Optimal Comfort and Wellbeing  Outcome:  Progressing  Intervention: Provide Person-Centered Care  Description: Use a family-focused approach to care; incorporate family/significant others in assessment, planning, education and support.Develop trust and rapport by proactively providing information, encouraging questions, addressing concerns and offering reassurance.Acknowledge emotional response; convey safety and acceptance.Recognize and utilize personal coping strategies and strengths; develop goals via shared decision-making.Identify patient's preferred routines, habits and personal preferences; respect privacy and personal space.Recognize progress and patient effort to facilitate motivation; provide opportunities for success.Honor spiritual and cultural preferences.Screen and monitor ongoing safety risks, such as self-harm, violence and elopement.  Recent Flowsheet Documentation  Taken 8/2/2025 1915 by Mee Guadarrama RN  Trust Relationship/Rapport:   care explained   choices provided   questions answered   reassurance provided   thoughts/feelings acknowledged  Goal: Home and Community Transition Plan Established  Outcome: Progressing     Problem: Skin Injury Risk Increased  Goal: Skin Health and Integrity  Outcome: Progressing  Intervention: Optimize Skin Protection  Description: Perform a full pressure injury risk assessment, as indicated by screening, upon admission to care unit.Reassess skin (full inspection and injury risk, including skin temperature, consistency and color) frequently (e.g., scheduled interval, with change in condition) to provide optimal early detection and prevention.Maintain adequate tissue perfusion (e.g., encourage fluid balance; avoid crossing legs, constrictive clothing or devices) to promote tissue oxygenation.Maintain head of bed at lowest degree of elevation tolerated, considering medical condition and other restrictions. Use positioning supports to prevent sliding and friction. Consider low friction textiles.Avoid  positioning onto an area that remains reddened or on bony prominences.Minimize incontinence and moisture (e.g., toileting schedule; moisture-wicking pad, diaper or incontinence collection device; skin moisture barrier).Cleanse skin promptly and gently, when soiled, utilizing a pH-balanced cleanser.Relieve and redistribute pressure (e.g., scheduled position changes, weight shifts, use of support surface, medical device repositioning, protective dressing application, use of positioning device, microclimate control, use of pressure-injury-monitorEncourage increased activity, such as sitting in a chair at the bedside or early mobilization, when able to tolerate. Avoid prolonged sitting.  Recent Flowsheet Documentation  Taken 8/2/2025 1915 by Mee Guadarrama RN  Pressure Reduction Techniques:   weight shift assistance provided   frequent weight shift encouraged   heels elevated off bed  Pressure Reduction Devices: pressure-redistributing mattress utilized     Problem: Fall Injury Risk  Goal: Absence of Fall and Fall-Related Injury  Outcome: Progressing  Intervention: Identify and Manage Contributors  Description: Develop a fall prevention plan, considering patient-centered interventions and family/caregiver involvement; identify and address patient's facilitators and barriers.Provide reorientation, appropriate sensory stimulation and routines with changes in mental status to decrease risk of fall.Promote use of personal vision and auditory aids.Assess assistance level required for safe and effective self-care; provide support as needed, such as toileting and mobilization. For age 65 and older, implement timed toileting with assistance.Encourage physical activity, such as performance of mobility and self-care at highest level of patient ability, multicomponent exercise program and provision of appropriate assistive devices.If fall occurs, assess the severity of injury; implement fall injury protocol. Determine the cause  and revise fall injury prevention plan.Regularly review and advocate for medication adjustment to decrease fall risk; consider administration times, polypharmacy and age.Balance adequate pain management with potential for oversedation.  Recent Flowsheet Documentation  Taken 8/2/2025 2000 by Mee Guadarrama RN  Medication Review/Management: medications reviewed  Taken 8/2/2025 1915 by Mee Guadarrama RN  Medication Review/Management: medications reviewed  Intervention: Promote Injury-Free Environment  Description: Provide a safe, barrier-free environment that encourages independent activity.Keep care area uncluttered and well-lighted.Determine need for increased observation or monitoring.Avoid use of devices that minimize mobility, such as restraints or indwelling urinary catheter.  Recent Flowsheet Documentation  Taken 8/2/2025 2000 by Mee Guadarrama RN  Safety Promotion/Fall Prevention: safety round/check completed  Taken 8/2/2025 1915 by Mee Guadarrama RN  Safety Promotion/Fall Prevention: safety round/check completed     Problem: Mobility Impairment  Goal: Optimal Mobility [place] and Safety  Outcome: Progressing     Problem: Comorbidity Management  Goal: Blood Pressure in Desired Range  Outcome: Progressing  Intervention: Maintain Blood Pressure Management  Description: Evaluate adherence to home antihypertensive regimen (e.g., exercise and activity, diet modification, medication).Provide scheduled antihypertensive medication; consider administration time and effects (e.g., avoid giving diuretic prior to bedtime).Monitor response to antihypertensive medication therapy (e.g., blood pressure, electrolyte levels, medication effects).Minimize risk of orthostatic hypotension; encourage caution with position changes, particularly if elderly.  Recent Flowsheet Documentation  Taken 8/2/2025 2000 by Mee Guadarrama RN  Medication Review/Management: medications reviewed  Taken 8/2/2025 1915 by Mee Guadarrama  RN  Medication Review/Management: medications reviewed

## 2025-08-03 NOTE — PLAN OF CARE
Goal Outcome Evaluation:           Progress: no change  Outcome Summary: MONITOR

## 2025-08-04 VITALS
RESPIRATION RATE: 18 BRPM | WEIGHT: 246.7 LBS | OXYGEN SATURATION: 97 % | HEIGHT: 60 IN | SYSTOLIC BLOOD PRESSURE: 144 MMHG | TEMPERATURE: 98.6 F | BODY MASS INDEX: 48.43 KG/M2 | HEART RATE: 113 BPM | DIASTOLIC BLOOD PRESSURE: 94 MMHG

## 2025-08-04 PROCEDURE — 97110 THERAPEUTIC EXERCISES: CPT

## 2025-08-04 PROCEDURE — 97530 THERAPEUTIC ACTIVITIES: CPT

## 2025-08-04 PROCEDURE — 97116 GAIT TRAINING THERAPY: CPT

## 2025-08-04 PROCEDURE — 25010000002 HEPARIN (PORCINE) PER 1000 UNITS: Performed by: INTERNAL MEDICINE

## 2025-08-04 PROCEDURE — 94799 UNLISTED PULMONARY SVC/PX: CPT

## 2025-08-04 PROCEDURE — 97535 SELF CARE MNGMENT TRAINING: CPT

## 2025-08-04 PROCEDURE — 94664 DEMO&/EVAL PT USE INHALER: CPT

## 2025-08-04 PROCEDURE — 99232 SBSQ HOSP IP/OBS MODERATE 35: CPT | Performed by: FAMILY MEDICINE

## 2025-08-04 PROCEDURE — 94760 N-INVAS EAR/PLS OXIMETRY 1: CPT

## 2025-08-04 RX ADMIN — GABAPENTIN 800 MG: 400 CAPSULE ORAL at 05:40

## 2025-08-04 RX ADMIN — GABAPENTIN 800 MG: 400 CAPSULE ORAL at 14:25

## 2025-08-04 RX ADMIN — HEPARIN SODIUM 5000 UNITS: 5000 INJECTION INTRAVENOUS; SUBCUTANEOUS at 05:40

## 2025-08-04 RX ADMIN — DULOXETINE 60 MG: 60 CAPSULE, DELAYED RELEASE ORAL at 08:49

## 2025-08-04 RX ADMIN — OXYCODONE HYDROCHLORIDE 10 MG: 10 TABLET ORAL at 05:41

## 2025-08-04 RX ADMIN — FLUOXETINE HYDROCHLORIDE 20 MG: 20 CAPSULE ORAL at 08:48

## 2025-08-04 RX ADMIN — HEPARIN SODIUM 5000 UNITS: 5000 INJECTION INTRAVENOUS; SUBCUTANEOUS at 14:25

## 2025-08-04 RX ADMIN — Medication 1000 MCG: at 08:48

## 2025-08-04 RX ADMIN — Medication 10 MG: at 21:17

## 2025-08-04 RX ADMIN — CARIPRAZINE 6 MG: 3 CAPSULE, GELATIN COATED ORAL at 08:48

## 2025-08-04 RX ADMIN — OXYCODONE HYDROCHLORIDE 10 MG: 10 TABLET ORAL at 21:23

## 2025-08-04 RX ADMIN — MODAFINIL 200 MG: 100 TABLET ORAL at 08:48

## 2025-08-04 RX ADMIN — GABAPENTIN 800 MG: 400 CAPSULE ORAL at 21:18

## 2025-08-04 RX ADMIN — DOCUSATE SODIUM AND SENNOSIDES 2 TABLET: 8.6; 5 TABLET, FILM COATED ORAL at 08:49

## 2025-08-04 RX ADMIN — POLYETHYLENE GLYCOL 3350 17 G: 17 POWDER, FOR SOLUTION ORAL at 08:49

## 2025-08-04 RX ADMIN — ACETAMINOPHEN 1000 MG: 500 TABLET, FILM COATED ORAL at 11:03

## 2025-08-04 RX ADMIN — OXYCODONE HYDROCHLORIDE 10 MG: 10 TABLET ORAL at 14:33

## 2025-08-04 RX ADMIN — BUDESONIDE AND FORMOTEROL FUMARATE DIHYDRATE 2 PUFF: 160; 4.5 AEROSOL RESPIRATORY (INHALATION) at 07:18

## 2025-08-04 RX ADMIN — METHOCARBAMOL TABLETS 500 MG: 500 TABLET, COATED ORAL at 11:03

## 2025-08-04 RX ADMIN — HYDROCHLOROTHIAZIDE 25 MG: 25 TABLET ORAL at 08:47

## 2025-08-04 RX ADMIN — BUDESONIDE AND FORMOTEROL FUMARATE DIHYDRATE 2 PUFF: 160; 4.5 AEROSOL RESPIRATORY (INHALATION) at 19:24

## 2025-08-04 RX ADMIN — BUSPIRONE HYDROCHLORIDE 10 MG: 10 TABLET ORAL at 21:17

## 2025-08-04 RX ADMIN — DULOXETINE 60 MG: 60 CAPSULE, DELAYED RELEASE ORAL at 21:17

## 2025-08-04 RX ADMIN — BUSPIRONE HYDROCHLORIDE 10 MG: 10 TABLET ORAL at 08:48

## 2025-08-04 RX ADMIN — VERAPAMIL HYDROCHLORIDE 120 MG: 120 TABLET, FILM COATED, EXTENDED RELEASE ORAL at 08:49

## 2025-08-04 RX ADMIN — HEPARIN SODIUM 5000 UNITS: 5000 INJECTION INTRAVENOUS; SUBCUTANEOUS at 21:18

## 2025-08-04 RX ADMIN — MELOXICAM 7.5 MG: 7.5 TABLET ORAL at 08:48

## 2025-08-04 NOTE — PROGRESS NOTES
Problems/Goals  Skin Integrity (Body Function Structure)  Current Status: risk for impaired skin integrity  Long Term Goals  07/29/2025 06:04 PM - Active  no skin breakdown  Potential for Injury (Safety)  Current Status: risk for falls  Short Term Goals  07/29/2025 06:06 PM - Active  no falls  Long Term Goals  07/29/2025 06:05 PM - Active  no falls    Signed by: Katalina Fang, Nurse

## 2025-08-04 NOTE — THERAPY TREATMENT NOTE
Inpatient Rehabilitation - Occupational Therapy Treatment Note     Nav     Patient Name: Ninoska Stock  : 1980  MRN: [id number]    Today's Date: 2025                 Admit Date: 2025       No diagnosis found.    Patient Active Problem List   Diagnosis    Shortness of breath    Immunization due    Abnormal CT of the chest    Cigarette nicotine dependence without complication    Hypersomnia    Trauma       Past Medical History:   Diagnosis Date    Anxiety     Asthma     Depression     Heart disease     Multiple sclerosis        Past Surgical History:   Procedure Laterality Date    CHOLECYSTECTOMY      FEMUR FRACTURE SURGERY      FOOT SURGERY      HYSTERECTOMY      KIDNEY SURGERY      TUBAL ABDOMINAL LIGATION      WRIST FRACTURE SURGERY               IRF OT ASSESSMENT FLOWSHEET (Last 12 Hours)       IRF OT Evaluation and Treatment       Row Name 25 1528 25 1526       OT Time and Intention    Document Type daily treatment  -KP daily treatment  -TM    Mode of Treatment individual therapy;occupational therapy  -KP occupational therapy  -TM    Total Minutes, Occupational Therapy 45  -KP --    Patient Effort good  -KP good  -TM    Symptoms Noted During/After Treatment none  -KP none  -TM      Row Name 25 1528 25 1526       General Information    Patient Profile Reviewed yes  -KP --    General Observations of Patient Patient agreeable to therapy with no complaints.  -KP Pt agreeable for therapy.  -TM    Existing Precautions/Restrictions fall;cervical collar;brace on at all times  -KP fall;cervical collar;brace on at all times;lifting;spinal;other (see comments)  OVIDIO SEEB  -TM      Row Name 25 1528          Pain    Pretreatment Pain Rating 0/10 - no pain  -KP     Posttreatment Pain Rating 0/10 - no pain  -KP       Row Name 25 1528 25 1526       Cognition/Psychosocial    Affect/Mental Status (Cognition) WFL  - --    Orientation Status (Cognition) oriented x 4   -KP oriented x 4  -TM    Follows Commands (Cognition) WFL  -KP WFL  -TM    Personal Safety Interventions fall prevention program maintained;gait belt;nonskid shoes/slippers when out of bed  - --      Row Name 08/04/25 1528 08/04/25 1526       Bathing    Assistive Device (Bathing) --  -TM long-handled sponge;other (see comments)  issued  -      Row Name 08/04/25 1526          Lower Body Dressing    [place] Level (Lower Body Dressing) minimum assist (75% patient effort);verbal cues  -     Assistive Device Use (Lower Body Dressing) reacher;sock-aid;other (see comments)  issued  -     Position (Lower Body Dressing) supported sitting  -       Row Name 08/04/25 1528          Chair-Bed Transfer    Chair-Bed [place] (Transfers) verbal cues;nonverbal cues (demo/gesture);contact guard  -       Row Name 08/04/25 1528 08/04/25 1526       Motor Skills    Motor Skills -- coordination;functional endurance;motor control/coordination interventions  -    Functional Endurance fair  - --    Motor Control/Coordination Interventions fine motor manipulation/dexterity activities;gross motor coordination activities;occupation/activity based treatment;therapeutic exercise/ROM  tabletop functional reach activity with BUEs, BUE PRE X10 minutes X2, distal dowel ashley  - therapeutic exercise/ROM;fine motor manipulation/dexterity activities;gross motor coordination activities;other (see comments)  LUE ther ex/act, GMC/FMC; light RUE hand/fingers ther activities range of motion as tolerated  -      Row Name 08/04/25 1528          Positioning and Restraints    Pre-Treatment Position sitting in chair/recliner  -     Post Treatment Position bed  -     In Bed fowlers;call light within reach;encouraged to call for assist  -       Row Name 08/04/25 1528          Daily Progress Summary (OT)    Overall Progress Toward Functional Goals (OT) progressing toward functional goals as expected  -               User Key  (r) =  Recorded By, (t) = Taken By, (c) = Cosigned By      Initials Name Effective Dates     Humera Perry OT 06/16/21 -      Maryann Felix OT 06/16/21 -                      Occupational Therapy Education       Title: PT OT SLP Therapies (Done)       Topic: Occupational Therapy (Done)       Point: ADL training (Done)       Learning Progress Summary            Patient Acceptance, E,D, VU,NR by TM at 8/4/2025 1545    Acceptance, E,D, VU,NR by TM at 8/1/2025 1354                      Point: Precautions (Done)       Learning Progress Summary            Patient Acceptance, E,D, VU,NR by TM at 8/4/2025 1545    Acceptance, E,D, VU,NR by TM at 8/1/2025 1354                                      User Key       Initials Effective Dates Name Provider Type Discipline     06/16/21 -  Humera Perry OT Occupational Therapist OT                        OT Recommendation and Plan                         Time Calculation:      Time Calculation- OT       Row Name 08/04/25 1531 08/04/25 1530          Time Calculation- OT    OT Start Time 1245  - 1330  -     OT Stop Time 1330  -TM 1415  -     OT Time Calculation (min) 45 min  -TM 45 min  -     Total Timed Code Minutes- OT 45 minute(s)  - 45 minute(s)  -     OT Non-Billable Time (min) 10 min  -TM --     OT Received On -- 08/04/25  -               User Key  (r) = Recorded By, (t) = Taken By, (c) = Cosigned By      Initials Name Provider Type     Humera Perry OT Occupational Therapist     Maryann Felix OT Occupational Therapist                  Therapy Charges for Today       Code Description Service Date Service Provider Modifiers Qty    70828422278 HC OT SELF CARE/MGMT/TRAIN EA 15 MIN 8/4/2025 Humera Perry OT GO 1    08898000380 HC OT THERAPEUTIC ACT EA 15 MIN 8/4/2025 Humera Perry OT GO 2                     Humera Perry OT  8/4/2025

## 2025-08-04 NOTE — PLAN OF CARE
Goal Outcome Evaluation:  Plan of Care Reviewed With: patient           Problem: Rehabilitation (IRF) Plan of Care  Goal: Plan of Care Review  Outcome: Progressing  Flowsheets (Taken 8/4/2025 0207)  Plan of Care Reviewed With: patient  Goal: Patient-Specific Goal (Individualized)  Outcome: Progressing  Goal: Absence of New-Onset Illness or Injury  Outcome: Progressing  Intervention: Identify and Manage Fall Risk  Description: Perform standard risk assessment on admission using a validated tool or comprehensive approach appropriate to the patient; reassess fall risk frequently, with change in status or transfer to another level of care.Communicate risk to interprofessional healthcare team; ensure fall risk visible cue.Determine need for increased observation, equipment and environmental modification, as well as use of supportive, nonskid footwear.Adjust safety measures to individual needs and identified risk factors.Reinforce the importance of active participation with fall risk prevention, safety and physical activity with the patient and family.Perform regular intentional rounding to assess need for position change, pain management, attention to personal needs and assistance with toileting.  Recent Flowsheet Documentation  Taken 8/4/2025 0200 by Mee Guadarrama RN  Safety Promotion/Fall Prevention: safety round/check completed  Taken 8/4/2025 0000 by Mee Guadarrama RN  Safety Promotion/Fall Prevention: safety round/check completed  Taken 8/3/2025 2200 by Mee Guadarrama RN  Safety Promotion/Fall Prevention: safety round/check completed  Taken 8/3/2025 2000 by Mee Guadarrama RN  Safety Promotion/Fall Prevention: safety round/check completed  Goal: Optimal Comfort and Wellbeing  Outcome: Progressing  Intervention: Provide Person-Centered Care  Description: Use a family-focused approach to care; incorporate family/significant others in assessment, planning, education and support.Develop trust and rapport by  proactively providing information, encouraging questions, addressing concerns and offering reassurance.Acknowledge emotional response; convey safety and acceptance.Recognize and utilize personal coping strategies and strengths; develop goals via shared decision-making.Identify patient's preferred routines, habits and personal preferences; respect privacy and personal space.Recognize progress and patient effort to facilitate motivation; provide opportunities for success.Honor spiritual and cultural preferences.Screen and monitor ongoing safety risks, such as self-harm, violence and elopement.  Recent Flowsheet Documentation  Taken 8/3/2025 1915 by Mee Guadarrama RN  Trust Relationship/Rapport:   care explained   choices provided   questions answered   reassurance provided   thoughts/feelings acknowledged  Goal: Home and Community Transition Plan Established  Outcome: Progressing     Problem: Skin Injury Risk Increased  Goal: Skin Health and Integrity  Outcome: Progressing     Problem: Fall Injury Risk  Goal: Absence of Fall and Fall-Related Injury  Outcome: Progressing  Intervention: Identify and Manage Contributors  Description: Develop a fall prevention plan, considering patient-centered interventions and family/caregiver involvement; identify and address patient's facilitators and barriers.Provide reorientation, appropriate sensory stimulation and routines with changes in mental status to decrease risk of fall.Promote use of personal vision and auditory aids.Assess assistance level required for safe and effective self-care; provide support as needed, such as toileting and mobilization. For age 65 and older, implement timed toileting with assistance.Encourage physical activity, such as performance of mobility and self-care at highest level of patient ability, multicomponent exercise program and provision of appropriate assistive devices.If fall occurs, assess the severity of injury; implement fall injury  protocol. Determine the cause and revise fall injury prevention plan.Regularly review and advocate for medication adjustment to decrease fall risk; consider administration times, polypharmacy and age.Balance adequate pain management with potential for oversedation.  Recent Flowsheet Documentation  Taken 8/4/2025 0200 by Mee Guadarrama RN  Medication Review/Management: medications reviewed  Taken 8/4/2025 0000 by Mee Guadarrama RN  Medication Review/Management: medications reviewed  Taken 8/3/2025 2200 by Mee Guadarrama RN  Medication Review/Management: medications reviewed  Taken 8/3/2025 2000 by Mee Guadarrama RN  Medication Review/Management: medications reviewed  Intervention: Promote Injury-Free Environment  Description: Provide a safe, barrier-free environment that encourages independent activity.Keep care area uncluttered and well-lighted.Determine need for increased observation or monitoring.Avoid use of devices that minimize mobility, such as restraints or indwelling urinary catheter.  Recent Flowsheet Documentation  Taken 8/4/2025 0200 by Mee Guadarrama RN  Safety Promotion/Fall Prevention: safety round/check completed  Taken 8/4/2025 0000 by Mee Guadarrama RN  Safety Promotion/Fall Prevention: safety round/check completed  Taken 8/3/2025 2200 by Mee Guadarrama RN  Safety Promotion/Fall Prevention: safety round/check completed  Taken 8/3/2025 2000 by Mee Guadarrama RN  Safety Promotion/Fall Prevention: safety round/check completed     Problem: Mobility Impairment  Goal: Optimal Mobility [place] and Safety  Outcome: Progressing     Problem: Comorbidity Management  Goal: Blood Pressure in Desired Range  Outcome: Progressing  Intervention: Maintain Blood Pressure Management  Description: Evaluate adherence to home antihypertensive regimen (e.g., exercise and activity, diet modification, medication).Provide scheduled antihypertensive medication; consider administration time and effects  (e.g., avoid giving diuretic prior to bedtime).Monitor response to antihypertensive medication therapy (e.g., blood pressure, electrolyte levels, medication effects).Minimize risk of orthostatic hypotension; encourage caution with position changes, particularly if elderly.  Recent Flowsheet Documentation  Taken 8/4/2025 0200 by Mee Guadarrama RN  Medication Review/Management: medications reviewed  Taken 8/4/2025 0000 by Mee Guadarrama RN  Medication Review/Management: medications reviewed  Taken 8/3/2025 2200 by Mee Guadarrama RN  Medication Review/Management: medications reviewed  Taken 8/3/2025 2000 by Mee Guadarrama RN  Medication Review/Management: medications reviewed

## 2025-08-04 NOTE — PROGRESS NOTES
[facility]  PROGRESS NOTE     Patient Identification:  Name:  Ninoska Stock  Age:  45 y.o.  Sex:  female  :  1980  MRN:  [id number]  Visit Number:  [id number]  ROOM: 107/     Primary Care Provider:  Virgil Gifford MD    Length of stay in inpatient status:  6    Subjective     Chief Compliant: Motor vehicle collision with multiple trauma    History of Presenting Illness: Patient is a 45-year-old female who was recently involved in a 2 vehicle motor vehicle collision.  Patient states she does not remember any of this as she did have loss of consciousness at the scene.  Patient apparently was restrained.  Patient of multiple injuries including right scalp avulsion, closed right proximal femur fracture requiring ORIF, closed right distal radial fracture, C2 vertebral body fracture.  Patient did undergo ORIF of distal right radius and right femur intramedullary ashley replacement.  Patient states that the right leg is the biggest issue at this time causing difficulty with pain but states otherwise seems to be doing reasonably well    Objective     Current Hospital Meds:budesonide-formoterol, 2 puff, Inhalation, BID - RT   And  revefenacin, 175 mcg, Nebulization, Daily - RT  busPIRone, 10 mg, Oral, Q12H  Cariprazine HCl, 6 mg, Oral, Daily  DULoxetine, 60 mg, Oral, Q12H  gabapentin, 800 mg, Oral, Q8H  heparin (porcine), 5,000 Units, Subcutaneous, Q8H  hydroCHLOROthiazide, 25 mg, Oral, Daily  melatonin, 10 mg, Oral, Nightly  meloxicam, 7.5 mg, Oral, Daily  modafinil, 200 mg, Oral, Daily  polyethylene glycol, 17 g, Oral, Daily  senna-docusate sodium, 2 tablet, Oral, BID  verapamil SR, 120 mg, Oral, Q24H  vitamin B-12, 1,000 mcg, Oral, Daily    Pharmacy Consult - Pharmacy to dose,       ----------------------------------------------------------------------------------------------------------------------  Vital Signs:  Temp:  [97.9 °F (36.6 °C)-98.2 °F (36.8 °C)] 98.2 °F (36.8 °C)  Heart Rate:   101  Resp:  [12-20] 20  BP: (116-143)/(69-71) 116/69  SpO2:  [94 %-97 %] 95 %  on   ;   Device (Oxygen Therapy): room air  Body mass index is 48.18 kg/m².    Wt Readings from Last 3 Encounters:   07/29/25 112 kg (246 lb 11.2 oz)   01/30/24 95.3 kg (210 lb)   12/18/23 96.2 kg (212 lb)     Intake & Output (last 3 days)         08/01 0701 08/02 0700 08/02 0701 08/03 0700 08/03 0701 08/04 0700 08/04 0701 08/05 0700    P.O. 960 1800 960 240    Total Intake(mL/kg) 960 (8.6) 1800 (16.1) 960 (8.6) 240 (2.1)    Urine (mL/kg/hr) 1150 (0.4) 550 (0.2) 850 (0.3) 700 (1.6)    Stool  1      Total Output 1150 551 850 700    Net -190 +1249 +110 -460            Urine Unmeasured Occurrence 5 x 4 x 2 x     Stool Unmeasured Occurrence   1 x           Diet: Regular/House; Fluid Consistency: Thin (IDDSI 0)  ----------------------------------------------------------------------------------------------------------------------  Physical exam:  Constitutional: No acute distress  HEENT: Patient does have recent scalp avulsion and wound is healed well.  Neck:   Supple  Cardiovascular: Regular rate and rhythm  Pulmonary/Chest: Clear to auscultation  Abdominal:  .  Positive bowel sounds soft  Musculoskeletal: Status post right radial fracture and right proximal femur fracture both requiring ORIF.  Patient does have cervical collar in place.  Right upper extremity patient does have wrist brace and placed  Neurological: Patient able to move all extremities at this time.  Skin: No rash  Peripheral vascular: No edema  Genitourinary:  ----------------------------------------------------------------------------------------------------------------------    Last echocardiogram:    ----------------------------------------------------------------------------------------------------------------------  Results from last 7 days   Lab Units 08/02/25  0450 07/30/25  0358   WBC 10*3/mm3 7.32 9.68   HEMOGLOBIN g/dL 9.9* 9.5*   HEMATOCRIT % 31.6*  "31.5*   MCV fL 91.9 95.2   MCHC g/dL 31.3* 30.2*   PLATELETS 10*3/mm3 600* 527*         Results from last 7 days   Lab Units 08/02/25  0450 07/30/25  0358   SODIUM mmol/L 133* 138   POTASSIUM mmol/L 3.8 4.6   CHLORIDE mmol/L 93* 101   CO2 mmol/L 24.7 25.3   BUN mg/dL 12.7 9.2   CREATININE mg/dL 0.62 0.64   CALCIUM mg/dL 8.4* 9.2   GLUCOSE mg/dL 104* 110*   ALBUMIN g/dL  --  3.9   BILIRUBIN mg/dL  --  0.3   ALK PHOS U/L  --  107   AST (SGOT) U/L  --  20   ALT (SGPT) U/L  --  18   Estimated Creatinine Clearance: 131.8 mL/min (by C-G formula based on SCr of 0.62 mg/dL).  No results found for: \"AMMONIA\"              No results found for: \"HGBA1C\", \"POCGLU\"  Lab Results   Component Value Date    FREET4 1.1 03/31/2024     Lab Results   Component Value Date    PREGTESTUR Negative 07/15/2025     Pain Management Panel          Latest Ref Rng & Units 7/15/2025   Pain Management Panel   Barbiturates Screen, Urine Cutoff: 200 ng/mL Negative       Benzodiazepine Screen, Urine Cutoff: 200 ng/mL Negative       Cocaine Screen, Urine Cutoff: 300 ng/mL Negative       Fentanyl, Urine Cutoff: 1 ng/mL Presumptive positive. Confirmation by LC-MS/MS to follow.       Hydromorphone <50 ng/mL 627       Methadone Screen , Urine Cutoff: 300 ng/mL Negative          Details          This result is from an external source.             Brief Urine Lab Results  (Last result in the past 365 days)        Color   Clarity   Blood   Leuk Est   Nitrite   Protein   CREAT   Urine HCG        07/15/25 1146 Yellow   Clear     Moderate               07/15/25 1146               Negative             No results found for: \"BLOODCX\"      No results found for: \"URINECX\"  No results found for: \"WOUNDCX\"  No results found for: \"STOOLCX\"        I have personally looked at the labs and they are summarized above.  ----------------------------------------------------------------------------------------------------------------------  Detailed radiology reports for the " last 24 hours:    Imaging Results (Last 24 Hours)       ** No results found for the last 24 hours. **          Final impressions for the last 30 days of radiology reports:    Duplex Venous Lower Extremity - Left CAR  Result Date: 7/24/2025  Right: Limited, normal study; no evidence of acute DVT is identified in the visualized segments as described above. COMMUNICATION: Per this written report. Preliminary report signed by Sharlene Ruiz RVT on 7/23/2025 2:18 PM By electronically signing this report, I, the attending physician, attest that I have personally reviewed the images/data for the above examination(s) and agree with the final edited report. Drafted by Sharlene Ruiz RVT on 7/23/2025 2:10 PM Final report signed by Binu Talley MD on 7/24/2025 1:51 PM    XR Scapula Right  Result Date: 7/19/2025  No acute or healing fracture. CRITICAL RESULT:   No. COMMUNICATION: Per this written report. Drafted by Bin Willson MD on 7/19/2025 12:11 AM Final report signed by Bin Willson MD on 7/19/2025 12:13 AM    XR Shoulder 2+ View Right  Result Date: 7/19/2025  No acute or healing fracture. CRITICAL RESULT:   No. COMMUNICATION: Per this written report. Drafted by Bin Willson MD on 7/19/2025 12:11 AM Final report signed by Bin Willson MD on 7/19/2025 12:13 AM    XR Spine Cervical 2 or 3 View  Result Date: 7/17/2025  C2 left lateral mass fracture is better evaluated in the comparison CT study. CRITICAL RESULT:   No. COMMUNICATION: Per this written report. By electronically signing this report, I, the attending physician, attest that I have personally reviewed the images/data for the above examination(s) and agree with the final edited report. Drafted by Quincy Salazar MD on 7/17/2025 8:23 AM Final report signed by Torres Best MD on 7/17/2025 8:55 AM    XR Femur 2 View Right  Result Date: 7/17/2025  Improved alignment of proximal femoral diaphyseal fracture post nailing. CRITICAL  RESULT:   No. COMMUNICATION: Per this written report. Drafted by Bin Willson MD on 7/17/2025 8:13 AM Final report signed by Bin Willson MD on 7/17/2025 8:14 AM    CT Wrist Right wo IV Contrast  Result Date: 7/16/2025  Limited evaluation due to photon starvation. There is redemonstration of comminuted intra-articular distal radial fracture grossly unchanged alignment from recent radiograph. Within limits of the study, there is no apparent newly visualized fractures. Given the limitations of the study, if clinical concern for further subtle fracture follow-up imaging could be considered to assess for evidence of healing. CRITICAL RESULT:   No. COMMUNICATION: Per this written report. Drafted by Mathew Burrell on 7/16/2025 7:56 AM Final report signed by Mathew Burrell on 7/16/2025 8:02 AM    XR Wrist 3+ View Right  Result Date: 7/15/2025  Traction views obtained at 1719 at 1732 hours once again demonstrates comminuted mildly displaced distal radial fracture with improvement in alignment. Splint was placed with maintained good alignment of the distal fracture fragment CRITICAL RESULT:   No. COMMUNICATION: Per this written report. Drafted by Gonzalo Chowdhury MD on 7/15/2025 6:07 PM Final report signed by Gonzalo Chowdhury MD on 7/15/2025 6:10 PM    XR Wrist 3+ View Right  Result Date: 7/15/2025  Traction views obtained at 1719 at 1732 hours once again demonstrates comminuted mildly displaced distal radial fracture with improvement in alignment. Splint was placed with maintained good alignment of the distal fracture fragment CRITICAL RESULT:   No. COMMUNICATION: Per this written report. Drafted by Gonzalo Chowdhury MD on 7/15/2025 6:07 PM Final report signed by Gonzalo Chowdhury MD on 7/15/2025 6:10 PM    XR Wrist 3+ View Right  Result Date: 7/15/2025  No acute fracture or malalignment CRITICAL RESULT:   No. COMMUNICATION: Per this written report. Drafted by Gonzalo Chowdhury MD on 7/15/2025 4:11 PM Final report signed by Gonzalo Chowdhury MD  on 7/15/2025 4:20 PM    XR Elbow 3+ View Right  Result Date: 7/15/2025  No acute fracture or malalignment CRITICAL RESULT:   No. COMMUNICATION: Per this written report. Drafted by Gonzalo Chowdhury MD on 7/15/2025 4:11 PM Final report signed by Gonzalo Chowdhury MD on 7/15/2025 4:20 PM    XR Forearm 2 View Right  Result Date: 7/15/2025  Acute comminuted displaced right proximal femoral diaphyseal fracture. Acute distal radial metadiaphyseal fracture extending into the metaphysis with impaction and volar displacement of distal fracture fragment with likely involvement of the distal radial ulnar joint. No acute bony findings involving the right knee, tib-fib, foot, ankle, or calcaneus. CRITICAL RESULT:   No. COMMUNICATION: Per this written report. Drafted by Bin Willson MD on 7/15/2025 1:29 PM Final report signed by Bin Willson MD on 7/15/2025 1:34 PM    XR Wrist 3+ View Right  Result Date: 7/15/2025  Acute comminuted displaced right proximal femoral diaphyseal fracture. Acute distal radial metadiaphyseal fracture extending into the metaphysis with impaction and volar displacement of distal fracture fragment with likely involvement of the distal radial ulnar joint. No acute bony findings involving the right knee, tib-fib, foot, ankle, or calcaneus. CRITICAL RESULT:   No. COMMUNICATION: Per this written report. Drafted by Bin Willson MD on 7/15/2025 1:29 PM Final report signed by Bin Willson MD on 7/15/2025 1:34 PM    XR Hand 3+ View Right  Result Date: 7/15/2025  Acute comminuted displaced right proximal femoral diaphyseal fracture. Acute distal radial metadiaphyseal fracture extending into the metaphysis with impaction and volar displacement of distal fracture fragment with likely involvement of the distal radial ulnar joint. No acute bony findings involving the right knee, tib-fib, foot, ankle, or calcaneus. CRITICAL RESULT:   No. COMMUNICATION: Per this written report. Drafted by Bin Willson  MD on 7/15/2025 1:29 PM Final report signed by Bin Willson MD on 7/15/2025 1:34 PM    XR Foot 3+ View Right  Result Date: 7/15/2025  Acute comminuted displaced right proximal femoral diaphyseal fracture. Acute distal radial metadiaphyseal fracture extending into the metaphysis with impaction and volar displacement of distal fracture fragment with likely involvement of the distal radial ulnar joint. No acute bony findings involving the right knee, tib-fib, foot, ankle, or calcaneus. CRITICAL RESULT:   No. COMMUNICATION: Per this written report. Drafted by Bin Willson MD on 7/15/2025 1:29 PM Final report signed by Bin Willson MD on 7/15/2025 1:34 PM    XR Ankle 3+ View Left  Result Date: 7/15/2025  Acute comminuted displaced right proximal femoral diaphyseal fracture. Acute distal radial metadiaphyseal fracture extending into the metaphysis with impaction and volar displacement of distal fracture fragment with likely involvement of the distal radial ulnar joint. No acute bony findings involving the right knee, tib-fib, foot, ankle, or calcaneus. CRITICAL RESULT:   No. COMMUNICATION: Per this written report. Drafted by Bin Willson MD on 7/15/2025 1:29 PM Final report signed by Bin Willson MD on 7/15/2025 1:34 PM    XR Calcaneus Right 1 View  Result Date: 7/15/2025  Acute comminuted displaced right proximal femoral diaphyseal fracture. Acute distal radial metadiaphyseal fracture extending into the metaphysis with impaction and volar displacement of distal fracture fragment with likely involvement of the distal radial ulnar joint. No acute bony findings involving the right knee, tib-fib, foot, ankle, or calcaneus. CRITICAL RESULT:   No. COMMUNICATION: Per this written report. Drafted by Bin Willson MD on 7/15/2025 1:29 PM Final report signed by Bin Willson MD on 7/15/2025 1:34 PM    XR Knee 3 View Right  Result Date: 7/15/2025  Acute comminuted displaced right proximal  femoral diaphyseal fracture. Acute distal radial metadiaphyseal fracture extending into the metaphysis with impaction and volar displacement of distal fracture fragment with likely involvement of the distal radial ulnar joint. No acute bony findings involving the right knee, tib-fib, foot, ankle, or calcaneus. CRITICAL RESULT:   No. COMMUNICATION: Per this written report. Drafted by Bin Willson MD on 7/15/2025 1:29 PM Final report signed by Bin Willson MD on 7/15/2025 1:34 PM    XR Tibia Fibula 2 View Right  Result Date: 7/15/2025  Acute comminuted displaced right proximal femoral diaphyseal fracture. Acute distal radial metadiaphyseal fracture extending into the metaphysis with impaction and volar displacement of distal fracture fragment with likely involvement of the distal radial ulnar joint. No acute bony findings involving the right knee, tib-fib, foot, ankle, or calcaneus. CRITICAL RESULT:   No. COMMUNICATION: Per this written report. Drafted by Bin Willson MD on 7/15/2025 1:29 PM Final report signed by Bin Willson MD on 7/15/2025 1:34 PM    XR Hip With or Without Pelvis 2 - 3 View Right  Result Date: 7/15/2025  Acute comminuted displaced right proximal femoral diaphyseal fracture. Acute distal radial metadiaphyseal fracture extending into the metaphysis with impaction and volar displacement of distal fracture fragment with likely involvement of the distal radial ulnar joint. No acute bony findings involving the right knee, tib-fib, foot, ankle, or calcaneus. CRITICAL RESULT:   No. COMMUNICATION: Per this written report. Drafted by Bin Willson MD on 7/15/2025 1:29 PM Final report signed by Bin Willson MD on 7/15/2025 1:34 PM    XR Femur 2 View Right  Result Date: 7/15/2025  Acute comminuted displaced right proximal femoral diaphyseal fracture. Acute distal radial metadiaphyseal fracture extending into the metaphysis with impaction and volar displacement of distal  fracture fragment with likely involvement of the distal radial ulnar joint. No acute bony findings involving the right knee, tib-fib, foot, ankle, or calcaneus. CRITICAL RESULT:   No. COMMUNICATION: Per this written report. Drafted by Bin Willson MD on 7/15/2025 1:29 PM Final report signed by Bin Willson MD on 7/15/2025 1:34 PM    CT Maxillofacial Without Contrast  Result Date: 7/15/2025  No acute facial fracture is present. CRITICAL RESULT: No. COMMUNICATION: Per this written report. Drafted by Shell Mobley MD on 7/15/2025 12:40 PM Final report signed by Shell Mobley MD on 7/15/2025 12:46 PM    CT Pelvis Without Contrast  Result Date: 7/15/2025  No evidence of traumatic aortic injury. No acute traumatic injury in the chest. No acute traumatic injury in the abdomen and pelvis. Acute fracture involving the left C2 vertebral body extending into the transverse foramina. No acute fracture of the thoracic and lumbar spine. No acute pelvic trauma. Proximal right femoral shaft fracture. CRITICAL RESULT:   No. COMMUNICATION: Per this written report. Drafted by Joselo Amezquita MD on 7/15/2025 12:34 PM Final report signed by Joselo Amezquita MD on 7/15/2025 12:45 PM    CT Lumbar Spine Without Contrast  Result Date: 7/15/2025  No evidence of traumatic aortic injury. No acute traumatic injury in the chest. No acute traumatic injury in the abdomen and pelvis. Acute fracture involving the left C2 vertebral body extending into the transverse foramina. No acute fracture of the thoracic and lumbar spine. No acute pelvic trauma. Proximal right femoral shaft fracture. CRITICAL RESULT:   No. COMMUNICATION: Per this written report. Drafted by Joselo Amezquita MD on 7/15/2025 12:34 PM Final report signed by Joselo Amezquita MD on 7/15/2025 12:45 PM    CT Thoracic Spine Without Contrast  Result Date: 7/15/2025  No evidence of traumatic aortic injury. No acute traumatic injury in the chest. No acute traumatic injury in the abdomen and  pelvis. Acute fracture involving the left C2 vertebral body extending into the transverse foramina. No acute fracture of the thoracic and lumbar spine. No acute pelvic trauma. Proximal right femoral shaft fracture. CRITICAL RESULT:   No. COMMUNICATION: Per this written report. Drafted by Joselo Amezquita MD on 7/15/2025 12:34 PM Final report signed by Joselo Amezquita MD on 7/15/2025 12:45 PM    CT Cervical Spine Without Contrast  Result Date: 7/15/2025  No evidence of traumatic aortic injury. No acute traumatic injury in the chest. No acute traumatic injury in the abdomen and pelvis. Acute fracture involving the left C2 vertebral body extending into the transverse foramina. No acute fracture of the thoracic and lumbar spine. No acute pelvic trauma. Proximal right femoral shaft fracture. CRITICAL RESULT:   No. COMMUNICATION: Per this written report. Drafted by Joselo Amezquita MD on 7/15/2025 12:34 PM Final report signed by Joselo Amezquita MD on 7/15/2025 12:45 PM    CT Abdomen Pelvis With Contrast  Result Date: 7/15/2025  No evidence of traumatic aortic injury. No acute traumatic injury in the chest. No acute traumatic injury in the abdomen and pelvis. Acute fracture involving the left C2 vertebral body extending into the transverse foramina. No acute fracture of the thoracic and lumbar spine. No acute pelvic trauma. Proximal right femoral shaft fracture. CRITICAL RESULT:   No. COMMUNICATION: Per this written report. Drafted by Joselo Amezquita MD on 7/15/2025 12:34 PM Final report signed by Joselo Amezquita MD on 7/15/2025 12:45 PM    CT Angiogram Chest  Result Date: 7/15/2025  No evidence of traumatic aortic injury. No acute traumatic injury in the chest. No acute traumatic injury in the abdomen and pelvis. Acute fracture involving the left C2 vertebral body extending into the transverse foramina. No acute fracture of the thoracic and lumbar spine. No acute pelvic trauma. Proximal right femoral shaft fracture. CRITICAL RESULT:   No.  COMMUNICATION: Per this written report. Drafted by Joselo Amezquita MD on 7/15/2025 12:34 PM Final report signed by Joselo Amezquita MD on 7/15/2025 12:45 PM    CT Angiogram Neck  Result Date: 7/15/2025  Neck CTA: No hemodynamically significant stenosis is present within the cervical carotid and vertebral systems. Head CTA: No hemodynamically significant intracranial arterial stenosis or aneurysm is present. CRITICAL RESULT: No. COMMUNICATION: Per this written report. Drafted by Shell Mobley MD on 7/15/2025 12:22 PM Final report signed by Shell Mobley MD on 7/15/2025 12:37 PM    CT Angiogram Head  Result Date: 7/15/2025  Neck CTA: No hemodynamically significant stenosis is present within the cervical carotid and vertebral systems. Head CTA: No hemodynamically significant intracranial arterial stenosis or aneurysm is present. CRITICAL RESULT: No. COMMUNICATION: Per this written report. Drafted by Shell Mobley MD on 7/15/2025 12:22 PM Final report signed by Shell Mobley MD on 7/15/2025 12:37 PM    CT Head Without Contrast  Result Date: 7/15/2025  1. Large right-sided scalp hematoma with laceration. 2. No CT evidence for acute intracranial hemorrhage.. CRITICAL RESULT:   No. COMMUNICATION: Per this written report. Drafted by Shell Mobley MD on 7/15/2025 12:11 PM Final report signed by Shell Mobley MD on 7/15/2025 12:18 PM    XR Pelvis 1 or 2 View  Result Date: 7/15/2025  No evidence of acute injury within the imaged chest or pelvis. Fracture of the right proximal femur, partially imaged. CRITICAL RESULT:   No. COMMUNICATION: Per this written report. Drafted by Joselo Amezquita MD on 7/15/2025 11:54 AM Final report signed by Joselo Amezquita MD on 7/15/2025 11:55 AM    XR Chest 1 View  Result Date: 7/15/2025  No evidence of acute injury within the imaged chest or pelvis. Fracture of the right proximal femur, partially imaged. CRITICAL RESULT:   No. COMMUNICATION: Per this written report. Drafted by Joselo Amezquita MD on 7/15/2025 11:54 AM  Final report signed by Joselo Amezquita MD on 7/15/2025 11:55 AM    I have personally looked at the radiology images and read the final radiology report.    Assessment & Plan    Multiple traumas secondary to motor vehicle collision--C2 vertebral body fracture, right wrist fracture, right femur fracture, scalp laceration.  Patient is did require ORIF of right femur and right radial fracture.  Patient is weightbearing as tolerated on lower extremities.  Patient is not to bear weight below the elbow of the right upper extremity at this time.  Patient also with hard cervical collar in place.  Last week was requiring contact-guard to minimum assistance for sit to stand stand to sit transfers.  Able to ambulate 160 feet with rolling platform walker and minimum assistance.  Required minimum assistance with toileting.    Depression with anxiety component/ADHD.  Patient seen by psychiatry last week and amitriptyline and Effexor were discontinued.    Patient was started on duloxetine.  Cariprazine was continued.  Patient also continued on BuSpar 10 mg twice daily and has been continued on modafinil for treatment of ADHD.    Thrombocytosis likely reactive.  Will monitor    Asthma continue as needed albuterol and Breztri    Hypertension controlled continue current regimen.    Chronic pain syndrome currently on gabapentin and is on oxycodone because of acute injuries but will be transition back to Norco eventually    Obesity BMI greater than 40 complicates all aspects of care.    Anemia stable        VTE Prophylaxis:   Heparin        Seven Gustafson MD  [facility]  08/04/25  10:54 EDT

## 2025-08-04 NOTE — THERAPY TREATMENT NOTE
Inpatient Rehabilitation - Physical Therapy Treatment Note        Nav     Patient Name: Ninoska Stock  : 1980  MRN: [id number]    Today's Date: 2025                    Admit Date: 2025      Visit Dx:   No diagnosis found.    Patient Active Problem List   Diagnosis    Shortness of breath    Immunization due    Abnormal CT of the chest    Cigarette nicotine dependence without complication    Hypersomnia    Trauma       Past Medical History:   Diagnosis Date    Anxiety     Asthma     Depression     Heart disease     Multiple sclerosis        Past Surgical History:   Procedure Laterality Date    CHOLECYSTECTOMY      FEMUR FRACTURE SURGERY      FOOT SURGERY      HYSTERECTOMY      KIDNEY SURGERY      TUBAL ABDOMINAL LIGATION      WRIST FRACTURE SURGERY         PT ASSESSMENT (Last 12 Hours)       IRF PT Evaluation and Treatment       Row Name 25 1511          PT Time and Intention    Document Type daily treatment  -RG     Mode of Treatment individual therapy;physical therapy  -RG     Patient/Family/Caregiver Comments/Observations Pt and nursing in agreement for skilled PT on this date.  -RG       Row Name 25 1511          General Information    Existing Precautions/Restrictions fall;cervical collar;brace on at all times;lifting  -RG       Row Name 25 1511          Pain Scale: FACES Pre/Post-Treatment    Pain: FACES Scale, Pretreatment 4-->hurts little more  -RG     Posttreatment Pain Rating 4-->hurts little more  -RG       Row Name 25 1511          Cognition/Psychosocial    Affect/Mental Status (Cognition) WFL  -RG     Follows Commands (Cognition) WFL  -RG     Personal Safety Interventions fall prevention program maintained;gait belt;nonskid shoes/slippers when out of bed  -RG       Row Name 25 1511          Mobility    Extremity Weight-bearing Status left lower extremity;right lower extremity;left upper extremity;right upper extremity  -RG     Left Upper Extremity  (Weight-bearing Status) weight-bearing as tolerated (WBAT)  -RG     Right Upper Extremity (Weight-bearing Status) non weight-bearing (NWB)  can use a platform walker  -RG     Left Lower Extremity (Weight-bearing Status) weight-bearing as tolerated (WBAT)  -RG     Right Lower Extremity (Weight-bearing Status) weight-bearing as tolerated (WBAT)  -       Row Name 08/04/25 1511          Bed Mobility    Bed Mobility supine-sit;sit-supine;supine-sit-supine  -       Row Name 08/04/25 1511          Transfer Assessment/Treatment    Transfers bed-chair transfer;chair-bed transfer;sit-stand transfer;stand-sit transfer;stand pivot/stand step transfer;toilet transfer  -       Row Name 08/04/25 1511          Chair-Bed Transfer    Chair-Bed Campbell (Transfers) verbal cues;nonverbal cues (demo/gesture);contact guard  -       Row Name 08/04/25 1511          Sit-Stand Transfer    Sit-Stand Campbell (Transfers) contact guard;minimum assist (75% patient effort);verbal cues;nonverbal cues (demo/gesture)  -     Assistive Device (Sit-Stand Transfers) wheelchair;walker, rolling platform  -       Row Name 08/04/25 1511          Stand-Sit Transfer    Stand-Sit Campbell (Transfers) contact guard;minimum assist (75% patient effort);verbal cues;nonverbal cues (demo/gesture)  -     Assistive Device (Stand-Sit Transfers) wheelchair;walker, rolling platform  -       Row Name 08/04/25 1511          Stand Pivot/Stand Step Transfer    Stand Pivot/Stand Step Campbell (Transfers) verbal cues;nonverbal cues (demo/gesture);contact guard;minimum assist (75% patient effort)  -     Assistive Device (Stand Pivot Stand Step Transfer) --  bed rail  -       Row Name 08/04/25 1511          Gait/Stairs (Locomotion)    Campbell Level (Gait) minimum assist (75% patient effort);1 person to manage equipment;verbal cues;nonverbal cues (demo/gesture)  -     Assistive Device (Gait) walker, rolling platform  -     Patient was  able to Ambulate yes  -RG     Distance in Feet (Gait) --  160 x 2  -RG     Pattern (Gait) step-to  -RG     Deviations/Abnormal Patterns (Gait) magan decreased;gait speed decreased;stride length decreased  -RG     Bilateral Gait Deviations weight shift ability decreased  -RG     Comment, (Gait/Stairs) rest break as needed  -RG       Row Name 08/04/25 1511          Safety Issues/Impairments Affecting Functional Mobility    Impairments Affecting Function (Mobility) balance;endurance/activity tolerance;pain;strength  -RG       Row Name 08/04/25 1511          Hip (Therapeutic Exercise)    Hip Strengthening (Therapeutic Exercise) bilateral;flexion;aBduction;aDduction;marching while seated;sitting;2 lb free weight;resistance band;green;10 repetitions;2 sets  -RG       Row Name 08/04/25 1511          Knee (Therapeutic Exercise)    Knee Strengthening (Therapeutic Exercise) bilateral;flexion;extension;marching while seated;LAQ (long arc quad);sitting;2 lb free weight;resistance band;green;10 repetitions;2 sets  -RG       Row Name 08/04/25 1511          Ankle (Therapeutic Exercise)    Ankle Strengthening (Therapeutic Exercise) bilateral;dorsiflexion;plantarflexion;sitting;10 repetitions;2 sets  -RG       Row Name 08/04/25 1511          Positioning and Restraints    Pre-Treatment Position in bed  -RG     Post Treatment Position wheelchair  -RG     In Bed notified nsg;sitting;with PT  -RG       Row Name 08/04/25 1511          Therapy Assessment/Plan (PT)    Patient's Goals For Discharge return home  -RG       Row Name 08/04/25 1511          Therapy Assessment/Plan (PT)    Rehab Potential/Prognosis (PT) good  -RG     Frequency of Treatment (PT) 5 times per week  -RG     Estimated Duration of Therapy (PT) 2 weeks  -RG     Problem List (PT) balance;mobility;strength;pain  <tolerance to activity/endurance  -RG     Activity Limitations Related to Problem List (PT) unable to ambulate safely;unable to transfer safely  -       Row  Name 08/04/25 1511          Therapy Plan Review/Discharge Plan (PT)    Anticipated Equipment Needs at Discharge (PT Eval) --  tbd  -RG     Anticipated Discharge Disposition (PT) home with outpatient therapy services  -RG       Row Name 08/04/25 1511          IRF PT Goals    Bed Mobility Goal Selection (PT-IRF) bed mobility, PT goal 1  -RG     Transfer Goal Selection (PT-IRF) transfers, PT goal 1  -RG     Gait (Walking Locomotion) Goal Selection (PT-IRF) gait, PT goal 1  -RG       Row Name 08/04/25 1511          Bed Mobility Goal 1 (PT-IRF)    Activity/Assistive Device (Bed Mobility Goal 1, PT-IRF) sit to supine/supine to sit  -RG     Burden Level (Bed Mobility Goal 1, PT-IRF) modified independence  -RG     Time Frame (Bed Mobility Goal 1, PT-IRF) by discharge  -RG       Row Name 08/04/25 1511          Transfer Goal 1 (PT-IRF)    Activity/Assistive Device (Transfer Goal 1, PT-IRF) sit-to-stand/stand-to-sit;bed-to-chair/chair-to-bed  -RG     Burden Level (Transfer Goal 1, PT-IRF) modified independence  -RG     Time Frame (Transfer Goal 1, PT-IRF) by discharge  -RG       Row Name 08/04/25 1511          Gait/Walking Locomotion Goal 1 (PT-IRF)    Activity/Assistive Device (Gait/Walking Locomotion Goal 1, PT-IRF) walker, rolling platform  -RG     Gait/Walking Locomotion Distance Goal 1 (PT-IRF) 300'  -RG     Burden Level (Gait/Walking Locomotion Goal 1, PT-IRF) modified independence  -RG     Time Frame (Gait/Walking Locomotion Goal 1, PT-IRF) by discharge  -RG               User Key  (r) = Recorded By, (t) = Taken By, (c) = Cosigned By      Initials Name Provider Type    RG Scott Bhatia, PTA Physical Therapist Assistant                  Wound 07/29/25 1842 Right anterior greater trochanter Surgical (Active)   Closure Unable to assess 08/03/25 1915   Base unable to visualize 08/03/25 1915   Drainage Amount none 08/03/25 1915       Wound 07/29/25 1843 Right distal wrist Surgical (Active)   Dressing  Appearance dry;intact 08/04/25 0847   Base unable to visualize 08/04/25 0847   Drainage Amount none 08/04/25 0847       Wound 07/29/25 1844 Right anterior temporal region (Active)   Dressing Appearance open to air 08/04/25 0847   Closure Approximated 08/03/25 1915   Base dry;scab 08/04/25 0847   Periwound pink 08/04/25 0847   Periwound Temperature warm 08/04/25 0847   Periwound Skin Turgor soft 08/03/25 1915   Drainage Amount none 08/04/25 0847   Dressing Care open to air 08/04/25 0847       Wound 07/29/25 1849 Right lateral thigh (Active)   Dressing Appearance open to air 08/04/25 0847   Closure Steri strips 08/04/25 0847   Base dry 08/04/25 0847   Periwound dry;pink 08/04/25 0847   Periwound Skin Turgor soft 08/03/25 1915   Edges rolled/closed 08/04/25 0847   Drainage Amount none 08/04/25 0847   Dressing Care open to air 08/04/25 0847     Physical Therapy Education       Title: PT OT SLP Therapies (Done)       Topic: Physical Therapy (Done)       Point: Mobility training (Done)       Learning Progress Summary            Patient Acceptance, E,D, VU,NR by RG at 8/4/2025 1515    Acceptance, E,D, VU,NR by RG at 8/2/2025 1514    Acceptance, E, VU,DU by HC at 8/1/2025 1558    Acceptance, E,D, VU,NR by RG at 8/1/2025 1540    Acceptance, E,D, VU,NR by RG at 7/31/2025 1504    Acceptance, E, VU,NR by  at 7/30/2025 1205                      Point: Home exercise program (Done)       Learning Progress Summary            Patient Acceptance, E,D, VU,NR by RG at 8/4/2025 1515    Acceptance, E,D, VU,NR by RG at 8/2/2025 1514    Acceptance, E, VU,DU by HC at 8/1/2025 1558    Acceptance, E,D, VU,NR by RG at 8/1/2025 1540    Acceptance, E,D, VU,NR by RG at 7/31/2025 1504    Acceptance, E, VU,NR by LB at 7/30/2025 1205                      Point: Body mechanics (Done)       Learning Progress Summary            Patient Acceptance, E,D, VU,NR by RG at 8/4/2025 1515    Acceptance, E,D, VU,NR by RG at 8/2/2025 1514    Acceptance, E,  VU,DU by  at 8/1/2025 1558    Acceptance, E,D, VU,NR by RG at 8/1/2025 1540    Acceptance, E,D, VU,NR by RG at 7/31/2025 1504    Acceptance, E, VU,NR by LB at 7/30/2025 1205                      Point: Precautions (Done)       Learning Progress Summary            Patient Acceptance, E,D, VU,NR by RG at 8/4/2025 1515    Acceptance, E,D, VU,NR by RG at 8/2/2025 1514    Acceptance, E, VU,DU by  at 8/1/2025 1558    Acceptance, E,D, VU,NR by RG at 8/1/2025 1540    Acceptance, E,D, VU,NR by RG at 7/31/2025 1504    Acceptance, E, VU,NR by LB at 7/30/2025 1205                                      User Key       Initials Effective Dates Name Provider Type Discipline     06/16/21 -  Nicolette Pierce, PT Physical Therapist PT     06/16/21 -  Scott Bhatia PTA Physical Therapist Assistant PT     01/20/23 -  Maryam Wills PTA Physical Therapist Assistant PT                    PT Recommendation and Plan    Frequency of Treatment (PT): 5 times per week  Anticipated Equipment Needs at Discharge (PT Eval):  (tbd)                  Time Calculation:      PT Charges       Row Name 08/04/25 1516             Time Calculation    Start Time 1000  -RG      Stop Time 1045  -RG      Time Calculation (min) 45 min  -RG      PT Received On 08/04/25  -RG         Time Calculation- PT    Total Timed Code Minutes- PT 45 minute(s)  -RG                User Key  (r) = Recorded By, (t) = Taken By, (c) = Cosigned By      Initials Name Provider Type    RG Scott Bhatia PTA Physical Therapist Assistant                    Therapy Charges for Today       Code Description Service Date Service Provider Modifiers Qty    21729521464 HC GAIT TRAINING EA 15 MIN 8/4/2025 Scott Bhatia PTA GP, CQ 1    50508539726 HC PT THERAPEUTIC ACT EA 15 MIN 8/4/2025 Scott Bhatia PTA GP, CQ 1    43888480215 HC PT THER PROC EA 15 MIN 8/4/2025 Scott Bhatia PTA GP, CQ 1              PT G-Codes  AM-PAC 6 Clicks Score (PT): 17      Scott Bhatia,  PTA  8/4/2025

## 2025-08-04 NOTE — THERAPY TREATMENT NOTE
Inpatient Rehabilitation - Occupational Therapy Treatment Note     Nav     Patient Name: Ninoska Stock  : 1980  MRN: [id number]    Today's Date: 2025                 Admit Date: 2025       No diagnosis found.    Patient Active Problem List   Diagnosis    Shortness of breath    Immunization due    Abnormal CT of the chest    Cigarette nicotine dependence without complication    Hypersomnia    Trauma       Past Medical History:   Diagnosis Date    Anxiety     Asthma     Depression     Heart disease     Multiple sclerosis        Past Surgical History:   Procedure Laterality Date    CHOLECYSTECTOMY      FEMUR FRACTURE SURGERY      FOOT SURGERY      HYSTERECTOMY      KIDNEY SURGERY      TUBAL ABDOMINAL LIGATION      WRIST FRACTURE SURGERY               IRF OT ASSESSMENT FLOWSHEET (Last 12 Hours)       IRF OT Evaluation and Treatment       Row Name 25 1528 25 1526       OT Time and Intention    Document Type daily treatment  -KP daily treatment  -TM    Mode of Treatment individual therapy;occupational therapy  -KP occupational therapy  -TM    Total Minutes, Occupational Therapy 45  -KP --    Patient Effort good  -KP good  -TM    Symptoms Noted During/After Treatment none  -KP none  -TM      Row Name 25 1528 25 1526       General Information    Patient Profile Reviewed yes  -KP --    General Observations of Patient Patient agreeable to therapy with no complaints.  -KP Pt agreeable for therapy.  -TM    Existing Precautions/Restrictions fall;cervical collar;brace on at all times  -KP fall;cervical collar;brace on at all times;lifting;spinal;other (see comments)  OVIDIO SEEB  -TM      Row Name 25 1528          Pain    Pretreatment Pain Rating 0/10 - no pain  -KP     Posttreatment Pain Rating 0/10 - no pain  -KP       Row Name 25 1528 25 1526       Cognition/Psychosocial    Affect/Mental Status (Cognition) WFL  - --    Orientation Status (Cognition) oriented x 4   - oriented x 4  -TM    Follows Commands (Cognition) WFL  -KP WFL  -TM    Personal Safety Interventions fall prevention program maintained;gait belt;nonskid shoes/slippers when out of bed  - --      Row Name 08/04/25 1526          Lower Body Dressing    Worth Level (Lower Body Dressing) minimum assist (75% patient effort);verbal cues  -     Assistive Device Use (Lower Body Dressing) reacher;sock-aid;other (see comments)  issued  -     Position (Lower Body Dressing) supported sitting  -       Row Name 08/04/25 1528          Chair-Bed Transfer    Chair-Bed Worth (Transfers) verbal cues;nonverbal cues (demo/gesture);contact guard  -       Row Name 08/04/25 1528 08/04/25 1526       Motor Skills    Motor Skills -- coordination;functional endurance;motor control/coordination interventions  -    Functional Endurance fair  - --    Motor Control/Coordination Interventions fine motor manipulation/dexterity activities;gross motor coordination activities;occupation/activity based treatment;therapeutic exercise/ROM  tabletop functional reach activity with BUEs, BUE PRE X10 minutes X2, distal dowel ashley  - therapeutic exercise/ROM;fine motor manipulation/dexterity activities;gross motor coordination activities;other (see comments)  LUE ther ex/act, GMC/FMC; light RUE hand/fingers ther activities range of motion as tolerated  -      Row Name 08/04/25 1528          Positioning and Restraints    Pre-Treatment Position sitting in chair/recliner  -     Post Treatment Position bed  -KP     In Bed fowlers;call light within reach;encouraged to call for assist  -       Row Name 08/04/25 1528          Daily Progress Summary (OT)    Overall Progress Toward Functional Goals (OT) progressing toward functional goals as expected  -               User Key  (r) = Recorded By, (t) = Taken By, (c) = Cosigned By      Initials Name Effective Dates    TM Humera Perry, OT 06/16/21 -      Maryann Felix  OT 06/16/21 -                      Occupational Therapy Education       Title: PT OT SLP Therapies (Done)       Topic: Occupational Therapy (Done)       Point: ADL training (Done)       Learning Progress Summary            Patient Acceptance, E,D, VU,NR by TM at 8/1/2025 1354                      Point: Precautions (Done)       Learning Progress Summary            Patient Acceptance, E,D, VU,NR by TM at 8/1/2025 1354                                      User Key       Initials Effective Dates Name Provider Type Discipline     06/16/21 -  Humera Perry OT Occupational Therapist OT                        OT Recommendation and Plan    Planned Therapy Interventions (OT): activity tolerance training, adaptive equipment training, BADL retraining, functional balance retraining, occupation/activity based interventions, passive ROM/stretching, patient/caregiver education/training, ROM/therapeutic exercise, strengthening exercise, transfer/mobility retraining       Daily Progress Summary (OT)  Overall Progress Toward Functional Goals (OT): progressing toward functional goals as expected            Time Calculation:      Time Calculation- OT       Row Name 08/04/25 1530             Time Calculation- OT    OT Start Time 1330  -      OT Stop Time 1415  -      OT Time Calculation (min) 45 min  -      Total Timed Code Minutes- OT 45 minute(s)  -      OT Received On 08/04/25  -                User Key  (r) = Recorded By, (t) = Taken By, (c) = Cosigned By      Initials Name Provider Type     Maryann Felix OT Occupational Therapist                  Therapy Charges for Today       Code Description Service Date Service Provider Modifiers Qty    53125699957 HC OT THERAPEUTIC ACT EA 15 MIN 8/4/2025 Maryann Felix OT GO 1    20476304146 HC OT THER PROC EA 15 MIN 8/4/2025 Maryann Felix OT GO 2                     Maryann Felix OT  8/4/2025

## 2025-08-04 NOTE — THERAPY TREATMENT NOTE
Inpatient Rehabilitation - Physical Therapy Treatment Note        Nav     Patient Name: Ninoska Stock  : 1980  MRN: [id number]    Today's Date: 2025                    Admit Date: 2025      Visit Dx:   No diagnosis found.    Patient Active Problem List   Diagnosis    Shortness of breath    Immunization due    Abnormal CT of the chest    Cigarette nicotine dependence without complication    Hypersomnia    Trauma       Past Medical History:   Diagnosis Date    Anxiety     Asthma     Depression     Heart disease     Multiple sclerosis        Past Surgical History:   Procedure Laterality Date    CHOLECYSTECTOMY      FEMUR FRACTURE SURGERY      FOOT SURGERY      HYSTERECTOMY      KIDNEY SURGERY      TUBAL ABDOMINAL LIGATION      WRIST FRACTURE SURGERY         PT ASSESSMENT (Last 12 Hours)       IRF PT Evaluation and Treatment       Row Name 25 1514 25 1511       PT Time and Intention    Document Type daily treatment  -HC daily treatment  -RG    Mode of Treatment individual therapy;physical therapy  -HC individual therapy;physical therapy  -RG    Patient/Family/Caregiver Comments/Observations Pt and RN in agreement for PT. PT walked 80' x 2 with 60' with PRW CGA. Standing exercises in // bars to tolerance. Pt C/O pain in R LE.  -HC Pt and nursing in agreement for skilled PT on this date.  -RG      Row Name 25 1514 25 1511       General Information    Existing Precautions/Restrictions fall;cervical collar;brace on at all times;lifting  -HC fall;cervical collar;brace on at all times;lifting  -RG      Row Name 25 1514 25 1511       Pain Scale: FACES Pre/Post-Treatment    Pain: FACES Scale, Pretreatment 4-->hurts little more  -HC 4-->hurts little more  -RG    Posttreatment Pain Rating 4-->hurts little more  -HC 4-->hurts little more  -RG      Row Name 25 1514 25 1511       Cognition/Psychosocial    Affect/Mental Status (Cognition) WFL  -HC WFL  -RG     Follows Commands (Cognition) WFL  -HC WFL  -    Personal Safety Interventions fall prevention program maintained;gait belt;supervised activity;nonskid shoes/slippers when out of bed  - fall prevention program maintained;gait belt;nonskid shoes/slippers when out of bed  -      Row Name 08/04/25 1514 08/04/25 1511       Mobility    Extremity Weight-bearing Status left lower extremity;right lower extremity;left upper extremity;right upper extremity  - left lower extremity;right lower extremity;left upper extremity;right upper extremity  -RG    Left Upper Extremity (Weight-bearing Status) weight-bearing as tolerated (WBAT)  -HC weight-bearing as tolerated (WBAT)  -RG    Right Upper Extremity (Weight-bearing Status) non weight-bearing (NWB)  can use a platform walker  -HC non weight-bearing (NWB)  can use a platform walker  -RG    Left Lower Extremity (Weight-bearing Status) weight-bearing as tolerated (WBAT)  -HC weight-bearing as tolerated (WBAT)  -RG    Right Lower Extremity (Weight-bearing Status) weight-bearing as tolerated (WBAT)  -HC weight-bearing as tolerated (WBAT)  -      Row Name 08/04/25 1514 08/04/25 1511       Bed Mobility    Bed Mobility supine-sit;sit-supine;supine-sit-supine  - supine-sit;sit-supine;supine-sit-supine  -      Row Name 08/04/25 1514 08/04/25 1511       Transfer Assessment/Treatment    Transfers bed-chair transfer;chair-bed transfer;sit-stand transfer;stand-sit transfer;stand pivot/stand step transfer;toilet transfer  - bed-chair transfer;chair-bed transfer;sit-stand transfer;stand-sit transfer;stand pivot/stand step transfer;toilet transfer  -      Row Name 08/04/25 1514 08/04/25 1511       Chair-Bed Transfer    Chair-Bed [place] (Transfers) verbal cues;nonverbal cues (demo/gesture);contact guard  - verbal cues;nonverbal cues (demo/gesture);contact guard  -      Row Name 08/04/25 1514 08/04/25 1511       Sit-Stand Transfer    Sit-Stand [place] (Transfers)  contact guard;minimum assist (75% patient effort);verbal cues;nonverbal cues (demo/gesture)  - contact guard;minimum assist (75% patient effort);verbal cues;nonverbal cues (demo/gesture)  -RG    Assistive Device (Sit-Stand Transfers) wheelchair;walker, rolling platform  - wheelchair;walker, rolling platform  -RG      Row Name 08/04/25 1514 08/04/25 1511       Stand-Sit Transfer    Stand-Sit [place] (Transfers) contact guard;minimum assist (75% patient effort);verbal cues;nonverbal cues (demo/gesture)  - contact guard;minimum assist (75% patient effort);verbal cues;nonverbal cues (demo/gesture)  -RG    Assistive Device (Stand-Sit Transfers) wheelchair;walker, rolling platform  -HC wheelchair;walker, rolling platform  -      Row Name 08/04/25 1514 08/04/25 1511       Stand Pivot/Stand Step Transfer    Stand Pivot/Stand Step [place] (Transfers) verbal cues;nonverbal cues (demo/gesture);contact guard;minimum assist (75% patient effort)  - verbal cues;nonverbal cues (demo/gesture);contact guard;minimum assist (75% patient effort)  -RG    Assistive Device (Stand Pivot Stand Step Transfer) --  bed rail  - --  bed rail  -      Row Name 08/04/25 1514          Toilet Transfer    Type (Toilet Transfer) stand pivot/stand step  -     [place] Level (Toilet Transfer) verbal cues;nonverbal cues (demo/gesture);supervision;contact guard  -       Row Name 08/04/25 1514 08/04/25 1511       Gait/Stairs (Locomotion)    [place] Level (Gait) 1 person to manage equipment;verbal cues;nonverbal cues (demo/gesture);contact guard  - minimum assist (75% patient effort);1 person to manage equipment;verbal cues;nonverbal cues (demo/gesture)  -RG    Assistive Device (Gait) walker, rolling platform  -HC walker, rolling platform  -RG    Patient was able to Ambulate -- yes  -RG    Distance in Feet (Gait) 80  80' x 2, 60'  -HC --  160 x 2  -RG    Pattern (Gait) step-to  -HC step-to  -RG    Deviations/Abnormal  Patterns (Gait) magan decreased;gait speed decreased;stride length decreased  -HC magan decreased;gait speed decreased;stride length decreased  -RG    Bilateral Gait Deviations weight shift ability decreased  -HC weight shift ability decreased  -RG    Comment, (Gait/Stairs) -- rest break as needed  -RG      Row Name 08/04/25 1514 08/04/25 1511       Safety Issues/Impairments Affecting Functional Mobility    Impairments Affecting Function (Mobility) balance;endurance/activity tolerance;pain;strength  -HC balance;endurance/activity tolerance;pain;strength  -RG      Row Name 08/04/25 1514          Motor Skills    Therapeutic Exercise other (see comments)  // bars: March, hip abd, mini squats, HS curls, calf raise, bean bag toss/  with reacher.  -HC       Row Name 08/04/25 1511          Hip (Therapeutic Exercise)    Hip Strengthening (Therapeutic Exercise) bilateral;flexion;aBduction;aDduction;marching while seated;sitting;2 lb free weight;resistance band;green;10 repetitions;2 sets  -RG       Row Name 08/04/25 1511          Knee (Therapeutic Exercise)    Knee Strengthening (Therapeutic Exercise) bilateral;flexion;extension;marching while seated;LAQ (long arc quad);sitting;2 lb free weight;resistance band;green;10 repetitions;2 sets  -RG       Row Name 08/04/25 1511          Ankle (Therapeutic Exercise)    Ankle Strengthening (Therapeutic Exercise) bilateral;dorsiflexion;plantarflexion;sitting;10 repetitions;2 sets  -RG       Row Name 08/04/25 1514 08/04/25 1511       Positioning and Restraints    Pre-Treatment Position other (comment)  WC from PT  -HC in bed  -RG    Post Treatment Position wheelchair  -HC wheelchair  -RG    In Bed -- notified nsg;sitting;with PT  -RG    In Wheelchair sitting;call light within reach;encouraged to call for assist  -HC --      Row Name 08/04/25 1514 08/04/25 1511       Therapy Assessment/Plan (PT)    Patient's Goals For Discharge return home  -HC return home  -RG      Row Name  08/04/25 1514 08/04/25 1511       Therapy Assessment/Plan (PT)    Rehab Potential/Prognosis (PT) good  -HC good  -RG    Frequency of Treatment (PT) 5 times per week  -HC 5 times per week  -RG    Estimated Duration of Therapy (PT) 2 weeks  -HC 2 weeks  -RG    Problem List (PT) balance;mobility;strength;pain  <tolerance to activity/endurance  -HC balance;mobility;strength;pain  <tolerance to activity/endurance  -RG    Activity Limitations Related to Problem List (PT) unable to ambulate safely;unable to transfer safely  -HC unable to ambulate safely;unable to transfer safely  -RG      Row Name 08/04/25 1514 08/04/25 1511       Therapy Plan Review/Discharge Plan (PT)    Anticipated Equipment Needs at Discharge (PT Eval) --  tbd  -HC --  tbd  -RG    Anticipated Discharge Disposition (PT) home with outpatient therapy services  -HC home with outpatient therapy services  -RG      Row Name 08/04/25 1514 08/04/25 1511       IRF PT Goals    Bed Mobility Goal Selection (PT-IRF) bed mobility, PT goal 1  -HC bed mobility, PT goal 1  -RG    Transfer Goal Selection (PT-IRF) transfers, PT goal 1  -HC transfers, PT goal 1  -RG    Gait (Walking Locomotion) Goal Selection (PT-IRF) gait, PT goal 1  -HC gait, PT goal 1  -RG      Row Name 08/04/25 1514 08/04/25 1511       Bed Mobility Goal 1 (PT-IRF)    Activity/Assistive Device (Bed Mobility Goal 1, PT-IRF) sit to supine/supine to sit  -HC sit to supine/supine to sit  -RG    [place] Level (Bed Mobility Goal 1, PT-IRF) modified independence  -HC modified independence  -RG    Time Frame (Bed Mobility Goal 1, PT-IRF) by discharge  -HC by discharge  -RG      Row Name 08/04/25 1514 08/04/25 1511       Transfer Goal 1 (PT-IRF)    Activity/Assistive Device (Transfer Goal 1, PT-IRF) sit-to-stand/stand-to-sit;bed-to-chair/chair-to-bed  -HC sit-to-stand/stand-to-sit;bed-to-chair/chair-to-bed  -RG    [place] Level (Transfer Goal 1, PT-IRF) modified independence  -HC modified independence   -RG    Time Frame (Transfer Goal 1, PT-IRF) by discharge  -HC by discharge  -RG      Row Name 08/04/25 1514 08/04/25 1511       Gait/Walking Locomotion Goal 1 (PT-IRF)    Activity/Assistive Device (Gait/Walking Locomotion Goal 1, PT-IRF) walker, rolling platform  -HC walker, rolling platform  -RG    Gait/Walking Locomotion Distance Goal 1 (PT-IRF) 300'  -'  -RG    [place] Level (Gait/Walking Locomotion Goal 1, PT-IRF) modified independence  -HC modified independence  -RG    Time Frame (Gait/Walking Locomotion Goal 1, PT-IRF) by discharge  -HC by discharge  -RG              User Key  (r) = Recorded By, (t) = Taken By, (c) = Cosigned By      Initials Name Provider Type    RG Scott Bhatia, MAKENZIE Physical Therapist Assistant     Maryam Wills PTA Physical Therapist Assistant                  Wound 07/29/25 1842 Right anterior greater trochanter Surgical (Active)   Closure Unable to assess 08/03/25 1915   Base unable to visualize 08/03/25 1915   Drainage Amount none 08/03/25 1915       Wound 07/29/25 1843 Right distal wrist Surgical (Active)   Dressing Appearance dry;intact 08/04/25 0847   Base unable to visualize 08/04/25 0847   Drainage Amount none 08/04/25 0847       Wound 07/29/25 1844 Right anterior temporal region (Active)   Dressing Appearance open to air 08/04/25 0847   Closure Approximated 08/03/25 1915   Base dry;scab 08/04/25 0847   Periwound pink 08/04/25 0847   Periwound Temperature warm 08/04/25 0847   Periwound Skin Turgor soft 08/03/25 1915   Drainage Amount none 08/04/25 0847   Dressing Care open to air 08/04/25 0847       Wound 07/29/25 1849 Right lateral thigh (Active)   Dressing Appearance open to air 08/04/25 0847   Closure Steri strips 08/04/25 0847   Base dry 08/04/25 0847   Periwound dry;pink 08/04/25 0847   Periwound Skin Turgor soft 08/03/25 1915   Edges rolled/closed 08/04/25 0847   Drainage Amount none 08/04/25 0847   Dressing Care open to air 08/04/25 0847     Physical  Therapy Education       Title: PT OT SLP Therapies (Done)       Topic: Physical Therapy (Done)       Point: Mobility training (Done)       Learning Progress Summary            Patient Acceptance, E,TB, VU,DU by HC at 8/4/2025 1518    Acceptance, E,D, VU,NR by RG at 8/4/2025 1515    Acceptance, E,D, VU,NR by RG at 8/2/2025 1514    Acceptance, E, VU,DU by HC at 8/1/2025 1558    Acceptance, E,D, VU,NR by RG at 8/1/2025 1540    Acceptance, E,D, VU,NR by RG at 7/31/2025 1504    Acceptance, E, VU,NR by LB at 7/30/2025 1205                      Point: Home exercise program (Done)       Learning Progress Summary            Patient Acceptance, E,TB, VU,DU by HC at 8/4/2025 1518    Acceptance, E,D, VU,NR by RG at 8/4/2025 1515    Acceptance, E,D, VU,NR by RG at 8/2/2025 1514    Acceptance, E, VU,DU by HC at 8/1/2025 1558    Acceptance, E,D, VU,NR by RG at 8/1/2025 1540    Acceptance, E,D, VU,NR by RG at 7/31/2025 1504    Acceptance, E, VU,NR by LB at 7/30/2025 1205                      Point: Body mechanics (Done)       Learning Progress Summary            Patient Acceptance, E,TB, VU,DU by HC at 8/4/2025 1518    Acceptance, E,D, VU,NR by RG at 8/4/2025 1515    Acceptance, E,D, VU,NR by RG at 8/2/2025 1514    Acceptance, E, VU,DU by HC at 8/1/2025 1558    Acceptance, E,D, VU,NR by RG at 8/1/2025 1540    Acceptance, E,D, VU,NR by RG at 7/31/2025 1504    Acceptance, E, VU,NR by LB at 7/30/2025 1205                      Point: Precautions (Done)       Learning Progress Summary            Patient Acceptance, E,TB, VU,DU by HC at 8/4/2025 1518    Acceptance, E,D, VU,NR by RG at 8/4/2025 1515    Acceptance, E,D, VU,NR by RG at 8/2/2025 1514    Acceptance, E, VU,DU by HC at 8/1/2025 1558    Acceptance, E,D, VU,NR by RG at 8/1/2025 1540    Acceptance, E,D, VU,NR by RG at 7/31/2025 1504    Acceptance, E, VU,NR by LB at 7/30/2025 1205                                      User Key       Initials Effective Dates Name Provider Type  Discipline    LB 06/16/21 -  Nicolette Pierce, PT Physical Therapist PT    RG 06/16/21 -  Scott Bhatia PTA Physical Therapist Assistant PT    HC 01/20/23 -  Maryam Wills PTA Physical Therapist Assistant PT                    PT Recommendation and Plan    Frequency of Treatment (PT): 5 times per week  Anticipated Equipment Needs at Discharge (PT Eval):  (tbd)                  Time Calculation:      PT Charges       Row Name 08/04/25 1519 08/04/25 1516          Time Calculation    Start Time 1045  -HC 1000  -RG     Stop Time 1130  -HC 1045  -RG     Time Calculation (min) 45 min  -HC 45 min  -RG     PT Received On 08/04/25  -HC 08/04/25  -RG        Time Calculation- PT    Total Timed Code Minutes- PT 45 minute(s)  -HC 45 minute(s)  -RG               User Key  (r) = Recorded By, (t) = Taken By, (c) = Cosigned By      Initials Name Provider Type    RG Scott Bhatia PTA Physical Therapist Assistant    HC Maryam Wills PTA Physical Therapist Assistant                    Therapy Charges for Today       Code Description Service Date Service Provider Modifiers Qty    00885941485 HC GAIT TRAINING EA 15 MIN 8/4/2025 Maryam Wills, MAKENZIE GP, CQ 1    03849251032 HC PT THER PROC EA 15 MIN 8/4/2025 Maryam Wills PTA GP, CQ 1    50653536263 HC PT THERAPEUTIC ACT EA 15 MIN 8/4/2025 Maryam Wills PTA GP, CQ 1              PT G-Codes  AM-PAC 6 Clicks Score (PT): 17      Maryam Wills PTA  8/4/2025

## 2025-08-05 PROCEDURE — 94799 UNLISTED PULMONARY SVC/PX: CPT

## 2025-08-05 PROCEDURE — 97116 GAIT TRAINING THERAPY: CPT

## 2025-08-05 PROCEDURE — 97535 SELF CARE MNGMENT TRAINING: CPT

## 2025-08-05 PROCEDURE — 94760 N-INVAS EAR/PLS OXIMETRY 1: CPT

## 2025-08-05 PROCEDURE — 97110 THERAPEUTIC EXERCISES: CPT

## 2025-08-05 PROCEDURE — 97530 THERAPEUTIC ACTIVITIES: CPT

## 2025-08-05 PROCEDURE — 94664 DEMO&/EVAL PT USE INHALER: CPT

## 2025-08-05 PROCEDURE — 94761 N-INVAS EAR/PLS OXIMETRY MLT: CPT

## 2025-08-05 PROCEDURE — 99232 SBSQ HOSP IP/OBS MODERATE 35: CPT | Performed by: FAMILY MEDICINE

## 2025-08-05 PROCEDURE — 25010000002 HEPARIN (PORCINE) PER 1000 UNITS: Performed by: INTERNAL MEDICINE

## 2025-08-05 RX ADMIN — VERAPAMIL HYDROCHLORIDE 120 MG: 120 TABLET, FILM COATED, EXTENDED RELEASE ORAL at 08:06

## 2025-08-05 RX ADMIN — HYDROCHLOROTHIAZIDE 25 MG: 25 TABLET ORAL at 08:05

## 2025-08-05 RX ADMIN — GABAPENTIN 800 MG: 400 CAPSULE ORAL at 15:22

## 2025-08-05 RX ADMIN — BUSPIRONE HYDROCHLORIDE 10 MG: 10 TABLET ORAL at 08:06

## 2025-08-05 RX ADMIN — HEPARIN SODIUM 5000 UNITS: 5000 INJECTION INTRAVENOUS; SUBCUTANEOUS at 15:21

## 2025-08-05 RX ADMIN — GABAPENTIN 800 MG: 400 CAPSULE ORAL at 21:01

## 2025-08-05 RX ADMIN — MODAFINIL 200 MG: 100 TABLET ORAL at 08:06

## 2025-08-05 RX ADMIN — SENNOSIDES AND DOCUSATE SODIUM 2 TABLET: 50; 8.6 TABLET ORAL at 20:29

## 2025-08-05 RX ADMIN — OXYCODONE HYDROCHLORIDE 10 MG: 10 TABLET ORAL at 20:26

## 2025-08-05 RX ADMIN — DULOXETINE 60 MG: 60 CAPSULE, DELAYED RELEASE ORAL at 20:28

## 2025-08-05 RX ADMIN — GABAPENTIN 800 MG: 400 CAPSULE ORAL at 05:11

## 2025-08-05 RX ADMIN — MELOXICAM 7.5 MG: 7.5 TABLET ORAL at 08:06

## 2025-08-05 RX ADMIN — DULOXETINE 60 MG: 60 CAPSULE, DELAYED RELEASE ORAL at 08:06

## 2025-08-05 RX ADMIN — OXYCODONE HYDROCHLORIDE 10 MG: 10 TABLET ORAL at 10:15

## 2025-08-05 RX ADMIN — HEPARIN SODIUM 5000 UNITS: 5000 INJECTION INTRAVENOUS; SUBCUTANEOUS at 05:11

## 2025-08-05 RX ADMIN — BUDESONIDE AND FORMOTEROL FUMARATE DIHYDRATE 2 PUFF: 160; 4.5 AEROSOL RESPIRATORY (INHALATION) at 19:10

## 2025-08-05 RX ADMIN — CARIPRAZINE 6 MG: 3 CAPSULE, GELATIN COATED ORAL at 08:06

## 2025-08-05 RX ADMIN — Medication 10 MG: at 20:28

## 2025-08-05 RX ADMIN — BUDESONIDE AND FORMOTEROL FUMARATE DIHYDRATE 2 PUFF: 160; 4.5 AEROSOL RESPIRATORY (INHALATION) at 07:18

## 2025-08-05 RX ADMIN — HYDROXYZINE HYDROCHLORIDE 25 MG: 25 TABLET, FILM COATED ORAL at 20:26

## 2025-08-05 RX ADMIN — HEPARIN SODIUM 5000 UNITS: 5000 INJECTION INTRAVENOUS; SUBCUTANEOUS at 21:01

## 2025-08-05 RX ADMIN — BUSPIRONE HYDROCHLORIDE 10 MG: 10 TABLET ORAL at 20:28

## 2025-08-05 RX ADMIN — Medication 1000 MCG: at 08:06

## 2025-08-05 RX ADMIN — METHOCARBAMOL TABLETS 500 MG: 500 TABLET, COATED ORAL at 15:25

## 2025-08-05 NOTE — PLAN OF CARE
Goal Outcome Evaluation:  Plan of Care Reviewed With: patient        Progress: improving  Outcome Summary: BLUE SAFETY BAND NOTED; C COLLAR NOTED; BRACE TO RIGHT DISTAL FOREARM/WRIST NOTED; FALL SAFETY INTERVENTION LEVELS EDUCATION GIVEN; PROGRESSING WITH CURRENT THERAPIES; CONTINUE PLAN OF CARE; MONITOR

## 2025-08-05 NOTE — THERAPY TREATMENT NOTE
Inpatient Rehabilitation - Physical Therapy Treatment Note        Nav     Patient Name: Ninoska Stock  : 1980  MRN: [id number]    Today's Date: 2025                    Admit Date: 2025      Visit Dx:   No diagnosis found.    Patient Active Problem List   Diagnosis    Shortness of breath    Immunization due    Abnormal CT of the chest    Cigarette nicotine dependence without complication    Hypersomnia    Trauma       Past Medical History:   Diagnosis Date    Anxiety     Asthma     Depression     Heart disease     Multiple sclerosis        Past Surgical History:   Procedure Laterality Date    CHOLECYSTECTOMY      FEMUR FRACTURE SURGERY      FOOT SURGERY      HYSTERECTOMY      KIDNEY SURGERY      TUBAL ABDOMINAL LIGATION      WRIST FRACTURE SURGERY         PT ASSESSMENT (Last 12 Hours)       IRF PT Evaluation and Treatment       Row Name 25 1535          PT Time and Intention    Document Type daily treatment  -RG     Mode of Treatment individual therapy;physical therapy  -RG     Patient/Family/Caregiver Comments/Observations Pt and nursing in agreement for skilled PT on this date.  -RG       Row Name 25 1538          General Information    Existing Precautions/Restrictions fall;cervical collar;brace on at all times;lifting  -RG       Row Name 25 1533          Pain Scale: FACES Pre/Post-Treatment    Pain: FACES Scale, Pretreatment 4-->hurts little more  -RG     Posttreatment Pain Rating 4-->hurts little more  -RG       Row Name 25 1532          Cognition/Psychosocial    Affect/Mental Status (Cognition) WFL  -RG     Follows Commands (Cognition) WFL  -RG     Personal Safety Interventions fall prevention program maintained;gait belt;nonskid shoes/slippers when out of bed  -RG       Row Name 25 1530          Mobility    Extremity Weight-bearing Status left lower extremity;right lower extremity;left upper extremity;right upper extremity  -RG     Left Upper Extremity  (Weight-bearing Status) weight-bearing as tolerated (WBAT)  -RG     Right Upper Extremity (Weight-bearing Status) non weight-bearing (NWB)  can use a platform walker  -RG     Left Lower Extremity (Weight-bearing Status) weight-bearing as tolerated (WBAT)  -RG     Right Lower Extremity (Weight-bearing Status) weight-bearing as tolerated (WBAT)  -       Row Name 08/05/25 1535          Bed Mobility    Bed Mobility supine-sit;sit-supine;supine-sit-supine  -       Row Name 08/05/25 1535          Transfer Assessment/Treatment    Transfers bed-chair transfer;chair-bed transfer;sit-stand transfer;stand-sit transfer;stand pivot/stand step transfer;toilet transfer  -       Row Name 08/05/25 1535          Chair-Bed Transfer    Chair-Bed [place] (Transfers) verbal cues;nonverbal cues (demo/gesture);contact guard  -       Row Name 08/05/25 1535          Sit-Stand Transfer    Sit-Stand [place] (Transfers) contact guard;minimum assist (75% patient effort);verbal cues;nonverbal cues (demo/gesture)  -     Assistive Device (Sit-Stand Transfers) wheelchair;walker, rolling platform  -       Row Name 08/05/25 1535          Stand-Sit Transfer    Stand-Sit [place] (Transfers) contact guard;minimum assist (75% patient effort);verbal cues;nonverbal cues (demo/gesture)  -     Assistive Device (Stand-Sit Transfers) wheelchair;walker, rolling platform  -       Row Name 08/05/25 1535          Stand Pivot/Stand Step Transfer    Stand Pivot/Stand Step [place] (Transfers) verbal cues;nonverbal cues (demo/gesture);contact guard;minimum assist (75% patient effort)  -     Assistive Device (Stand Pivot Stand Step Transfer) --  bed rail  -       Row Name 08/05/25 1535          Toilet Transfer    Type (Toilet Transfer) stand pivot/stand step  -     [place] Level (Toilet Transfer) verbal cues;nonverbal cues (demo/gesture);supervision;contact guard  -       Row Name 08/05/25 1535          Gait/Stairs  (Locomotion)    [place] Level (Gait) 1 person to manage equipment;verbal cues;nonverbal cues (demo/gesture);contact guard  -RG     Assistive Device (Gait) walker, rolling platform  -RG     Patient was able to Ambulate yes  -RG     Distance in Feet (Gait) --  80 x 2  -RG     Pattern (Gait) step-to  -RG     Deviations/Abnormal Patterns (Gait) magan decreased;gait speed decreased;stride length decreased  -RG     Bilateral Gait Deviations weight shift ability decreased  -RG     Comment, (Gait/Stairs) rest break as needed  -RG       Row Name 08/05/25 1535          Safety Issues/Impairments Affecting Functional Mobility    Impairments Affecting Function (Mobility) balance;endurance/activity tolerance;pain;strength  -RG       Row Name 08/05/25 1535          Hip (Therapeutic Exercise)    Hip Strengthening (Therapeutic Exercise) bilateral;flexion;aBduction;aDduction;marching while seated;sitting;2 lb free weight;resistance band;green;10 repetitions;2 sets  -RG       Row Name 08/05/25 1535          Knee (Therapeutic Exercise)    Knee Strengthening (Therapeutic Exercise) bilateral;flexion;extension;marching while seated;LAQ (long arc quad);sitting;2 lb free weight;resistance band;green;10 repetitions;2 sets  -RG       Row Name 08/05/25 1535          Ankle (Therapeutic Exercise)    Ankle Strengthening (Therapeutic Exercise) bilateral;dorsiflexion;plantarflexion;sitting;10 repetitions;2 sets  -RG       Row Name 08/05/25 1531          Positioning and Restraints    Pre-Treatment Position sitting in chair/recliner  -RG     Post Treatment Position wheelchair  -RG     In Wheelchair notified nsg;sitting;with PT  -RG       Row Name 08/05/25 1535          Therapy Assessment/Plan (PT)    Patient's Goals For Discharge return home  -RG       Row Name 08/05/25 1530          Therapy Assessment/Plan (PT)    Rehab Potential/Prognosis (PT) good  -RG     Frequency of Treatment (PT) 5 times per week  -RG     Estimated Duration of Therapy  (PT) 2 weeks  -RG     Problem List (PT) balance;mobility;strength;pain  <tolerance to activity/endurance  -RG     Activity Limitations Related to Problem List (PT) unable to ambulate safely;unable to transfer safely  -RG       Row Name 08/05/25 1535          Therapy Plan Review/Discharge Plan (PT)    Anticipated Equipment Needs at Discharge (PT Eval) --  tbd  -RG     Anticipated Discharge Disposition (PT) home with outpatient therapy services  -RG       Row Name 08/05/25 1535          IRF PT Goals    Bed Mobility Goal Selection (PT-IRF) bed mobility, PT goal 1  -RG     Transfer Goal Selection (PT-IRF) transfers, PT goal 1  -RG     Gait (Walking Locomotion) Goal Selection (PT-IRF) gait, PT goal 1  -RG       Row Name 08/05/25 1538          Bed Mobility Goal 1 (PT-IRF)    Activity/Assistive Device (Bed Mobility Goal 1, PT-IRF) sit to supine/supine to sit  -RG     [place] Level (Bed Mobility Goal 1, PT-IRF) modified independence  -RG     Time Frame (Bed Mobility Goal 1, PT-IRF) by discharge  -RG       Row Name 08/05/25 1535          Transfer Goal 1 (PT-IRF)    Activity/Assistive Device (Transfer Goal 1, PT-IRF) sit-to-stand/stand-to-sit;bed-to-chair/chair-to-bed  -RG     [place] Level (Transfer Goal 1, PT-IRF) modified independence  -RG     Time Frame (Transfer Goal 1, PT-IRF) by discharge  -RG       Row Name 08/05/25 1538          Gait/Walking Locomotion Goal 1 (PT-IRF)    Activity/Assistive Device (Gait/Walking Locomotion Goal 1, PT-IRF) walker, rolling platform  -RG     Gait/Walking Locomotion Distance Goal 1 (PT-IRF) 300'  -RG     [place] Level (Gait/Walking Locomotion Goal 1, PT-IRF) modified independence  -RG     Time Frame (Gait/Walking Locomotion Goal 1, PT-IRF) by discharge  -RG               User Key  (r) = Recorded By, (t) = Taken By, (c) = Cosigned By      Initials Name Provider Type    Scott Hahn PTA Physical Therapist Assistant                  Wound 07/29/25 3933 Right anterior  greater trochanter Surgical (Active)   Dressing Appearance dry;intact 08/04/25 2123   Closure Unable to assess 08/04/25 2123   Base unable to visualize 08/04/25 2123       Wound 07/29/25 1843 Right distal wrist Surgical (Active)   Dressing Appearance dry;intact 08/05/25 1015   Closure Unable to assess 08/04/25 2123   Base unable to visualize 08/05/25 1015   Drainage Amount none 08/05/25 1015       Wound 07/29/25 1844 Right anterior temporal region (Active)   Dressing Appearance open to air 08/05/25 1015   Closure Approximated 08/05/25 1015   Base dry;scab 08/05/25 1015   Periwound pink 08/05/25 1015   Periwound Temperature warm 08/05/25 1015   Periwound Skin Turgor soft 08/05/25 1015   Drainage Amount none 08/05/25 1015   Dressing Care open to air 08/05/25 1015   Periwound Care dry periwound area maintained 08/04/25 2123       Wound 07/29/25 1849 Right lateral thigh (Active)   Dressing Appearance open to air 08/05/25 1015   Closure Steri strips 08/05/25 1015   Base dry;pink 08/05/25 1015   Periwound dry;pink 08/04/25 2123   Periwound Temperature warm 08/05/25 1015   Periwound Skin Turgor soft 08/04/25 2123   Edges rolled/closed 08/04/25 2123   Drainage Amount none 08/04/25 2123   Dressing Care open to air 08/05/25 1015     Physical Therapy Education       Title: PT OT SLP Therapies (Done)       Topic: Physical Therapy (Done)       Point: Mobility training (Done)       Learning Progress Summary            Patient Acceptance, E,D, VU,NR by RG at 8/5/2025 1541    Acceptance, E,TB, VU,DU by  at 8/4/2025 1518    Acceptance, E,D, VU,NR by RG at 8/4/2025 1515    Acceptance, E,D, VU,NR by RG at 8/2/2025 1514    Acceptance, E, VU,DU by  at 8/1/2025 1558    Acceptance, E,D, VU,NR by RG at 8/1/2025 1540    Acceptance, E,D, VU,NR by  at 7/31/2025 1504    Acceptance, E, VU,NR by LB at 7/30/2025 1205                      Point: Home exercise program (Done)       Learning Progress Summary            Patient Acceptance, E,D,  VU,NR by RG at 8/5/2025 1541    Acceptance, E,TB, VU,DU by HC at 8/4/2025 1518    Acceptance, E,D, VU,NR by RG at 8/4/2025 1515    Acceptance, E,D, VU,NR by RG at 8/2/2025 1514    Acceptance, E, VU,DU by HC at 8/1/2025 1558    Acceptance, E,D, VU,NR by RG at 8/1/2025 1540    Acceptance, E,D, VU,NR by RG at 7/31/2025 1504    Acceptance, E, VU,NR by LB at 7/30/2025 1205                      Point: Body mechanics (Done)       Learning Progress Summary            Patient Acceptance, E,D, VU,NR by RG at 8/5/2025 1541    Acceptance, E,TB, VU,DU by HC at 8/4/2025 1518    Acceptance, E,D, VU,NR by RG at 8/4/2025 1515    Acceptance, E,D, VU,NR by RG at 8/2/2025 1514    Acceptance, E, VU,DU by HC at 8/1/2025 1558    Acceptance, E,D, VU,NR by RG at 8/1/2025 1540    Acceptance, E,D, VU,NR by RG at 7/31/2025 1504    Acceptance, E, VU,NR by LB at 7/30/2025 1205                      Point: Precautions (Done)       Learning Progress Summary            Patient Acceptance, E,D, VU,NR by RG at 8/5/2025 1541    Acceptance, E,TB, VU,DU by HC at 8/4/2025 1518    Acceptance, E,D, VU,NR by RG at 8/4/2025 1515    Acceptance, E,D, VU,NR by RG at 8/2/2025 1514    Acceptance, E, VU,DU by HC at 8/1/2025 1558    Acceptance, E,D, VU,NR by RG at 8/1/2025 1540    Acceptance, E,D, VU,NR by RG at 7/31/2025 1504    Acceptance, E, VU,NR by LB at 7/30/2025 1205                                      User Key       Initials Effective Dates Name Provider Type Discipline    LB 06/16/21 -  Nicolette Pierce, PT Physical Therapist PT    RG 06/16/21 -  Scott Bhatia PTA Physical Therapist Assistant PT    HC 01/20/23 -  Maryam Wills PTA Physical Therapist Assistant PT                    PT Recommendation and Plan    Frequency of Treatment (PT): 5 times per week  Anticipated Equipment Needs at Discharge (PT Eval):  (tbd)                  Time Calculation:      PT Charges       Row Name 08/05/25 1541             Time Calculation    Start Time 1000   -RG      Stop Time 1045  -RG      Time Calculation (min) 45 min  -RG      PT Received On 08/05/25  -RG         Time Calculation- PT    Total Timed Code Minutes- PT 45 minute(s)  -RG                User Key  (r) = Recorded By, (t) = Taken By, (c) = Cosigned By      Initials Name Provider Type    Scott Hahn PTA Physical Therapist Assistant                    Therapy Charges for Today       Code Description Service Date Service Provider Modifiers Qty    51393934402 HC GAIT TRAINING EA 15 MIN 8/4/2025 Scott Bhatia PTA GP, CQ 1    66819063371 HC PT THERAPEUTIC ACT EA 15 MIN 8/4/2025 Scott Bhatia PTA GP, CQ 1    26029201901 HC PT THER PROC EA 15 MIN 8/4/2025 Scott Bhatia PTA GP, CQ 1    60974992006 HC GAIT TRAINING EA 15 MIN 8/5/2025 Scott Bhatia PTA GP, CQ 1    82907393519 HC PT THERAPEUTIC ACT EA 15 MIN 8/5/2025 Scott Bhatia PTA GP, CQ 1    65597137476 HC PT THER PROC EA 15 MIN 8/5/2025 Scott Bhatia PTA GP, CQ 1              PT G-Codes  AM-PAC 6 Clicks Score (PT): 17      Scott Bhatia PTA  8/5/2025

## 2025-08-05 NOTE — PROGRESS NOTES
Patient Name: Ninoska Stock  YOB: 1980  MRN: [id number]  Admission date: 7/29/2025  Reason for Encounter: Follow-up/Progress Note      [facility] Nutrition Progress Note       Nutrition Intervention Updates: 8/5/25      Subjective: Pt with good po intakes.  Continues to work with therapy.  No new weights since admission.         PO Diet: Diet: Regular/House; Fluid Consistency: Thin (IDDSI 0)   PO Supplements: None    PO Intake:  % x 3 days       Current nutrition support:    Nutrition support review:        Labs: Na: 133, glu: 104        GI Function:  WNL, last bm: 8/4        Brief Weight Review:    Wt Readings from Last 1 Encounters:   07/29/25 1752 112 kg (246 lb 11.2 oz)        Results from last 7 days   Lab Units 08/02/25  0450 07/30/25  0358   SODIUM mmol/L 133* 138   POTASSIUM mmol/L 3.8 4.6   CHLORIDE mmol/L 93* 101   CO2 mmol/L 24.7 25.3   BUN mg/dL 12.7 9.2   CREATININE mg/dL 0.62 0.64   CALCIUM mg/dL 8.4* 9.2   BILIRUBIN mg/dL  --  0.3   ALK PHOS U/L  --  107   ALT (SGPT) U/L  --  18   AST (SGOT) U/L  --  20   GLUCOSE mg/dL 104* 110*     Results from last 7 days   Lab Units 08/02/25  0450   PLATELETS 10*3/mm3 600*   HEMOGLOBIN g/dL 9.9*   HEMATOCRIT % 31.6*     Lab Results   Component Value Date    HGBA1C 5.8 (H) 07/17/2025       Electronically signed by:  Velasquez Mobley RD  RD remote in [place], [place]  08/05/25 18:56 EDT

## 2025-08-05 NOTE — SIGNIFICANT NOTE
08/05/25 3349   Plan   Plan Team conference held today.  Spoke to pt about how she is doing in therapy and plans for discharge on 8-7-25.  Discussed recommendations for home health PT/OT and she prefers to go to outpatient therapy.  Reviewed recommendation for rolling walker with right platform attachments.  Discussed options for outpatient PT/OT and DME.  Pt preers to go to PT [facility] and has no preference for DME provider.  Pt plans to return home with son and daughter.  Significant other Christian Cruz will be assist pt with needs.  Daughter will come to rehab for discharge and transportation home.  Will follow.   Patient/Family in Agreement with Plan yes

## 2025-08-05 NOTE — THERAPY TREATMENT NOTE
Inpatient Rehabilitation - Occupational Therapy Treatment Note     Nav     Patient Name: Ninoska Stock  : 1980  MRN: [id number]    Today's Date: 2025                 Admit Date: 2025       No diagnosis found.    Patient Active Problem List   Diagnosis    Shortness of breath    Immunization due    Abnormal CT of the chest    Cigarette nicotine dependence without complication    Hypersomnia    Trauma       Past Medical History:   Diagnosis Date    Anxiety     Asthma     Depression     Heart disease     Multiple sclerosis        Past Surgical History:   Procedure Laterality Date    CHOLECYSTECTOMY      FEMUR FRACTURE SURGERY      FOOT SURGERY      HYSTERECTOMY      KIDNEY SURGERY      TUBAL ABDOMINAL LIGATION      WRIST FRACTURE SURGERY               IRF OT ASSESSMENT FLOWSHEET (Last 12 Hours)       IRF OT Evaluation and Treatment       Row Name 25 1518          OT Time and Intention    Document Type daily treatment  -TM     Mode of Treatment occupational therapy  -TM     Patient Effort good  -TM     Symptoms Noted During/After Treatment none  -TM       Row Name 25 1518          General Information    General Observations of Patient Pt agreeable for therapy.  -TM     Existing Precautions/Restrictions fall;cervical collar;brace on at all times;lifting;spinal;other (see comments)  OVIDIO NWB  -TM       Row Name 25 1518          Cognition/Psychosocial    Orientation Status (Cognition) oriented x 4  -TM     Follows Commands (Cognition) WFL  -TM       Row Name 25 1518          Grooming    [place] Level (Grooming) set up  -TM     Position (Grooming) supported sitting  -TM       Row Name 25 1518          Toileting    [place] Level (Toileting) contact guard assist;verbal cues  -TM     Assistive Device Use (Toileting) raised toilet seat;grab bar/safety frame  -TM     Position (Toileting) supported sitting  -TM       Row Name 25 1518          Toilet Transfer     [place] Level (Toilet Transfer) contact guard;verbal cues  -     Assistive Device (Toilet Transfer) wheelchair;raised toilet seat;grab bars/safety frame  -TM       Row Name 08/05/25 1518          Motor Skills    Motor Skills coordination;functional endurance;motor control/coordination interventions  -     Motor Control/Coordination Interventions therapeutic exercise/ROM;fine motor manipulation/dexterity activities;gross motor coordination activities;other (see comments)  LUE ther ex/act, GMC/FMC; light RUE hand/fingers ther activities range of motion as tolerated  -               User Key  (r) = Recorded By, (t) = Taken By, (c) = Cosigned By      Initials Name Effective Dates    TM Humera Perry OT 06/16/21 -                      Occupational Therapy Education       Title: PT OT SLP Therapies (Done)       Topic: Occupational Therapy (Done)       Point: ADL training (Done)       Learning Progress Summary            Patient Acceptance, E,D, VU,NR by TM at 8/5/2025 1517    Acceptance, E,D, VU,NR by TM at 8/4/2025 1545                      Point: Precautions (Done)       Learning Progress Summary            Patient Acceptance, E,D, VU,NR by TM at 8/5/2025 1517    Acceptance, E,D, VU,NR by TM at 8/4/2025 1545                                      User Key       Initials Effective Dates Name Provider Type Discipline     06/16/21 -  Humera Perry OT Occupational Therapist OT                        OT Recommendation and Plan                         Time Calculation:      Time Calculation- OT       Row Name 08/05/25 1518             Time Calculation- OT    OT Start Time 1325  -TM      OT Stop Time 1410  -TM      OT Time Calculation (min) 45 min  -TM      Total Timed Code Minutes- OT 45 minute(s)  -TM      OT Non-Billable Time (min) 15 min  -TM                User Key  (r) = Recorded By, (t) = Taken By, (c) = Cosigned By      Initials Name Provider Type    TM Humera Perry OT  Occupational Therapist                  Therapy Charges for Today       Code Description Service Date Service Provider Modifiers Qty    70784285422 HC OT SELF CARE/MGMT/TRAIN EA 15 MIN 8/4/2025 Humera Perry, OT GO 1    50419273966 HC OT THERAPEUTIC ACT EA 15 MIN 8/4/2025 Humera Perry, MAGNOLIA GO 2    26897936843 HC OT SELF CARE/MGMT/TRAIN EA 15 MIN 8/5/2025 Humera Perry, OT GO 1    90468772590 HC OT THERAPEUTIC ACT EA 15 MIN 8/5/2025 Humera Perry OT GO 2                     Humera Perry OT  8/5/2025

## 2025-08-05 NOTE — THERAPY TREATMENT NOTE
Inpatient Rehabilitation - Occupational Therapy Treatment Note     Nav     Patient Name: Ninoska Stock  : 1980  MRN: [id number]    Today's Date: 2025                 Admit Date: 2025       No diagnosis found.    Patient Active Problem List   Diagnosis    Shortness of breath    Immunization due    Abnormal CT of the chest    Cigarette nicotine dependence without complication    Hypersomnia    Trauma       Past Medical History:   Diagnosis Date    Anxiety     Asthma     Depression     Heart disease     Multiple sclerosis        Past Surgical History:   Procedure Laterality Date    CHOLECYSTECTOMY      FEMUR FRACTURE SURGERY      FOOT SURGERY      HYSTERECTOMY      KIDNEY SURGERY      TUBAL ABDOMINAL LIGATION      WRIST FRACTURE SURGERY               IRF OT ASSESSMENT FLOWSHEET (Last 12 Hours)       IRF OT Evaluation and Treatment       Row Name 25 1529 25 1518       OT Time and Intention    Document Type daily treatment  -KP daily treatment  -TM    Mode of Treatment individual therapy;occupational therapy  -KP occupational therapy  -TM    Total Minutes, Occupational Therapy 45  -KP --    Patient Effort good  -KP good  -TM    Symptoms Noted During/After Treatment none  -KP none  -TM      Row Name 25 1529 25 1518       General Information    Patient Profile Reviewed yes  -KP --    General Observations of Patient Patient agreeable to therapy with no complaints.  -KP Pt agreeable for therapy.  -TM    Existing Precautions/Restrictions fall;cervical collar;brace on at all times;lifting;spinal  -KP fall;cervical collar;brace on at all times;lifting;spinal;other (see comments)  OVIDIO SEEB  -TM      Row Name 25 1529          Pain    Pretreatment Pain Rating 0/10 - no pain  -KP     Posttreatment Pain Rating 0/10 - no pain  -KP       Row Name 25 1529 25 1518       Cognition/Psychosocial    Affect/Mental Status (Cognition) WFL  - --    Orientation Status (Cognition)  oriented x 4  -KP oriented x 4  -TM    Follows Commands (Cognition) WFL  -KP WFL  -    Personal Safety Interventions fall prevention program maintained;gait belt;nonskid shoes/slippers when out of bed  - --      Row Name 08/05/25 1529          Lower Body Dressing    Ranger Level (Lower Body Dressing) socks;don;set up  -     Assistive Device Use (Lower Body Dressing) sock-aid  -KP     Position (Lower Body Dressing) edge of bed sitting  -       Row Name 08/05/25 1518          Grooming    Ranger Level (Grooming) set up  -     Position (Grooming) supported sitting  -       Row Name 08/05/25 1518          Toileting    Ranger Level (Toileting) contact guard assist;verbal cues  -     Assistive Device Use (Toileting) raised toilet seat;grab bar/safety frame  -     Position (Toileting) supported sitting  -       Row Name 08/05/25 1529          Chair-Bed Transfer    Chair-Bed Ranger (Transfers) verbal cues;nonverbal cues (demo/gesture);contact guard  -       Row Name 08/05/25 1518          Toilet Transfer    Ranger Level (Toilet Transfer) contact guard;verbal cues  -     Assistive Device (Toilet Transfer) wheelchair;raised toilet seat;grab bars/safety frame  -       Row Name 08/05/25 1529 08/05/25 1518       Motor Skills    Motor Skills -- coordination;functional endurance;motor control/coordination interventions  -    Functional Endurance fair  -KP --    Motor Control/Coordination Interventions therapeutic exercise/ROM  BUE PRE X10 minutes X2, tabletop functional reach activity with pegs and clothespins  - therapeutic exercise/ROM;fine motor manipulation/dexterity activities;gross motor coordination activities;other (see comments)  LUE ther ex/act, GMC/FMC; light RUE hand/fingers ther activities range of motion as tolerated  -      Row Name 08/05/25 1529          Positioning and Restraints    Pre-Treatment Position in bed  -     Post Treatment Position bed  -      In Bed fowlers;call light within reach;encouraged to call for assist;exit alarm on  -KP       Row Name 08/05/25 1529          Daily Progress Summary (OT)    Overall Progress Toward Functional Goals (OT) progressing toward functional goals as expected  -               User Key  (r) = Recorded By, (t) = Taken By, (c) = Cosigned By      Initials Name Effective Dates     Humera Perry, MAGNOLIA 06/16/21 -      Maryann Felix OT 06/16/21 -                      Occupational Therapy Education       Title: PT OT SLP Therapies (Done)       Topic: Occupational Therapy (Done)       Point: ADL training (Done)       Learning Progress Summary            Patient Acceptance, E,D, VU,NR by  at 8/5/2025 1517    Acceptance, E,D, VU,NR by TM at 8/4/2025 1545                      Point: Precautions (Done)       Learning Progress Summary            Patient Acceptance, E,D, VU,NR by TM at 8/5/2025 1517    Acceptance, E,D, VU,NR by TM at 8/4/2025 1545                                      User Key       Initials Effective Dates Name Provider Type Discipline     06/16/21 -  Humera Perry, OT Occupational Therapist OT                        OT Recommendation and Plan    Planned Therapy Interventions (OT): activity tolerance training, adaptive equipment training, BADL retraining, functional balance retraining, occupation/activity based interventions, passive ROM/stretching, patient/caregiver education/training, ROM/therapeutic exercise, strengthening exercise, transfer/mobility retraining       Daily Progress Summary (OT)  Overall Progress Toward Functional Goals (OT): progressing toward functional goals as expected            Time Calculation:      Time Calculation- OT       Row Name 08/05/25 1533 08/05/25 1531 08/05/25 1518       Time Calculation- OT    OT Start Time 1415  -KP 0930  -KP 1325  -TM    OT Stop Time 1430  -KP 1000  -KP 1410  -TM    OT Time Calculation (min) 15 min  -KP 30 min  -KP 45 min  -TM    Total  Timed Code Minutes- OT 15 minute(s)  -KP 30 minute(s)  -KP 45 minute(s)  -TM    OT Non-Billable Time (min) -- -- 15 min  -TM    OT Received On 08/05/25  - 08/05/25  - --              User Key  (r) = Recorded By, (t) = Taken By, (c) = Cosigned By      Initials Name Provider Type    TM Humera Perry OT Occupational Therapist     Maryann Felix OT Occupational Therapist                  Therapy Charges for Today       Code Description Service Date Service Provider Modifiers Qty    24596825355 HC OT THERAPEUTIC ACT EA 15 MIN 8/4/2025 Maryann Felix OT GO 1    28160715925 HC OT THER PROC EA 15 MIN 8/4/2025 Maryann Felix OT GO 2    86785080043 HC OT THERAPEUTIC ACT EA 15 MIN 8/5/2025 Maryann Felix OT GO 1    61255332069 HC OT THER PROC EA 15 MIN 8/5/2025 Maryann Felix OT GO 2                     Maryann Felix OT  8/5/2025
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
## 2025-08-05 NOTE — THERAPY TREATMENT NOTE
Inpatient Rehabilitation - Physical Therapy Treatment Note        Nav     Patient Name: Ninoska Stock  : 1980  MRN: [id number]    Today's Date: 2025                    Admit Date: 2025      Visit Dx:   No diagnosis found.    Patient Active Problem List   Diagnosis    Shortness of breath    Immunization due    Abnormal CT of the chest    Cigarette nicotine dependence without complication    Hypersomnia    Trauma       Past Medical History:   Diagnosis Date    Anxiety     Asthma     Depression     Heart disease     Multiple sclerosis        Past Surgical History:   Procedure Laterality Date    CHOLECYSTECTOMY      FEMUR FRACTURE SURGERY      FOOT SURGERY      HYSTERECTOMY      KIDNEY SURGERY      TUBAL ABDOMINAL LIGATION      WRIST FRACTURE SURGERY         PT ASSESSMENT (Last 12 Hours)       IRF PT Evaluation and Treatment       Row Name 25 1546 25 1535       PT Time and Intention    Document Type daily treatment  -HC daily treatment  -RG    Mode of Treatment individual therapy;physical therapy  -HC individual therapy;physical therapy  -RG    Patient/Family/Caregiver Comments/Observations Pt and RN in agreement for PT for second session. Pt walked 80' x 3 with PRW CGA. Standing exercises in // bars to tolerance.  - Pt and nursing in agreement for skilled PT on this date.  -RG      Row Name 25 1546 25 1535       General Information    Existing Precautions/Restrictions fall;cervical collar;brace on at all times;lifting  -HC fall;cervical collar;brace on at all times;lifting  -RG      Row Name 25 1546 25 1535       Pain Scale: FACES Pre/Post-Treatment    Pain: FACES Scale, Pretreatment 4-->hurts little more  -HC 4-->hurts little more  -RG    Posttreatment Pain Rating 4-->hurts little more  -HC 4-->hurts little more  -RG      Row Name 25 1546 25 1535       Cognition/Psychosocial    Affect/Mental Status (Cognition) WFL  -HC WFL  -RG    Follows  Commands (Cognition) WFL  -HC WFL  -    Personal Safety Interventions fall prevention program maintained;gait belt;nonskid shoes/slippers when out of bed;supervised activity  - fall prevention program maintained;gait belt;nonskid shoes/slippers when out of bed  -      Row Name 08/05/25 1546 08/05/25 1535       Mobility    Extremity Weight-bearing Status left lower extremity;right lower extremity;left upper extremity;right upper extremity  - left lower extremity;right lower extremity;left upper extremity;right upper extremity  -RG    Left Upper Extremity (Weight-bearing Status) weight-bearing as tolerated (WBAT)  -HC weight-bearing as tolerated (WBAT)  -RG    Right Upper Extremity (Weight-bearing Status) non weight-bearing (NWB)  can use a platform walker  -HC non weight-bearing (NWB)  can use a platform walker  -RG    Left Lower Extremity (Weight-bearing Status) weight-bearing as tolerated (WBAT)  -HC weight-bearing as tolerated (WBAT)  -RG    Right Lower Extremity (Weight-bearing Status) weight-bearing as tolerated (WBAT)  -HC weight-bearing as tolerated (WBAT)  -      Row Name 08/05/25 1546 08/05/25 1535       Bed Mobility    Bed Mobility supine-sit;sit-supine;supine-sit-supine  - supine-sit;sit-supine;supine-sit-supine  -    Comment, (Bed Mobility) [facility]  - --      Row Name 08/05/25 1546 08/05/25 1535       Transfer Assessment/Treatment    Transfers bed-chair transfer;chair-bed transfer;sit-stand transfer;stand-sit transfer;stand pivot/stand step transfer;toilet transfer  - bed-chair transfer;chair-bed transfer;sit-stand transfer;stand-sit transfer;stand pivot/stand step transfer;toilet transfer  -      Row Name 08/05/25 1546 08/05/25 1535       Chair-Bed Transfer    Chair-Bed [place] (Transfers) verbal cues;nonverbal cues (demo/gesture);contact guard  - verbal cues;nonverbal cues (demo/gesture);contact guard  -      Row Name 08/05/25 1546 08/05/25 1535       Sit-Stand Transfer     Sit-Stand [place] (Transfers) contact guard;minimum assist (75% patient effort);verbal cues;nonverbal cues (demo/gesture)  - contact guard;minimum assist (75% patient effort);verbal cues;nonverbal cues (demo/gesture)  -    Assistive Device (Sit-Stand Transfers) wheelchair;walker, rolling platform  -HC wheelchair;walker, rolling platform  -RG      Row Name 08/05/25 1546 08/05/25 1535       Stand-Sit Transfer    Stand-Sit [place] (Transfers) contact guard;minimum assist (75% patient effort);verbal cues;nonverbal cues (demo/gesture)  - contact guard;minimum assist (75% patient effort);verbal cues;nonverbal cues (demo/gesture)  -    Assistive Device (Stand-Sit Transfers) wheelchair;walker, rolling platform  - wheelchair;walker, rolling platform  -      Row Name 08/05/25 1546 08/05/25 1535       Stand Pivot/Stand Step Transfer    Stand Pivot/Stand Step [place] (Transfers) verbal cues;nonverbal cues (demo/gesture);contact guard;minimum assist (75% patient effort)  - verbal cues;nonverbal cues (demo/gesture);contact guard;minimum assist (75% patient effort)  -    Assistive Device (Stand Pivot Stand Step Transfer) --  bed rail  - --  bed rail  -      Row Name 08/05/25 1546 08/05/25 1535       Toilet Transfer    Type (Toilet Transfer) stand pivot/stand step  - stand pivot/stand step  -    Boss Level (Toilet Transfer) verbal cues;nonverbal cues (demo/gesture);supervision;contact guard  - verbal cues;nonverbal cues (demo/gesture);supervision;contact guard  -      Row Name 08/05/25 1546 08/05/25 1535       Gait/Stairs (Locomotion)    Boss Level (Gait) 1 person to manage equipment;verbal cues;nonverbal cues (demo/gesture);contact guard  - 1 person to manage equipment;verbal cues;nonverbal cues (demo/gesture);contact guard  -    Assistive Device (Gait) walker, rolling platform  - walker, rolling platform  -    Patient was able to Ambulate -- yes  -RG    Distance  in Feet (Gait) 80  x 3  -HC --  80 x 2  -RG    Pattern (Gait) step-to  -HC step-to  -RG    Deviations/Abnormal Patterns (Gait) magan decreased;gait speed decreased;stride length decreased  -HC magan decreased;gait speed decreased;stride length decreased  -RG    Bilateral Gait Deviations weight shift ability decreased  -HC weight shift ability decreased  -RG    Comment, (Gait/Stairs) -- rest break as needed  -RG      Row Name 08/05/25 1546 08/05/25 1535       Safety Issues/Impairments Affecting Functional Mobility    Impairments Affecting Function (Mobility) balance;endurance/activity tolerance;pain;strength  - balance;endurance/activity tolerance;pain;strength  -RG      Row Name 08/05/25 1546          Motor Skills    Therapeutic Exercise other (see comments)  // bars: March, hip abd, calf raise, mini squats, sit to stand, bean bag toss/  with reacher, HS curls.  -       Row Name 08/05/25 1535          Hip (Therapeutic Exercise)    Hip Strengthening (Therapeutic Exercise) bilateral;flexion;aBduction;aDduction;marching while seated;sitting;2 lb free weight;resistance band;green;10 repetitions;2 sets  -RG       Row Name 08/05/25 1535          Knee (Therapeutic Exercise)    Knee Strengthening (Therapeutic Exercise) bilateral;flexion;extension;marching while seated;LAQ (long arc quad);sitting;2 lb free weight;resistance band;green;10 repetitions;2 sets  -RG       Row Name 08/05/25 1535          Ankle (Therapeutic Exercise)    Ankle Strengthening (Therapeutic Exercise) bilateral;dorsiflexion;plantarflexion;sitting;10 repetitions;2 sets  -       Row Name 08/05/25 1546 08/05/25 1535       Positioning and Restraints    Pre-Treatment Position other (comment)  WC from PT  -HC sitting in chair/recliner  -RG    Post Treatment Position wheelchair  -HC wheelchair  -RG    In Wheelchair sitting;call light within reach;encouraged to call for assist;exit alarm on  -HC notified nsg;sitting;with PT  -RG      Row Name  08/05/25 1546 08/05/25 1535       Therapy Assessment/Plan (PT)    Patient's Goals For Discharge return home  -HC return home  -RG      Row Name 08/05/25 1546 08/05/25 1535       Therapy Assessment/Plan (PT)    Rehab Potential/Prognosis (PT) good  -HC good  -RG    Frequency of Treatment (PT) 5 times per week  -HC 5 times per week  -RG    Estimated Duration of Therapy (PT) 2 weeks  -HC 2 weeks  -RG    Problem List (PT) balance;mobility;strength;pain  <tolerance to activity/endurance  -HC balance;mobility;strength;pain  <tolerance to activity/endurance  -RG    Activity Limitations Related to Problem List (PT) unable to ambulate safely;unable to transfer safely  -HC unable to ambulate safely;unable to transfer safely  -RG      Row Name 08/05/25 1546 08/05/25 1535       Therapy Plan Review/Discharge Plan (PT)    Anticipated Equipment Needs at Discharge (PT Eval) --  tbd  -HC --  tbd  -RG    Anticipated Discharge Disposition (PT) home with outpatient therapy services  -HC home with outpatient therapy services  -RG      Row Name 08/05/25 1546 08/05/25 1535       IRF PT Goals    Bed Mobility Goal Selection (PT-IRF) bed mobility, PT goal 1  -HC bed mobility, PT goal 1  -RG    Transfer Goal Selection (PT-IRF) transfers, PT goal 1  -HC transfers, PT goal 1  -RG    Gait (Walking Locomotion) Goal Selection (PT-IRF) gait, PT goal 1  -HC gait, PT goal 1  -RG      Row Name 08/05/25 1546 08/05/25 1535       Bed Mobility Goal 1 (PT-IRF)    Activity/Assistive Device (Bed Mobility Goal 1, PT-IRF) sit to supine/supine to sit  -HC sit to supine/supine to sit  -RG    [place] Level (Bed Mobility Goal 1, PT-IRF) modified independence  -HC modified independence  -RG    Time Frame (Bed Mobility Goal 1, PT-IRF) by discharge  -HC by discharge  -RG      Row Name 08/05/25 1546 08/05/25 1535       Transfer Goal 1 (PT-IRF)    Activity/Assistive Device (Transfer Goal 1, PT-IRF) sit-to-stand/stand-to-sit;bed-to-chair/chair-to-bed  -  sit-to-stand/stand-to-sit;bed-to-chair/chair-to-bed  -RG    [place] Level (Transfer Goal 1, PT-IRF) modified independence  -HC modified independence  -RG    Time Frame (Transfer Goal 1, PT-IRF) by discharge  -HC by discharge  -RG      Row Name 08/05/25 1546 08/05/25 1535       Gait/Walking Locomotion Goal 1 (PT-IRF)    Activity/Assistive Device (Gait/Walking Locomotion Goal 1, PT-IRF) walker, rolling platform  -HC walker, rolling platform  -RG    Gait/Walking Locomotion Distance Goal 1 (PT-IRF) 300'  -'  -RG    [place] Level (Gait/Walking Locomotion Goal 1, PT-IRF) modified independence  -HC modified independence  -RG    Time Frame (Gait/Walking Locomotion Goal 1, PT-IRF) by discharge  -HC by discharge  -RG              User Key  (r) = Recorded By, (t) = Taken By, (c) = Cosigned By      Initials Name Provider Type    RG Scott Bhatia, MAKENZIE Physical Therapist Assistant     Maryam Wills PTA Physical Therapist Assistant                  Wound 07/29/25 1842 Right anterior greater trochanter Surgical (Active)   Dressing Appearance dry;intact 08/04/25 2123   Closure Unable to assess 08/04/25 2123   Base unable to visualize 08/04/25 2123       Wound 07/29/25 1843 Right distal wrist Surgical (Active)   Dressing Appearance dry;intact 08/05/25 1015   Closure Unable to assess 08/04/25 2123   Base unable to visualize 08/05/25 1015   Drainage Amount none 08/05/25 1015       Wound 07/29/25 1844 Right anterior temporal region (Active)   Dressing Appearance open to air 08/05/25 1015   Closure Approximated 08/05/25 1015   Base dry;scab 08/05/25 1015   Periwound pink 08/05/25 1015   Periwound Temperature warm 08/05/25 1015   Periwound Skin Turgor soft 08/05/25 1015   Drainage Amount none 08/05/25 1015   Dressing Care open to air 08/05/25 1015   Periwound Care dry periwound area maintained 08/04/25 2123       Wound 07/29/25 1849 Right lateral thigh (Active)   Dressing Appearance open to air 08/05/25 1015    Closure Steri strips 08/05/25 1015   Base dry;pink 08/05/25 1015   Periwound dry;pink 08/04/25 2123   Periwound Temperature warm 08/05/25 1015   Periwound Skin Turgor soft 08/04/25 2123   Edges rolled/closed 08/04/25 2123   Drainage Amount none 08/04/25 2123   Dressing Care open to air 08/05/25 1015     Physical Therapy Education       Title: PT OT SLP Therapies (Done)       Topic: Physical Therapy (Done)       Point: Mobility training (Done)       Learning Progress Summary            Patient Acceptance, E,TB, VU,DU by HC at 8/5/2025 1555    Acceptance, E,D, VU,NR by RG at 8/5/2025 1541    Acceptance, E,TB, VU,DU by HC at 8/4/2025 1518    Acceptance, E,D, VU,NR by RG at 8/4/2025 1515    Acceptance, E,D, VU,NR by RG at 8/2/2025 1514    Acceptance, E, VU,DU by HC at 8/1/2025 1558    Acceptance, E,D, VU,NR by RG at 8/1/2025 1540    Acceptance, E,D, VU,NR by RG at 7/31/2025 1504    Acceptance, E, VU,NR by LB at 7/30/2025 1205                      Point: Home exercise program (Done)       Learning Progress Summary            Patient Acceptance, E,TB, VU,DU by HC at 8/5/2025 1555    Acceptance, E,D, VU,NR by RG at 8/5/2025 1541    Acceptance, E,TB, VU,DU by HC at 8/4/2025 1518    Acceptance, E,D, VU,NR by RG at 8/4/2025 1515    Acceptance, E,D, VU,NR by RG at 8/2/2025 1514    Acceptance, E, VU,DU by HC at 8/1/2025 1558    Acceptance, E,D, VU,NR by RG at 8/1/2025 1540    Acceptance, E,D, VU,NR by RG at 7/31/2025 1504    Acceptance, E, VU,NR by LB at 7/30/2025 1205                      Point: Body mechanics (Done)       Learning Progress Summary            Patient Acceptance, E,TB, VU,DU by HC at 8/5/2025 1555    Acceptance, E,D, VU,NR by RG at 8/5/2025 1541    Acceptance, E,TB, VU,DU by HC at 8/4/2025 1518    Acceptance, E,D, VU,NR by RG at 8/4/2025 1515    Acceptance, E,D, VU,NR by RG at 8/2/2025 1514    Acceptance, E, VU,DU by HC at 8/1/2025 1558    Acceptance, E,D, VU,NR by RG at 8/1/2025 1540    Acceptance, E,D,  VU,NR by RG at 7/31/2025 1504    Acceptance, E, VU,NR by LB at 7/30/2025 1205                      Point: Precautions (Done)       Learning Progress Summary            Patient Acceptance, E,TB, VU,DU by HC at 8/5/2025 1555    Acceptance, E,D, VU,NR by RG at 8/5/2025 1541    Acceptance, E,TB, VU,DU by HC at 8/4/2025 1518    Acceptance, E,D, VU,NR by RG at 8/4/2025 1515    Acceptance, E,D, VU,NR by RG at 8/2/2025 1514    Acceptance, E, VU,DU by HC at 8/1/2025 1558    Acceptance, E,D, VU,NR by RG at 8/1/2025 1540    Acceptance, E,D, VU,NR by RG at 7/31/2025 1504    Acceptance, E, VU,NR by LB at 7/30/2025 1205                                      User Key       Initials Effective Dates Name Provider Type Discipline     06/16/21 -  Nicolette Pierce, PT Physical Therapist PT     06/16/21 -  Scott Bhatia PTA Physical Therapist Assistant PT     01/20/23 -  Maryam Wills PTA Physical Therapist Assistant PT                    PT Recommendation and Plan    Frequency of Treatment (PT): 5 times per week  Anticipated Equipment Needs at Discharge (PT Eval):  (tbd)                  Time Calculation:      PT Charges       Row Name 08/05/25 1556 08/05/25 1541          Time Calculation    Start Time 1045  - 1000  -     Stop Time 1130  -HC 1045  -     Time Calculation (min) 45 min  - 45 min  -     PT Received On 08/05/25  - 08/05/25  -        Time Calculation- PT    Total Timed Code Minutes- PT 45 minute(s)  -HC 45 minute(s)  -               User Key  (r) = Recorded By, (t) = Taken By, (c) = Cosigned By      Initials Name Provider Type    RG Scott Bhatia PTA Physical Therapist Assistant     Maryam Wills PTA Physical Therapist Assistant                    Therapy Charges for Today       Code Description Service Date Service Provider Modifiers Qty    34884951936  GAIT TRAINING EA 15 MIN 8/4/2025 Maryam Wills PTA GP, CQ 1    [id number]  PT THER PROC EA 15 MIN 8/4/2025  Maryam Wills, MAKENZIE GP, CQ 1    55530722701 HC PT THERAPEUTIC ACT EA 15 MIN 8/4/2025 Maryam Wills, MAKENZIE GP, CQ 1    71807193240 HC GAIT TRAINING EA 15 MIN 8/5/2025 Maryam Wills, MAKENZIE GP, CQ 1    03077912498 HC PT THER PROC EA 15 MIN 8/5/2025 aMryam Wills, MAKENZIE GP, CQ 1    94650354875 HC PT THERAPEUTIC ACT EA 15 MIN 8/5/2025 Maryam Wills, MAKENZIE GP, CQ 1              PT G-Codes  AM-PAC 6 Clicks Score (PT): 17      Maryam Wills PTA  8/5/2025

## 2025-08-06 PROCEDURE — 94761 N-INVAS EAR/PLS OXIMETRY MLT: CPT

## 2025-08-06 PROCEDURE — 97530 THERAPEUTIC ACTIVITIES: CPT

## 2025-08-06 PROCEDURE — 97116 GAIT TRAINING THERAPY: CPT

## 2025-08-06 PROCEDURE — 97110 THERAPEUTIC EXERCISES: CPT

## 2025-08-06 PROCEDURE — 97535 SELF CARE MNGMENT TRAINING: CPT

## 2025-08-06 PROCEDURE — 25010000002 HEPARIN (PORCINE) PER 1000 UNITS: Performed by: INTERNAL MEDICINE

## 2025-08-06 PROCEDURE — 94799 UNLISTED PULMONARY SVC/PX: CPT

## 2025-08-06 PROCEDURE — 94664 DEMO&/EVAL PT USE INHALER: CPT

## 2025-08-06 RX ORDER — MODAFINIL 100 MG/1
200 TABLET ORAL DAILY
Status: DISCONTINUED | OUTPATIENT
Start: 2025-08-06 | End: 2025-08-07 | Stop reason: HOSPADM

## 2025-08-06 RX ADMIN — OXYCODONE HYDROCHLORIDE 10 MG: 10 TABLET ORAL at 08:22

## 2025-08-06 RX ADMIN — GABAPENTIN 800 MG: 400 CAPSULE ORAL at 14:18

## 2025-08-06 RX ADMIN — Medication 10 MG: at 20:12

## 2025-08-06 RX ADMIN — VERAPAMIL HYDROCHLORIDE 120 MG: 120 TABLET, FILM COATED, EXTENDED RELEASE ORAL at 08:22

## 2025-08-06 RX ADMIN — METHOCARBAMOL TABLETS 500 MG: 500 TABLET, COATED ORAL at 17:43

## 2025-08-06 RX ADMIN — GABAPENTIN 800 MG: 400 CAPSULE ORAL at 21:22

## 2025-08-06 RX ADMIN — DULOXETINE 60 MG: 60 CAPSULE, DELAYED RELEASE ORAL at 08:22

## 2025-08-06 RX ADMIN — BUSPIRONE HYDROCHLORIDE 10 MG: 10 TABLET ORAL at 08:22

## 2025-08-06 RX ADMIN — GABAPENTIN 800 MG: 400 CAPSULE ORAL at 05:19

## 2025-08-06 RX ADMIN — ACETAMINOPHEN 1000 MG: 500 TABLET, FILM COATED ORAL at 05:19

## 2025-08-06 RX ADMIN — DULOXETINE 60 MG: 60 CAPSULE, DELAYED RELEASE ORAL at 20:12

## 2025-08-06 RX ADMIN — MODAFINIL 200 MG: 100 TABLET ORAL at 11:57

## 2025-08-06 RX ADMIN — BUSPIRONE HYDROCHLORIDE 10 MG: 10 TABLET ORAL at 20:12

## 2025-08-06 RX ADMIN — OXYCODONE HYDROCHLORIDE 10 MG: 10 TABLET ORAL at 20:12

## 2025-08-06 RX ADMIN — Medication 1000 MCG: at 08:22

## 2025-08-06 RX ADMIN — BUDESONIDE AND FORMOTEROL FUMARATE DIHYDRATE 2 PUFF: 160; 4.5 AEROSOL RESPIRATORY (INHALATION) at 06:28

## 2025-08-06 RX ADMIN — OXYCODONE HYDROCHLORIDE 10 MG: 10 TABLET ORAL at 14:17

## 2025-08-06 RX ADMIN — HEPARIN SODIUM 5000 UNITS: 5000 INJECTION INTRAVENOUS; SUBCUTANEOUS at 21:22

## 2025-08-06 RX ADMIN — METHOCARBAMOL TABLETS 500 MG: 500 TABLET, COATED ORAL at 08:27

## 2025-08-06 RX ADMIN — CARIPRAZINE 6 MG: 3 CAPSULE, GELATIN COATED ORAL at 08:22

## 2025-08-06 RX ADMIN — HEPARIN SODIUM 5000 UNITS: 5000 INJECTION INTRAVENOUS; SUBCUTANEOUS at 14:18

## 2025-08-06 RX ADMIN — SENNOSIDES AND DOCUSATE SODIUM 2 TABLET: 50; 8.6 TABLET ORAL at 08:22

## 2025-08-06 RX ADMIN — SENNOSIDES AND DOCUSATE SODIUM 2 TABLET: 50; 8.6 TABLET ORAL at 20:12

## 2025-08-06 RX ADMIN — BUDESONIDE AND FORMOTEROL FUMARATE DIHYDRATE 2 PUFF: 160; 4.5 AEROSOL RESPIRATORY (INHALATION) at 18:37

## 2025-08-06 RX ADMIN — HYDROXYZINE HYDROCHLORIDE 25 MG: 25 TABLET, FILM COATED ORAL at 20:12

## 2025-08-06 RX ADMIN — MELOXICAM 7.5 MG: 7.5 TABLET ORAL at 08:21

## 2025-08-06 RX ADMIN — HYDROCHLOROTHIAZIDE 25 MG: 25 TABLET ORAL at 08:22

## 2025-08-06 RX ADMIN — HEPARIN SODIUM 5000 UNITS: 5000 INJECTION INTRAVENOUS; SUBCUTANEOUS at 05:19

## 2025-08-06 NOTE — SIGNIFICANT NOTE
08/06/25 1525   Plan   Plan Spoke to pt about outpatient PT appointment on 8-11-25 at 2:00 pm at [facility]; OT to be scheduled when available and [employer] [facility] of [facility] to provide RW with right platform attachment.  Pt signed outpatient therapy and DME preference forms.   Patient/Family in Agreement with Plan yes

## 2025-08-06 NOTE — PROGRESS NOTES
Problems/Goals  Skin Integrity (Body Function Structure)  Current Status: risk for impaired skin integrity  Long Term Goals  07/29/2025 06:04 PM - Active  no skin breakdown  Potential for Injury (Safety)  Current Status: risk for falls  Short Term Goals  07/29/2025 06:06 PM - Active  no falls  Long Term Goals  07/29/2025 06:05 PM - Active  no falls    Signed by: Pratima Ruiz, Nurse

## 2025-08-06 NOTE — THERAPY TREATMENT NOTE
Inpatient Rehabilitation - Physical Therapy Treatment Note        Nav     Patient Name: Ninoska Stock  : 1980  MRN: [id number]    Today's Date: 2025                    Admit Date: 2025      Visit Dx:   No diagnosis found.    Patient Active Problem List   Diagnosis    Shortness of breath    Immunization due    Abnormal CT of the chest    Cigarette nicotine dependence without complication    Hypersomnia    Trauma       Past Medical History:   Diagnosis Date    Anxiety     Asthma     Depression     Heart disease     Multiple sclerosis        Past Surgical History:   Procedure Laterality Date    CHOLECYSTECTOMY      FEMUR FRACTURE SURGERY      FOOT SURGERY      HYSTERECTOMY      KIDNEY SURGERY      TUBAL ABDOMINAL LIGATION      WRIST FRACTURE SURGERY         PT ASSESSMENT (Last 12 Hours)       IRF PT Evaluation and Treatment       Row Name 25 1540 25 1313       PT Time and Intention    Document Type daily treatment  -RG daily treatment  -LL    Mode of Treatment individual therapy;physical therapy  -RG individual therapy;physical therapy  -LL    Patient/Family/Caregiver Comments/Observations Pt and nursing in agreement for skilled PT on this date.  -RG Patient had no new c/o this date and was agreeable to PT participation.  -LL      Row Name 25 1540 25 1313       General Information    Existing Precautions/Restrictions fall;cervical collar;brace on at all times;lifting  -RG fall;cervical collar;brace on at all times;lifting  -LL      Row Name 25 1540 25 1313       Pain Scale: FACES Pre/Post-Treatment    Pain: FACES Scale, Pretreatment 2-->hurts little bit  -RG 2-->hurts little bit  -LL    Posttreatment Pain Rating 2-->hurts little bit  -RG 2-->hurts little bit  -LL      Row Name 25 1540 25 1313       Cognition/Psychosocial    Affect/Mental Status (Cognition) WFL  -RG WFL  -LL    Orientation Status (Cognition) oriented x 4  -RG oriented x 4  -LL     Follows Commands (Cognition) WFL  -RG [facility]  -    Personal Safety Interventions gait belt;fall prevention program maintained;nonskid shoes/slippers when out of bed  -RG gait belt;nonskid shoes/slippers when out of bed;supervised activity  -    Cognitive Function (Cognition) WFL  -RG WFL  -LL      Row Name 08/06/25 1540 08/06/25 1313       Mobility    Extremity Weight-bearing Status left lower extremity;right lower extremity;left upper extremity;right upper extremity  -RG left lower extremity;right lower extremity;left upper extremity;right upper extremity  -LL    Left Upper Extremity (Weight-bearing Status) weight-bearing as tolerated (WBAT)  -RG weight-bearing as tolerated (WBAT)  -LL    Right Upper Extremity (Weight-bearing Status) non weight-bearing (NWB)  can use a platform walker  -RG non weight-bearing (NWB)  can use a platform walker  -LL    Left Lower Extremity (Weight-bearing Status) weight-bearing as tolerated (WBAT)  -RG weight-bearing as tolerated (WBAT)  -LL    Right Lower Extremity (Weight-bearing Status) weight-bearing as tolerated (WBAT)  -RG weight-bearing as tolerated (WBAT)  -      Row Name 08/06/25 1540 08/06/25 1313       Bed Mobility    Bed Mobility supine-sit;sit-supine;supine-sit-supine  -RG supine-sit;sit-supine;supine-sit-supine  -    Comment, (Bed Mobility) -- [facility]  -      Row Name 08/06/25 1540 08/06/25 1313       Transfer Assessment/Treatment    Transfers bed-chair transfer;chair-bed transfer;sit-stand transfer;stand-sit transfer;stand pivot/stand step transfer;toilet transfer  -RG bed-chair transfer;chair-bed transfer;sit-stand transfer;stand-sit transfer;stand pivot/stand step transfer;toilet transfer  -      Row Name 08/06/25 1540 08/06/25 1313       Sit-Stand Transfer    Sit-Stand [place] (Transfers) contact guard;verbal cues;nonverbal cues (demo/gesture);standby assist  -RG contact guard;verbal cues;nonverbal cues (demo/gesture);standby assist  -    Assistive Device  (Sit-Stand Transfers) wheelchair;walker, rolling platform  -RG wheelchair;walker, rolling platform  -LL      Row Name 08/06/25 1540 08/06/25 1313       Stand-Sit Transfer    Stand-Sit [place] (Transfers) contact guard;verbal cues;nonverbal cues (demo/gesture);standby assist  -RG contact guard;verbal cues;nonverbal cues (demo/gesture);standby assist  -LL    Assistive Device (Stand-Sit Transfers) wheelchair;walker, rolling platform  -RG wheelchair;walker, rolling platform  -LL      Row Name 08/06/25 1540 08/06/25 1313       Gait/Stairs (Locomotion)    [place] Level (Gait) verbal cues;nonverbal cues (demo/gesture);contact guard;standby assist  -RG verbal cues;nonverbal cues (demo/gesture);contact guard;standby assist  -LL    Assistive Device (Gait) walker, rolling platform  -RG walker, rolling platform  -LL    Patient was able to Ambulate yes  -RG --    Distance in Feet (Gait) -- --  160' x 2  -LL    Pattern (Gait) step-to  -RG step-to  -LL    Deviations/Abnormal Patterns (Gait) magan decreased;gait speed decreased;stride length decreased  -RG magan decreased;gait speed decreased;stride length decreased  -LL    Bilateral Gait Deviations weight shift ability decreased  -RG weight shift ability decreased  -LL    Comment, (Gait/Stairs) pt ambulated previous session with therapist  -RG --      Row Name 08/06/25 1540 08/06/25 1313       Safety Issues/Impairments Affecting Functional Mobility    Impairments Affecting Function (Mobility) balance;endurance/activity tolerance;pain;strength  -RG balance;endurance/activity tolerance;pain;strength  -LL      Row Name 08/06/25 1313          Balance    Comment, Balance Standing balloon tap  -LL       Row Name 08/06/25 1540 08/06/25 1313       Hip (Therapeutic Exercise)    Hip Strengthening (Therapeutic Exercise) bilateral;flexion;aBduction;aDduction;marching while standing;standing;10 repetitions;2 sets  -RG bilateral;flexion;extension;aBduction;aDduction;marching while  seated;sitting;2 lb free weight;resistance band;green  -LL      Row Name 08/06/25 1540 08/06/25 1313       Knee (Therapeutic Exercise)    Knee Strengthening (Therapeutic Exercise) bilateral;flexion;extension;marching while standing;hamstring curls;standing;10 repetitions;2 sets  -RG bilateral;flexion;extension;marching while seated;LAQ (long arc quad);hamstring curls;sitting;2 lb free weight;resistance band;green  -LL      Row Name 08/06/25 1540 08/06/25 1313       Ankle (Therapeutic Exercise)    Ankle Strengthening (Therapeutic Exercise) bilateral;dorsiflexion;plantarflexion;sitting;10 repetitions;2 sets  -RG bilateral;dorsiflexion;plantarflexion;sitting;2 lb free weight  -LL      Row Name 08/06/25 1540 08/06/25 1313       Positioning and Restraints    Pre-Treatment Position sitting in chair/recliner  -RG --  WC w/ OT  -LL    Post Treatment Position wheelchair  -RG wheelchair  -LL    In Wheelchair notified nsg;sitting;call light within reach;encouraged to call for assist  -RG sitting;call light within reach;encouraged to call for assist;exit alarm on  -LL      Row Name 08/06/25 1540 08/06/25 1313       Therapy Assessment/Plan (PT)    Patient's Goals For Discharge return home  -RG return home  -LL      Row Name 08/06/25 1540 08/06/25 1313       Therapy Assessment/Plan (PT)    Rehab Potential/Prognosis (PT) good  -RG good  -LL    Frequency of Treatment (PT) 5 times per week  -RG 5 times per week  -LL    Estimated Duration of Therapy (PT) 2 weeks  -RG 2 weeks  -LL    Problem List (PT) balance;mobility;strength;pain  <tolerance to activity/endurance  -RG balance;mobility;strength;pain  <tolerance to activity/endurance  -LL    Activity Limitations Related to Problem List (PT) unable to ambulate safely;unable to transfer safely  -RG unable to ambulate safely;unable to transfer safely  -LL      Row Name 08/06/25 1540 08/06/25 1313       Therapy Plan Review/Discharge Plan (PT)    Anticipated Equipment Needs at Discharge  (PT Eval) --  tbd  -RG --  tbd  -LL    Anticipated Discharge Disposition (PT) home with outpatient therapy services  -RG home with outpatient therapy services  -      Row Name 08/06/25 1540 08/06/25 1313       IRF PT Goals    Bed Mobility Goal Selection (PT-IRF) bed mobility, PT goal 1  -RG bed mobility, PT goal 1  -LL    Transfer Goal Selection (PT-IRF) transfers, PT goal 1  -RG transfers, PT goal 1  -LL    Gait (Walking Locomotion) Goal Selection (PT-IRF) gait, PT goal 1  -RG gait, PT goal 1  -LL      Row Name 08/06/25 1540 08/06/25 1313       Bed Mobility Goal 1 (PT-IRF)    Activity/Assistive Device (Bed Mobility Goal 1, PT-IRF) sit to supine/supine to sit  -RG sit to supine/supine to sit  -LL    [place] Level (Bed Mobility Goal 1, PT-IRF) modified independence  -RG modified independence  -LL    Time Frame (Bed Mobility Goal 1, PT-IRF) by discharge  -RG by discharge  -      Row Name 08/06/25 1540 08/06/25 1313       Transfer Goal 1 (PT-IRF)    Activity/Assistive Device (Transfer Goal 1, PT-IRF) sit-to-stand/stand-to-sit;bed-to-chair/chair-to-bed  -RG sit-to-stand/stand-to-sit;bed-to-chair/chair-to-bed  -LL    [place] Level (Transfer Goal 1, PT-IRF) modified independence  -RG modified independence  -LL    Time Frame (Transfer Goal 1, PT-IRF) by discharge  -RG by discharge  -      Row Name 08/06/25 1540 08/06/25 1313       Gait/Walking Locomotion Goal 1 (PT-IRF)    Activity/Assistive Device (Gait/Walking Locomotion Goal 1, PT-IRF) walker, rolling platform  -RG walker, rolling platform  -LL    Gait/Walking Locomotion Distance Goal 1 (PT-IRF) 300'  -'  -LL    [place] Level (Gait/Walking Locomotion Goal 1, PT-IRF) modified independence  -RG modified independence  -LL    Time Frame (Gait/Walking Locomotion Goal 1, PT-IRF) by discharge  -RG by discharge  -LL              User Key  (r) = Recorded By, (t) = Taken By, (c) = Cosigned By      Initials Name Provider Type    Deb Guevara PTA  Physical Therapist Assistant    RG Scott Bhatia PTA Physical Therapist Assistant                  Wound 07/29/25 1842 Right anterior greater trochanter Surgical (Active)   Dressing Appearance intact 08/06/25 0800   Closure Steri strips 08/06/25 0800   Base unable to visualize 08/06/25 0800   Care, Wound other (see comments) 08/05/25 1925       Wound 07/29/25 1843 Right distal wrist Surgical (Active)   Dressing Appearance other (see comments) 08/06/25 0800   Closure Unable to assess 08/06/25 0800   Base unable to visualize 08/06/25 0800       Wound 07/29/25 1844 Right anterior temporal region (Active)   Dressing Appearance open to air 08/06/25 0800   Closure Approximated 08/06/25 0800   Base dry;scab 08/06/25 0800   Drainage Amount none 08/05/25 1925   Care, Wound other (see comments) 08/06/25 0800   Dressing Care open to air 08/05/25 1925       Wound 07/29/25 1849 Right lateral thigh (Active)   Dressing Appearance open to air 08/06/25 0800   Closure Steri strips 08/06/25 0800   Base dry;pink 08/06/25 0800   Drainage Amount none 08/05/25 1925   Care, Wound other (see comments) 08/06/25 0800   Dressing Care open to air 08/05/25 1925     Physical Therapy Education       Title: PT OT SLP Therapies (Done)       Topic: Physical Therapy (Done)       Point: Mobility training (Done)       Learning Progress Summary            Patient Acceptance, E,D, VU,NR by RG at 8/6/2025 1543    Acceptance, E,D, VU by  at 8/6/2025 1319    Acceptance, E,TB, VU,DU by  at 8/5/2025 1555    Acceptance, E,D, VU,NR by RG at 8/5/2025 1541    Acceptance, E,TB, VU,DU by  at 8/4/2025 1518    Acceptance, E,D, VU,NR by RG at 8/4/2025 1515    Acceptance, E,D, VU,NR by RG at 8/2/2025 1514    Acceptance, E, VU,DU by  at 8/1/2025 1558    Acceptance, E,D, VU,NR by  at 8/1/2025 1540    Acceptance, E,D, VU,NR by RG at 7/31/2025 1504    Acceptance, E, VU,NR by LB at 7/30/2025 1205                      Point: Home exercise program (Done)        Learning Progress Summary            Patient Acceptance, E,D, VU,NR by RG at 8/6/2025 1543    Acceptance, E,D, VU by LL at 8/6/2025 1319    Acceptance, E,TB, VU,DU by HC at 8/5/2025 1555    Acceptance, E,D, VU,NR by RG at 8/5/2025 1541    Acceptance, E,TB, VU,DU by HC at 8/4/2025 1518    Acceptance, E,D, VU,NR by RG at 8/4/2025 1515    Acceptance, E,D, VU,NR by RG at 8/2/2025 1514    Acceptance, E, VU,DU by HC at 8/1/2025 1558    Acceptance, E,D, VU,NR by RG at 8/1/2025 1540    Acceptance, E,D, VU,NR by RG at 7/31/2025 1504    Acceptance, E, VU,NR by LB at 7/30/2025 1205                      Point: Body mechanics (Done)       Learning Progress Summary            Patient Acceptance, E,D, VU,NR by RG at 8/6/2025 1543    Acceptance, E,D, VU by LL at 8/6/2025 1319    Acceptance, E,TB, VU,DU by HC at 8/5/2025 1555    Acceptance, E,D, VU,NR by RG at 8/5/2025 1541    Acceptance, E,TB, VU,DU by HC at 8/4/2025 1518    Acceptance, E,D, VU,NR by RG at 8/4/2025 1515    Acceptance, E,D, VU,NR by RG at 8/2/2025 1514    Acceptance, E, VU,DU by HC at 8/1/2025 1558    Acceptance, E,D, VU,NR by RG at 8/1/2025 1540    Acceptance, E,D, VU,NR by RG at 7/31/2025 1504    Acceptance, E, VU,NR by LB at 7/30/2025 1205                      Point: Precautions (Done)       Learning Progress Summary            Patient Acceptance, E,D, VU,NR by RG at 8/6/2025 1543    Acceptance, E,D, VU by LL at 8/6/2025 1319    Acceptance, E,TB, VU,DU by HC at 8/5/2025 1555    Acceptance, E,D, VU,NR by RG at 8/5/2025 1541    Acceptance, E,TB, VU,DU by HC at 8/4/2025 1518    Acceptance, E,D, VU,NR by RG at 8/4/2025 1515    Acceptance, E,D, VU,NR by RG at 8/2/2025 1514    Acceptance, E, VU,DU by HC at 8/1/2025 1558    Acceptance, E,D, VU,NR by RG at 8/1/2025 1540    Acceptance, E,D, VU,NR by RG at 7/31/2025 1504    Acceptance, E, VU,NR by  at 7/30/2025 1205                                      User Key       Initials Effective Dates Name Provider Type Discipline     LB 06/16/21 -  Nicolette Pierce, PT Physical Therapist PT    LL 05/02/16 -  Deb Maya, MAKENZIE Physical Therapist Assistant PT    RG 06/16/21 -  Scott Bhatia PTA Physical Therapist Assistant PT    HC 01/20/23 -  Maryam Wills PTA Physical Therapist Assistant PT                    PT Recommendation and Plan    Frequency of Treatment (PT): 5 times per week  Anticipated Equipment Needs at Discharge (PT Eval):  (tbd)                  Time Calculation:      PT Charges       Row Name 08/06/25 1543 08/06/25 1319          Time Calculation    Start Time 1330  -RG 1045  -LL     Stop Time 1415  -RG 1130  -LL     Time Calculation (min) 45 min  -RG 45 min  -LL     PT Received On 08/06/25  -RG 08/06/25  -LL        Time Calculation- PT    Total Timed Code Minutes- PT 45 minute(s)  -RG 45 minute(s)  -LL               User Key  (r) = Recorded By, (t) = Taken By, (c) = Cosigned By      Initials Name Provider Type     Deb Maya, MAKENZIE Physical Therapist Assistant     Scott Bhatia PTA Physical Therapist Assistant                    Therapy Charges for Today       Code Description Service Date Service Provider Modifiers Qty    50600120606 HC GAIT TRAINING EA 15 MIN 8/5/2025 Scott Bhatia, PTA GP, CQ 1    88528372229 HC PT THERAPEUTIC ACT EA 15 MIN 8/5/2025 Scott Bhatia, PTA GP, CQ 1    97662280387 HC PT THER PROC EA 15 MIN 8/5/2025 Scott Bhatia PTA GP, CQ 1    54833700502 HC PT THERAPEUTIC ACT EA 15 MIN 8/6/2025 Scott Bhatia PTA GP, CQ 1    26074287639 HC PT THER PROC EA 15 MIN 8/6/2025 Scott Bhatia, PTA GP, CQ 2              PT G-Codes  AM-PAC 6 Clicks Score (PT): 17      Scott Bhatia PTA  8/6/2025

## 2025-08-06 NOTE — THERAPY TREATMENT NOTE
Inpatient Rehabilitation - Occupational Therapy Treatment Note     Nav     Patient Name: Ninoska Stock  : 1980  MRN: [id number]    Today's Date: 2025                 Admit Date: 2025       No diagnosis found.    Patient Active Problem List   Diagnosis    Shortness of breath    Immunization due    Abnormal CT of the chest    Cigarette nicotine dependence without complication    Hypersomnia    Trauma       Past Medical History:   Diagnosis Date    Anxiety     Asthma     Depression     Heart disease     Multiple sclerosis        Past Surgical History:   Procedure Laterality Date    CHOLECYSTECTOMY      FEMUR FRACTURE SURGERY      FOOT SURGERY      HYSTERECTOMY      KIDNEY SURGERY      TUBAL ABDOMINAL LIGATION      WRIST FRACTURE SURGERY               IRF OT ASSESSMENT FLOWSHEET (Last 12 Hours)       IRF OT Evaluation and Treatment       Row Name 25 1316          OT Time and Intention    Document Type daily treatment  -LA     Mode of Treatment occupational therapy  -LA     Total Minutes, Occupational Therapy 105  -LA     Patient Effort excellent  -LA     Symptoms Noted During/After Treatment none  -LA       Row Name 25 1316          General Information    Patient Profile Reviewed yes  -LA     General Observations of Patient Patient agreeable to therapy this date. Patient pleasant and verbalized excitement for upcoming discharge home.  -LA     Existing Precautions/Restrictions fall;cervical collar;brace on at all times  -LA       Row Name 25 1316          Pain    Pretreatment Pain Rating 2/10  -LA     Posttreatment Pain Rating 3/10  -LA     Pain Location neck  -LA       Row Name 25 1316          Cognition/Psychosocial    Affect/Mental Status (Cognition) WFL  -LA     Orientation Status (Cognition) oriented x 4  -LA     Follows Commands (Cognition) WFL  -LA       Row Name 25 1316          Bathing    [place] Level (Bathing) set up  -LA       Row Name 25 1316           Upper Body Dressing    [place] Level (Upper Body Dressing) set up assistance  -LA       Row Name 08/06/25 1316          Lower Body Dressing    [place] Level (Lower Body Dressing) set up  -LA       Row Name 08/06/25 1316          Grooming    [place] Level (Grooming) set up  -[facility] Name 08/06/25 1316          Toileting    [place] Level (Toileting) supervision  -[facility] Name 08/06/25 1316          Sit-Stand Transfer    Sit-Stand [place] (Transfers) standby assist;contact guard  -[facility] Name 08/06/25 1316          Toilet Transfer    Type (Toilet Transfer) stand pivot/stand step  -LA     [place] Level (Toilet Transfer) supervision  -[facility] Name 08/06/25 1316          Motor Skills    Motor Skills coordination;functional endurance  -LA     Coordination WFL  -LA     Functional Endurance good  -LA     Motor Control/Coordination Interventions fine motor manipulation/dexterity activities;occupation/activity based treatment;therapeutic exercise/ROM  -LA     Therapeutic Exercise shoulder;elbow/forearm;wrist;hand  -[facility] Name 08/06/25 1316          Positioning and Restraints    Pre-Treatment Position in bed  -LA     Post Treatment Position wheelchair  -LA     In Wheelchair sitting;call light within reach;encouraged to call for assist;exit alarm on  -[facility] Name 08/06/25 1316          Therapy Plan Review/Discharge Plan (OT)    Therapy Plan Review (OT) --  OT reviewed current level of assist and safety with ADLs. Patient with good safety and good understanding.  -LA     Anticipated Discharge Disposition (OT) home with assist  -LA               User Key  (r) = Recorded By, (t) = Taken By, (c) = Cosigned By      Initials Name Effective Dates    Radha Bonilla, OT 02/14/22 -                      Occupational Therapy Education       Title: PT OT SLP Therapies (Done)       Topic: Occupational Therapy (Done)       Point: ADL training (Done)       Learning  Progress Summary            Patient Acceptance, E,TB,D, VU,DU,NR by LA at 8/6/2025 1320    Acceptance, E,D, VU,NR by TM at 8/5/2025 1517    Acceptance, E,D, VU,NR by TM at 8/4/2025 1545                      Point: Home exercise program (Done)       Learning Progress Summary            Patient Acceptance, E,TB,D, VU,DU,NR by LA at 8/6/2025 1320                      Point: Precautions (Done)       Learning Progress Summary            Patient Acceptance, E,TB,D, VU,DU,NR by LA at 8/6/2025 1320    Acceptance, E,D, VU,NR by TM at 8/5/2025 1517    Acceptance, E,D, VU,NR by TM at 8/4/2025 1545                      Point: Body mechanics (Done)       Learning Progress Summary            Patient Acceptance, E,TB,D, VU,DU,NR by LA at 8/6/2025 1320                                      User Key       Initials Effective Dates Name Provider Type Discipline     06/16/21 -  Humera Perry OT Occupational Therapist OT    LA 02/14/22 -  Radha Sánchez OT Occupational Therapist OT                        OT Recommendation and Plan                         Time Calculation:      Time Calculation- OT       Row Name 08/06/25 1321 08/06/25 1320          Time Calculation- OT    OT Start Time 1245  -LA 0800  -LA     OT Stop Time 1315  -LA 0915  -LA     OT Time Calculation (min) 30 min  -LA 75 min  -LA     Total Timed Code Minutes- OT 30 minute(s)  -LA 75 minute(s)  -LA               User Key  (r) = Recorded By, (t) = Taken By, (c) = Cosigned By      Initials Name Provider Type    Radha Bonilla OT Occupational Therapist                  Therapy Charges for Today       Code Description Service Date Service Provider Modifiers Qty    19832735872 HC OT SELF CARE/MGMT/TRAIN EA 15 MIN 8/6/2025 Radha Sánchez OT GO 2    40929218871 HC OT THER PROC EA 15 MIN 8/6/2025 Radha Sánchez OT GO 2    51699280701 HC OT THERAPEUTIC ACT EA 15 MIN 8/6/2025 Radha Sánchez OT GO 3                     Radha Sánchez OT  8/6/2025

## 2025-08-06 NOTE — SIGNIFICANT NOTE
08/06/25 1447   Plan   Plan Contacted PT [facility] [phone] per preference per Diana with discharge on 8-7-25 and need for outpatient PT/OT 3 x wk x 4 wks.  Pt needs PT to evaluate and treat for strengthening, endurance, gait training, transfer training, balance, therapeutic exercise, bed mobility, ROM and OT to evaluate and treat for ADL re-training, home safety, functional mobility, strengthening, coordination and endurance.  Pt has an appointment on 8-11-25 at 2:00 pm for PT evaluation.  OT to be scheduled when spot is available.  Faxed face sheet, H&P, PT/OT progress notes/evaluations, and MD progress note to [facility] [phone].  Ambulatory referral for outpatient therapy and discharge summary to be faxed at discharge.  Attempted to contact [facility] [phone] per rotation.  Contacted [facility] [phone] per Aylin who says they are in-network with pt's insurance, however, [facility] is.  Contacted [facility] [phone] per rotation per Ortiz about pt being discharged on 8-7-25 and need for rolling walker with right platform attachment.  DME to be delivered to rehab on 8-7-25.  Faxed face sheet, H&P, PT progress note/evaluation to [facility].  MD order for DME and discharge summary to be faxed at discharge.  Spoke to daughter [place] [phone] about plans for pt to be discharged on 8-7-25,  how she has progressed in therapy, outpatient PT/OT, appointment at [facility] on 8-11-25 at 2:00 pm for PT evaluation and OT to be scheduled when available, and need for RW with right platform attachment.  Daughter will come to rehab around 10 am for discharge and transport home.   Patient/Family in Agreement with Plan yes

## 2025-08-06 NOTE — THERAPY TREATMENT NOTE
Inpatient Rehabilitation - Physical Therapy Treatment Note       FLACO Chopra     Patient Name: Ninoska Stock  : 1980  MRN: [id number]    Today's Date: 2025                    Admit Date: 2025      Visit Dx:   No diagnosis found.    Patient Active Problem List   Diagnosis    Shortness of breath    Immunization due    Abnormal CT of the chest    Cigarette nicotine dependence without complication    Hypersomnia    Trauma       Past Medical History:   Diagnosis Date    Anxiety     Asthma     Depression     Heart disease     Multiple sclerosis        Past Surgical History:   Procedure Laterality Date    CHOLECYSTECTOMY      FEMUR FRACTURE SURGERY      FOOT SURGERY      HYSTERECTOMY      KIDNEY SURGERY      TUBAL ABDOMINAL LIGATION      WRIST FRACTURE SURGERY         PT ASSESSMENT (Last 12 Hours)       IRF PT Evaluation and Treatment       Row Name 25 1313          PT Time and Intention    Document Type daily treatment  -LL     Mode of Treatment individual therapy;physical therapy  -LL     Patient/Family/Caregiver Comments/Observations Patient had no new c/o this date and was agreeable to PT participation.  -LL       Row Name 25 1313          General Information    Existing Precautions/Restrictions fall;cervical collar;brace on at all times;lifting  -LL       Row Name 25 1313          Pain Scale: FACES Pre/Post-Treatment    Pain: FACES Scale, Pretreatment 2-->hurts little bit  -LL     Posttreatment Pain Rating 2-->hurts little bit  -LL       Row Name 25 1313          Cognition/Psychosocial    Affect/Mental Status (Cognition) WFL  -LL     Orientation Status (Cognition) oriented x 4  -LL     Follows Commands (Cognition) WFL  -LL     Personal Safety Interventions gait belt;nonskid shoes/slippers when out of bed;supervised activity  -LL     Cognitive Function (Cognition) WFL  -LL       Row Name 25 1313          Mobility    Extremity Weight-bearing Status left lower extremity;right  lower extremity;left upper extremity;right upper extremity  -LL     Left Upper Extremity (Weight-bearing Status) weight-bearing as tolerated (WBAT)  -LL     Right Upper Extremity (Weight-bearing Status) non weight-bearing (NWB)  can use a platform walker  -LL     Left Lower Extremity (Weight-bearing Status) weight-bearing as tolerated (WBAT)  -LL     Right Lower Extremity (Weight-bearing Status) weight-bearing as tolerated (WBAT)  -LL       Row Name 08/06/25 1313          Bed Mobility    Bed Mobility supine-sit;sit-supine;supine-sit-supine  -LL     Comment, (Bed Mobility) UIC  -       Row Name 08/06/25 1313          Transfer Assessment/Treatment    Transfers bed-chair transfer;chair-bed transfer;sit-stand transfer;stand-sit transfer;stand pivot/stand step transfer;toilet transfer  -       Row Name 08/06/25 1313          Sit-Stand Transfer    Sit-Stand [place] (Transfers) contact guard;verbal cues;nonverbal cues (demo/gesture);standby assist  -LL     Assistive Device (Sit-Stand Transfers) wheelchair;walker, rolling platform  -LL       Row Name 08/06/25 1313          Stand-Sit Transfer    Stand-Sit [place] (Transfers) contact guard;verbal cues;nonverbal cues (demo/gesture);standby assist  -LL     Assistive Device (Stand-Sit Transfers) wheelchair;walker, rolling platform  -LL       Row Name 08/06/25 1313          Gait/Stairs (Locomotion)    [place] Level (Gait) verbal cues;nonverbal cues (demo/gesture);contact guard;standby assist  -LL     Assistive Device (Gait) walker, rolling platform  -LL     Distance in Feet (Gait) --  160' x 2  -LL     Pattern (Gait) step-to  -LL     Deviations/Abnormal Patterns (Gait) magan decreased;gait speed decreased;stride length decreased  -LL     Bilateral Gait Deviations weight shift ability decreased  -LL       Row Name 08/06/25 1313          Safety Issues/Impairments Affecting Functional Mobility    Impairments Affecting Function (Mobility)  balance;endurance/activity tolerance;pain;strength  -LL       Row Name 08/06/25 1313          Balance    Comment, Balance Standing balloon tap  -LL       Row Name 08/06/25 1313          Hip (Therapeutic Exercise)    Hip Strengthening (Therapeutic Exercise) bilateral;flexion;extension;aBduction;aDduction;marching while seated;sitting;2 lb free weight;resistance band;green  -LL       Row Name 08/06/25 1313          Knee (Therapeutic Exercise)    Knee Strengthening (Therapeutic Exercise) bilateral;flexion;extension;marching while seated;LAQ (long arc quad);hamstring curls;sitting;2 lb free weight;resistance band;green  -LL       Row Name 08/06/25 1313          Ankle (Therapeutic Exercise)    Ankle Strengthening (Therapeutic Exercise) bilateral;dorsiflexion;plantarflexion;sitting;2 lb free weight  -       Row Name 08/06/25 1313          Positioning and Restraints    Pre-Treatment Position --  WC w/ OT  -LL     Post Treatment Position wheelchair  -LL     In Wheelchair sitting;call light within reach;encouraged to call for assist;exit alarm on  -       Row Name 08/06/25 1313          Therapy Assessment/Plan (PT)    Patient's Goals For Discharge return home  -       Row Name 08/06/25 1313          Therapy Assessment/Plan (PT)    Rehab Potential/Prognosis (PT) good  -LL     Frequency of Treatment (PT) 5 times per week  -LL     Estimated Duration of Therapy (PT) 2 weeks  -LL     Problem List (PT) balance;mobility;strength;pain  <tolerance to activity/endurance  -LL     Activity Limitations Related to Problem List (PT) unable to ambulate safely;unable to transfer safely  -       Row Name 08/06/25 1313          Therapy Plan Review/Discharge Plan (PT)    Anticipated Equipment Needs at Discharge (PT Eval) --  tbd  -LL     Anticipated Discharge Disposition (PT) home with outpatient therapy services  -       Row Name 08/06/25 1313          IRF PT Goals    Bed Mobility Goal Selection (PT-IRF) bed mobility, PT goal 1  -LL      Transfer Goal Selection (PT-IRF) transfers, PT goal 1  -LL     Gait (Walking Locomotion) Goal Selection (PT-IRF) gait, PT goal 1  -LL       Row Name 08/06/25 1313          Bed Mobility Goal 1 (PT-IRF)    Activity/Assistive Device (Bed Mobility Goal 1, PT-IRF) sit to supine/supine to sit  -LL     [place] Level (Bed Mobility Goal 1, PT-IRF) modified independence  -LL     Time Frame (Bed Mobility Goal 1, PT-IRF) by discharge  -LL       Row Name 08/06/25 1313          Transfer Goal 1 (PT-IRF)    Activity/Assistive Device (Transfer Goal 1, PT-IRF) sit-to-stand/stand-to-sit;bed-to-chair/chair-to-bed  -LL     [place] Level (Transfer Goal 1, PT-IRF) modified independence  -LL     Time Frame (Transfer Goal 1, PT-IRF) by discharge  -LL       Row Name 08/06/25 1313          Gait/Walking Locomotion Goal 1 (PT-IRF)    Activity/Assistive Device (Gait/Walking Locomotion Goal 1, PT-IRF) walker, rolling platform  -LL     Gait/Walking Locomotion Distance Goal 1 (PT-IRF) 300'  -LL     [place] Level (Gait/Walking Locomotion Goal 1, PT-IRF) modified independence  -LL     Time Frame (Gait/Walking Locomotion Goal 1, PT-IRF) by discharge  -LL               User Key  (r) = Recorded By, (t) = Taken By, (c) = Cosigned By      Initials Name Provider Type    LL Deb Maya, PTA Physical Therapist Assistant                  Wound 07/29/25 1842 Right anterior greater trochanter Surgical (Active)   Dressing Appearance intact 08/06/25 0800   Closure Steri strips 08/06/25 0800   Base unable to visualize 08/06/25 0800   Care, Wound other (see comments) 08/05/25 1925       Wound 07/29/25 1843 Right distal wrist Surgical (Active)   Dressing Appearance other (see comments) 08/06/25 0800   Closure Unable to assess 08/06/25 0800   Base unable to visualize 08/06/25 0800       Wound 07/29/25 1844 Right anterior temporal region (Active)   Dressing Appearance open to air 08/06/25 0800   Closure Approximated 08/06/25 0800   Base dry;scab  08/06/25 0800   Drainage Amount none 08/05/25 1925   Care, Wound other (see comments) 08/06/25 0800   Dressing Care open to air 08/05/25 1925       Wound 07/29/25 1849 Right lateral thigh (Active)   Dressing Appearance open to air 08/06/25 0800   Closure Steri strips 08/06/25 0800   Base dry;pink 08/06/25 0800   Drainage Amount none 08/05/25 1925   Care, Wound other (see comments) 08/06/25 0800   Dressing Care open to air 08/05/25 1925     Physical Therapy Education       Title: PT OT SLP Therapies (Done)       Topic: Physical Therapy (Done)       Point: Mobility training (Done)       Learning Progress Summary            Patient Acceptance, E,D, VU by  at 8/6/2025 1319    Acceptance, E,TB, VU,DU by  at 8/5/2025 1555    Acceptance, E,D, VU,NR by RG at 8/5/2025 1541    Acceptance, E,TB, VU,DU by HC at 8/4/2025 1518    Acceptance, E,D, VU,NR by RG at 8/4/2025 1515    Acceptance, E,D, VU,NR by RG at 8/2/2025 1514    Acceptance, E, VU,DU by HC at 8/1/2025 1558    Acceptance, E,D, VU,NR by RG at 8/1/2025 1540    Acceptance, E,D, VU,NR by RG at 7/31/2025 1504    Acceptance, E, VU,NR by  at 7/30/2025 1205                      Point: Home exercise program (Done)       Learning Progress Summary            Patient Acceptance, E,D, VU by  at 8/6/2025 1319    Acceptance, E,TB, VU,DU by HC at 8/5/2025 1555    Acceptance, E,D, VU,NR by RG at 8/5/2025 1541    Acceptance, E,TB, VU,DU by HC at 8/4/2025 1518    Acceptance, E,D, VU,NR by RG at 8/4/2025 1515    Acceptance, E,D, VU,NR by RG at 8/2/2025 1514    Acceptance, E, VU,DU by HC at 8/1/2025 1558    Acceptance, E,D, VU,NR by RG at 8/1/2025 1540    Acceptance, E,D, VU,NR by RG at 7/31/2025 1504    Acceptance, E, VU,NR by LB at 7/30/2025 1205                      Point: Body mechanics (Done)       Learning Progress Summary            Patient Acceptance, E,D, VU by LL at 8/6/2025 1319    Acceptance, E,TB, VU,DU by HC at 8/5/2025 1555    Acceptance, E,D, VU,NR by RG at  8/5/2025 1541    Acceptance, E,TB, VU,DU by HC at 8/4/2025 1518    Acceptance, E,D, VU,NR by RG at 8/4/2025 1515    Acceptance, E,D, VU,NR by RG at 8/2/2025 1514    Acceptance, E, VU,DU by HC at 8/1/2025 1558    Acceptance, E,D, VU,NR by RG at 8/1/2025 1540    Acceptance, E,D, VU,NR by RG at 7/31/2025 1504    Acceptance, E, VU,NR by LB at 7/30/2025 1205                      Point: Precautions (Done)       Learning Progress Summary            Patient Acceptance, E,D, VU by  at 8/6/2025 1319    Acceptance, E,TB, VU,DU by HC at 8/5/2025 1555    Acceptance, E,D, VU,NR by RG at 8/5/2025 1541    Acceptance, E,TB, VU,DU by HC at 8/4/2025 1518    Acceptance, E,D, VU,NR by RG at 8/4/2025 1515    Acceptance, E,D, VU,NR by RG at 8/2/2025 1514    Acceptance, E, VU,DU by HC at 8/1/2025 1558    Acceptance, E,D, VU,NR by RG at 8/1/2025 1540    Acceptance, E,D, VU,NR by RG at 7/31/2025 1504    Acceptance, E, VU,NR by LB at 7/30/2025 1205                                      User Key       Initials Effective Dates Name Provider Type Discipline    LB 06/16/21 -  Nicolette Pierce, PT Physical Therapist PT    LL 05/02/16 -  Deb Maya, PTA Physical Therapist Assistant PT    RG 06/16/21 -  Scott Bhatia PTA Physical Therapist Assistant PT    HC 01/20/23 -  Maryam Wills PTA Physical Therapist Assistant PT                    PT Recommendation and Plan    Frequency of Treatment (PT): 5 times per week  Anticipated Equipment Needs at Discharge (PT Eval):  (tbd)                  Time Calculation:      PT Charges       Row Name 08/06/25 1319             Time Calculation    Start Time 1045  -LL      Stop Time 1130  -LL      Time Calculation (min) 45 min  -LL      PT Received On 08/06/25  -LL         Time Calculation- PT    Total Timed Code Minutes- PT 45 minute(s)  -LL                User Key  (r) = Recorded By, (t) = Taken By, (c) = Cosigned By      Initials Name Provider Type    Deb Guevara PTA Physical  Therapist Assistant                    Therapy Charges for Today       Code Description Service Date Service Provider Modifiers Qty    03348148964 HC GAIT TRAINING EA 15 MIN 8/6/2025 Deb Maya, MAKENZIE GP, CQ 1    10283188009 HC PT THERAPEUTIC ACT EA 15 MIN 8/6/2025 Deb Maya, MAKENZIE GP, CQ 1    37827041005 HC PT THER PROC EA 15 MIN 8/6/2025 Deb Maya, MAKENZIE GP, CQ 1              PT G-Codes  AM-PAC 6 Clicks Score (PT): 17      Deb. MAKENZIE Maya  8/6/2025

## 2025-08-06 NOTE — DISCHARGE INSTR - OTHER ORDERS
Pt will receive outpatient PT/OT at [facility] [phone].  Pt has an appointment on Monday 8-11-25 at 2:00 pm for PT evaluation.  OT to be scheduled when available.    [facility] [phone] for rolling walker with right platform attachment.

## 2025-08-07 VITALS
TEMPERATURE: 98.4 F | SYSTOLIC BLOOD PRESSURE: 154 MMHG | WEIGHT: 246.7 LBS | HEART RATE: 94 BPM | OXYGEN SATURATION: 97 % | DIASTOLIC BLOOD PRESSURE: 79 MMHG | HEIGHT: 60 IN | RESPIRATION RATE: 18 BRPM | BODY MASS INDEX: 48.43 KG/M2

## 2025-08-07 LAB
ANION GAP SERPL CALCULATED.3IONS-SCNC: 15.9 MMOL/L (ref 5–15)
BASOPHILS # BLD AUTO: 0.04 10*3/MM3 (ref 0–0.2)
BASOPHILS NFR BLD AUTO: 0.6 % (ref 0–1.5)
BUN SERPL-MCNC: 14.4 MG/DL (ref 6–20)
BUN/CREAT SERPL: 19.5 (ref 7–25)
CALCIUM SPEC-SCNC: 9.4 MG/DL (ref 8.6–10.5)
CHLORIDE SERPL-SCNC: 91 MMOL/L (ref 98–107)
CO2 SERPL-SCNC: 27.1 MMOL/L (ref 22–29)
CREAT SERPL-MCNC: 0.74 MG/DL (ref 0.57–1)
DEPRECATED RDW RBC AUTO: 49.7 FL (ref 37–54)
EGFRCR SERPLBLD CKD-EPI 2021: 102.5 ML/MIN/1.73
EOSINOPHIL # BLD AUTO: 0.17 10*3/MM3 (ref 0–0.4)
EOSINOPHIL NFR BLD AUTO: 2.4 % (ref 0.3–6.2)
ERYTHROCYTE [DISTWIDTH] IN BLOOD BY AUTOMATED COUNT: 14.8 % (ref 12.3–15.4)
GLUCOSE SERPL-MCNC: 109 MG/DL (ref 65–99)
HCT VFR BLD AUTO: 34.6 % (ref 34–46.6)
HGB BLD-MCNC: 10.8 G/DL (ref 12–15.9)
IMM GRANULOCYTES # BLD AUTO: 0.01 10*3/MM3 (ref 0–0.05)
IMM GRANULOCYTES NFR BLD AUTO: 0.1 % (ref 0–0.5)
LYMPHOCYTES # BLD AUTO: 2.23 10*3/MM3 (ref 0.7–3.1)
LYMPHOCYTES NFR BLD AUTO: 31.7 % (ref 19.6–45.3)
MCH RBC QN AUTO: 28.7 PG (ref 26.6–33)
MCHC RBC AUTO-ENTMCNC: 31.2 G/DL (ref 31.5–35.7)
MCV RBC AUTO: 92 FL (ref 79–97)
MONOCYTES # BLD AUTO: 0.94 10*3/MM3 (ref 0.1–0.9)
MONOCYTES NFR BLD AUTO: 13.4 % (ref 5–12)
NEUTROPHILS NFR BLD AUTO: 3.65 10*3/MM3 (ref 1.7–7)
NEUTROPHILS NFR BLD AUTO: 51.8 % (ref 42.7–76)
NRBC BLD AUTO-RTO: 0 /100 WBC (ref 0–0.2)
PLATELET # BLD AUTO: 512 10*3/MM3 (ref 140–450)
PMV BLD AUTO: 9.1 FL (ref 6–12)
POTASSIUM SERPL-SCNC: 3.4 MMOL/L (ref 3.5–5.2)
RBC # BLD AUTO: 3.76 10*6/MM3 (ref 3.77–5.28)
SODIUM SERPL-SCNC: 134 MMOL/L (ref 136–145)
WBC NRBC COR # BLD AUTO: 7.04 10*3/MM3 (ref 3.4–10.8)

## 2025-08-07 PROCEDURE — 94799 UNLISTED PULMONARY SVC/PX: CPT

## 2025-08-07 PROCEDURE — 94761 N-INVAS EAR/PLS OXIMETRY MLT: CPT

## 2025-08-07 PROCEDURE — 80048 BASIC METABOLIC PNL TOTAL CA: CPT | Performed by: FAMILY MEDICINE

## 2025-08-07 PROCEDURE — 85025 COMPLETE CBC W/AUTO DIFF WBC: CPT | Performed by: FAMILY MEDICINE

## 2025-08-07 PROCEDURE — 97530 THERAPEUTIC ACTIVITIES: CPT

## 2025-08-07 PROCEDURE — 94664 DEMO&/EVAL PT USE INHALER: CPT

## 2025-08-07 PROCEDURE — 25010000002 HEPARIN (PORCINE) PER 1000 UNITS: Performed by: INTERNAL MEDICINE

## 2025-08-07 RX ORDER — VERAPAMIL HYDROCHLORIDE 120 MG/1
120 CAPSULE, EXTENDED RELEASE ORAL NIGHTLY
Qty: 30 CAPSULE | Refills: 0 | Status: SHIPPED | OUTPATIENT
Start: 2025-08-07 | End: 2025-09-06

## 2025-08-07 RX ORDER — METHOCARBAMOL 500 MG/1
500 TABLET, FILM COATED ORAL EVERY 6 HOURS PRN
Qty: 40 TABLET | Refills: 0 | Status: SHIPPED | OUTPATIENT
Start: 2025-08-07 | End: 2025-08-17

## 2025-08-07 RX ORDER — TIRZEPATIDE 2.5 MG/.5ML
2.5 INJECTION, SOLUTION SUBCUTANEOUS WEEKLY
Start: 2025-08-07

## 2025-08-07 RX ORDER — BUSPIRONE HYDROCHLORIDE 10 MG/1
10 TABLET ORAL 2 TIMES DAILY
Qty: 60 TABLET | Refills: 0 | Status: SHIPPED | OUTPATIENT
Start: 2025-08-07 | End: 2025-09-06

## 2025-08-07 RX ORDER — MELOXICAM 7.5 MG/1
7.5 TABLET ORAL DAILY PRN
Qty: 14 TABLET | Refills: 0 | Status: SHIPPED | OUTPATIENT
Start: 2025-08-07

## 2025-08-07 RX ORDER — AMOXICILLIN 250 MG
2 CAPSULE ORAL 2 TIMES DAILY
Qty: 56 TABLET | Refills: 0 | Status: SHIPPED | OUTPATIENT
Start: 2025-08-07 | End: 2025-08-21

## 2025-08-07 RX ORDER — GABAPENTIN 400 MG/1
800 CAPSULE ORAL EVERY 8 HOURS SCHEDULED
Qty: 60 CAPSULE | Refills: 0 | Status: SHIPPED | OUTPATIENT
Start: 2025-08-07 | End: 2025-08-17

## 2025-08-07 RX ORDER — POTASSIUM CHLORIDE 1500 MG/1
40 TABLET, EXTENDED RELEASE ORAL ONCE
Status: COMPLETED | OUTPATIENT
Start: 2025-08-07 | End: 2025-08-07

## 2025-08-07 RX ORDER — POLYETHYLENE GLYCOL 3350 17 G/17G
17 POWDER, FOR SOLUTION ORAL DAILY
Qty: 238 G | Refills: 0 | Status: SHIPPED | OUTPATIENT
Start: 2025-08-07 | End: 2025-08-21

## 2025-08-07 RX ORDER — DULOXETIN HYDROCHLORIDE 60 MG/1
60 CAPSULE, DELAYED RELEASE ORAL EVERY 12 HOURS SCHEDULED
Qty: 60 CAPSULE | Refills: 0 | Status: SHIPPED | OUTPATIENT
Start: 2025-08-07 | End: 2025-09-06

## 2025-08-07 RX ORDER — HYDROXYZINE HYDROCHLORIDE 25 MG/1
25 TABLET, FILM COATED ORAL EVERY 6 HOURS PRN
Qty: 40 TABLET | Refills: 0 | Status: SHIPPED | OUTPATIENT
Start: 2025-08-07 | End: 2025-08-21

## 2025-08-07 RX ORDER — HYDROCHLOROTHIAZIDE 25 MG/1
25 TABLET ORAL DAILY
Qty: 30 TABLET | Refills: 0 | Status: SHIPPED | OUTPATIENT
Start: 2025-08-07 | End: 2025-09-06

## 2025-08-07 RX ORDER — MODAFINIL 200 MG/1
200 TABLET ORAL DAILY
Qty: 7 TABLET | Refills: 0 | Status: SHIPPED | OUTPATIENT
Start: 2025-08-07 | End: 2025-08-14

## 2025-08-07 RX ORDER — OXYCODONE HYDROCHLORIDE 10 MG/1
10 TABLET ORAL EVERY 6 HOURS PRN
Qty: 20 TABLET | Refills: 0 | Status: SHIPPED | OUTPATIENT
Start: 2025-08-07 | End: 2025-08-12

## 2025-08-07 RX ORDER — ALBUTEROL SULFATE 90 UG/1
1 INHALANT RESPIRATORY (INHALATION) EVERY 4 HOURS PRN
Qty: 6.7 G | Refills: 0 | Status: SHIPPED | OUTPATIENT
Start: 2025-08-07 | End: 2025-09-09

## 2025-08-07 RX ADMIN — VERAPAMIL HYDROCHLORIDE 120 MG: 120 TABLET, FILM COATED, EXTENDED RELEASE ORAL at 08:39

## 2025-08-07 RX ADMIN — BUSPIRONE HYDROCHLORIDE 10 MG: 10 TABLET ORAL at 08:39

## 2025-08-07 RX ADMIN — BUDESONIDE AND FORMOTEROL FUMARATE DIHYDRATE 2 PUFF: 160; 4.5 AEROSOL RESPIRATORY (INHALATION) at 07:56

## 2025-08-07 RX ADMIN — CARIPRAZINE 6 MG: 3 CAPSULE, GELATIN COATED ORAL at 08:39

## 2025-08-07 RX ADMIN — MODAFINIL 200 MG: 100 TABLET ORAL at 08:39

## 2025-08-07 RX ADMIN — SENNOSIDES AND DOCUSATE SODIUM 2 TABLET: 50; 8.6 TABLET ORAL at 08:39

## 2025-08-07 RX ADMIN — DULOXETINE 60 MG: 60 CAPSULE, DELAYED RELEASE ORAL at 08:39

## 2025-08-07 RX ADMIN — MELOXICAM 7.5 MG: 7.5 TABLET ORAL at 08:39

## 2025-08-07 RX ADMIN — POTASSIUM CHLORIDE 40 MEQ: 1500 TABLET, EXTENDED RELEASE ORAL at 08:39

## 2025-08-07 RX ADMIN — GABAPENTIN 800 MG: 400 CAPSULE ORAL at 05:08

## 2025-08-07 RX ADMIN — OXYCODONE HYDROCHLORIDE 10 MG: 10 TABLET ORAL at 08:40

## 2025-08-07 RX ADMIN — Medication 1000 MCG: at 08:41

## 2025-08-07 RX ADMIN — HEPARIN SODIUM 5000 UNITS: 5000 INJECTION INTRAVENOUS; SUBCUTANEOUS at 05:08

## 2025-08-07 RX ADMIN — METHOCARBAMOL TABLETS 500 MG: 500 TABLET, COATED ORAL at 08:40

## 2025-08-07 RX ADMIN — HYDROCHLOROTHIAZIDE 25 MG: 25 TABLET ORAL at 08:40

## 2025-08-07 NOTE — SIGNIFICANT NOTE
08/07/25 1222   PT Discharge Summary   Reason for Discharge Discharge from facility   Outcomes Achieved Patient able to partially acheive established goals   Discharge Destination Home with outpatient services

## 2025-08-07 NOTE — THERAPY DISCHARGE NOTE
Inpatient Rehabilitation - IRF Occupational Therapy Treatment Note/Discharge   Philipp     Patient Name: Ninoska Stock  : 1980  MRN: [id number]  Today's Date: 2025               Admit Date: 2025       ICD-10-CM ICD-9-CM   1. Hypersomnia  G47.10 780.54   2. Trauma  T14.90XA 959.9     Patient Active Problem List   Diagnosis    Shortness of breath    Immunization due    Abnormal CT of the chest    Cigarette nicotine dependence without complication    Hypersomnia    Trauma     Past Medical History:   Diagnosis Date    Anxiety     Asthma     Depression     Heart disease     Multiple sclerosis      Past Surgical History:   Procedure Laterality Date    CHOLECYSTECTOMY      FEMUR FRACTURE SURGERY      FOOT SURGERY      HYSTERECTOMY      KIDNEY SURGERY      TUBAL ABDOMINAL LIGATION      WRIST FRACTURE SURGERY         IRF OT ASSESSMENT FLOWSHEET (Last 12 Hours)       IRF OT Evaluation and Treatment       Row Name 25 1141          OT Time and Intention    Document Type daily treatment  -LA     Mode of Treatment occupational therapy  -LA     Total Minutes, Occupational Therapy 15  -LA     Patient Effort good  -LA       Row Name 25 1141          General Information    Patient Profile Reviewed yes  -LA     General Observations of Patient OT reviewed current levels of assist required with ADLs, safety and fall precautions with patient and patient daughter. Patient verbalized good understanding with no questions or concerns.  Good understanding of HEP verbalized and demonstrated.  -LA     Existing Precautions/Restrictions fall;cervical collar;brace on at all times  -LA       Row Name 25 1141          Positioning and Restraints    Pre-Treatment Position sitting in chair/recliner  -LA     Post Treatment Position wheelchair  -LA     In Wheelchair sitting;with family/caregiver  -LA       Row Name 25 1141          Therapy Plan Review/Discharge Plan (OT)    Anticipated Discharge Disposition (OT)  home with assist  OT reviewed d/c instructions, safety and current level of assist required with ADLs  -LA       Row Name 08/07/25 1141          LB Dressing Goal 1 (OT-IRF)    Progress/Outcomes (LB Dressing Goal 1, OT-IRF) goal met  -LA       Row Name 08/07/25 1141          LB Dressing Goal 2 (OT-IRF)    Progress/Outcomes (LB Dressing Goal 2, OT-IRF) goal met  -LA       Row Name 08/07/25 1141          Toileting Goal 1 (OT-IRF)    Progress/Outcomes (Toileting Goal 1, OT-IRF) goal met  -LA       Row Name 08/07/25 1141          Toileting Goal 2 (OT-IRF)    Progress/Outcomes (Toileting Goal 2, OT-IRF) goal met  -LA       Row Name 08/07/25 1141          Discharge Summary (OT)    Discharge Summary Statement (OT) Discharge home this date with family assist. Patient with good understanding of HEP, safety and current levels of assist. Patient daughter also verbalized understanding. No questions or concerns.  -LA               User Key  (r) = Recorded By, (t) = Taken By, (c) = Cosigned By      Initials Name Effective Dates    MANISHA SánchezRadha, OT 02/14/22 -                   Wound 07/29/25 1842 Right anterior greater trochanter Surgical (Active)   Dressing Appearance dry;intact 08/07/25 0915   Dressing Removed Type border dressing 08/06/25 2042   Closure Steri strips 08/06/25 2042   Drainage Characteristics/Odor yellow;brown;clear 08/06/25 2042   Drainage Amount scant 08/06/25 2042   Care, Wound cleansed with;sterile normal saline 08/06/25 2042   Dressing Care dressing changed;border dressing 08/06/25 2042       Wound 07/29/25 1843 Right distal wrist Surgical (Active)   Dressing Appearance other (see comments) 08/07/25 0915   Closure Unable to assess 08/07/25 0915   Base unable to visualize 08/07/25 0915       Wound 07/29/25 1844 Right anterior temporal region (Active)   Dressing Appearance open to air 08/07/25 0915   Closure Approximated 08/07/25 0915   Base dry;scab 08/07/25 0915   Drainage Amount none 08/06/25 2042        Wound 07/29/25 1849 Right lateral thigh (Active)   Dressing Appearance open to air 08/07/25 0915   Closure Steri strips 08/07/25 0915   Base dry;pink 08/07/25 0915   Drainage Amount none 08/06/25 2042       Occupational Therapy Education       Title: PT OT SLP Therapies (Done)       Topic: Occupational Therapy (Resolved)       Point: ADL training (Resolved)       Learning Progress Summary            Patient Acceptance, E,TB,D, VU,DU,NR by LA at 8/6/2025 1320    Acceptance, E,D, VU,NR by TM at 8/5/2025 1517    Acceptance, E,D, VU,NR by TM at 8/4/2025 1545                      Point: Home exercise program (Resolved)       Learning Progress Summary            Patient Acceptance, E,TB,D, VU,DU,NR by LA at 8/6/2025 1320                      Point: Precautions (Resolved)       Learning Progress Summary            Patient Acceptance, E,TB,D, VU,DU,NR by LA at 8/6/2025 1320    Acceptance, E,D, VU,NR by TM at 8/5/2025 1517    Acceptance, E,D, VU,NR by TM at 8/4/2025 1545                      Point: Body mechanics (Resolved)       Learning Progress Summary            Patient Acceptance, E,TB,D, VU,DU,NR by LA at 8/6/2025 1320                                      User Key       Initials Effective Dates Name Provider Type Discipline     06/16/21 -  Humera Perry OT Occupational Therapist OT    LA 02/14/22 -  Radha Sánchez OT Occupational Therapist OT                    OT Recommendation and Plan  Planned Therapy Interventions (OT): activity tolerance training, adaptive equipment training, BADL retraining, functional balance retraining, occupation/activity based interventions, passive ROM/stretching, patient/caregiver education/training, ROM/therapeutic exercise, strengthening exercise, transfer/mobility retraining           OT IRF GOALS       Row Name 08/07/25 1141 07/30/25 1502          LB Dressing Goal 1 (OT-IRF)    Activity/Device (LB Dressing Goal 1, OT-IRF) -- lower body dressing  -KP     [place] (LB  Dressing Goal 1, OT-IRF) -- contact guard required  -KP     Time Frame (LB Dressing Goal 1, OT-IRF) -- short-term goal (STG);1 week  -KP     Progress/Outcomes (LB Dressing Goal 1, OT-IRF) goal met  -LA new goal  -KP        LB Dressing Goal 2 (OT-IRF)    Activity/Device (LB Dressing Goal 2, OT-IRF) -- lower body dressing  -KP     [place] (LB Dressing Goal 2, OT-IRF) -- standby assist  -KP     Time Frame (LB Dressing Goal 2, OT-IRF) -- long-term goal (LTG);by discharge  -KP     Progress/Outcomes (LB Dressing Goal 2, OT-IRF) goal met  -LA new goal  -KP        Toileting Goal 1 (OT-IRF)    Activity/Device (Toileting Goal 1, OT-IRF) -- toileting skills, all  -KP     [place] Level (Toileting Goal 1, OT-IRF) -- contact guard required  -KP     Progress/Outcomes (Toileting Goal 1, OT-IRF) goal met  -LA new goal  -KP     Time Frame (Toileting Goal 1, OT-IRF) -- short-term goal (STG);1 week  -KP        Toileting Goal 2 (OT-IRF)    Activity/Device (Toileting Goal 2, OT-IRF) -- toileting skills, all  -KP     [place] Level (Toileting Goal 2, OT-IRF) -- standby assist  -KP     Progress/Outcomes (Toileting Goal 2, OT-IRF) goal met  -LA new goal  -KP     Time Frame (Toileting Goal 2, OT-IRF) -- long-term goal (LTG);by discharge  -               User Key  (r) = Recorded By, (t) = Taken By, (c) = Cosigned By      Initials Name Provider Type    Maryann Maradiaga OT Occupational Therapist    Radha Bonilla OT Occupational Therapist                        Time Calculation:    Time Calculation- OT       Row Name 08/07/25 1145             Time Calculation- OT    OT Start Time 1045  -LA      OT Stop Time 1100  -LA      OT Time Calculation (min) 15 min  -LA      Total Timed Code Minutes- OT 15 minute(s)  -LA                User Key  (r) = Recorded By, (t) = Taken By, (c) = Cosigned By      Initials Name Provider Type    LA Gonzalo, Radha, OT Occupational Therapist                    Therapy Charges for Today       Code  Description Service Date Service Provider Modifiers Qty    54738354993 HC OT SELF CARE/MGMT/TRAIN EA 15 MIN 8/6/2025 Radha Sánchez OT GO 2    02196803660 HC OT THER PROC EA 15 MIN 8/6/2025 Radha Sánchez OT GO 2    01339045475 HC OT THERAPEUTIC ACT EA 15 MIN 8/6/2025 Radha Sánchez OT GO 3    26736003186 HC OT THERAPEUTIC ACT EA 15 MIN 8/7/2025 Radha Sánchez OT GO 1                 OT Discharge Summary  Outcomes Achieved: Able to achieve all goals within established timeline  Discharge Destination: Home with assist    Radha Sánchez OT  8/7/2025

## 2025-08-07 NOTE — PLAN OF CARE
Problem: Comorbidity Management  Goal: Blood Pressure in Desired Range  Outcome: Progressing     Problem: Skin Injury Risk Increased  Goal: Skin Health and Integrity  Outcome: Progressing     Problem: Fall Injury Risk  Goal: Absence of Fall and Fall-Related Injury  Outcome: Progressing  Intervention: Promote Injury-Free Environment  Recent Flowsheet Documentation  Taken 8/7/2025 0204 by Alyssa Allison, RN  Safety Promotion/Fall Prevention:   room organization consistent   safety round/check completed   nonskid shoes/slippers when out of bed   mobility aid in reach   lighting adjusted   fall prevention program maintained   clutter free environment maintained   assistive device/personal items within reach  Taken 8/7/2025 0000 by Alyssa Allison, RN  Safety Promotion/Fall Prevention:   room organization consistent   safety round/check completed   nonskid shoes/slippers when out of bed   mobility aid in reach   lighting adjusted   fall prevention program maintained   clutter free environment maintained   assistive device/personal items within reach  Taken 8/6/2025 2200 by Alyssa Allison, RN  Safety Promotion/Fall Prevention:   room organization consistent   safety round/check completed   nonskid shoes/slippers when out of bed   mobility aid in reach   lighting adjusted   fall prevention program maintained   clutter free environment maintained   assistive device/personal items within reach  Taken 8/6/2025 2000 by Alyssa Allison, RN  Safety Promotion/Fall Prevention:   room organization consistent   safety round/check completed   nonskid shoes/slippers when out of bed   mobility aid in reach   lighting adjusted   fall prevention program maintained   clutter free environment maintained   assistive device/personal items within reach   Goal Outcome Evaluation:

## 2025-08-07 NOTE — PROGRESS NOTES
Section GG:  Mobility - Discharge Performance                                            Coding  QU6454.A  Roll Left and Right           05. Setup or clean-up assistance - Hel                                          per SETS UP or CLEANS UP; patient comp                                          letes activity. [place] assists only pr                                          ior to or following the activity.  UR5350.B  Sit to Lying                  05. Setup or clean-up assistance - Hel                                          per SETS UP or CLEANS UP; patient comp                                          letes activity. [place] assists only pr                                          ior to or following the activity.  KM5033.C  Lying to Sitting on Side of Bed  05. Setup or clean-up assistance -  Hel                                          per SETS UP or CLEANS UP; patient comp                                          letes activity. [place] assists only pr                                          ior to or following the activity.  PR7572.D  Sit to Stand                  05. Setup or clean-up assistance - Hel                                          per SETS UP or CLEANS UP; patient comp                                          letes activity. [place] assists only pr                                          ior to or following the activity.  WH8226.E  Chair/Bed-to-Chair Transfer   05. Setup or clean-up assistance - Hel                                          per SETS UP or CLEANS UP; patient comp                                          letes activity. [place] assists only pr                                          ior to or following the activity.  CA6646.F  Toilet Transfer               05. Setup or clean-up assistance - Hel                                          per SETS UP or CLEANS UP; patient comp                                          letes activity. [place] assists only pr                                           ior to or following the activity.  BF2117.G  Car Transfer                  05. Setup or clean-up assistance - Hel                                          per SETS UP or CLEANS UP; patient comp                                          letes activity. [place] assists only pr                                          ior to or following the activity.  WI5753.I  Walk 10 Feet                  04. Supervision or touching assistance                                          - [place] provides VERBAL CUES or TOUCH                                          ING/STEADYING assistance as patient co                                          mpletes activity. Assistance may be pr                                          ovided throughout the activity or inte                                          rmittently.  NZ6861.J  Walk 50 Feet with Two Turns   04. Supervision or touching assistance                                          - [place] provides VERBAL CUES or TOUCH                                          ING/STEADYING assistance as patient co                                          mpletes activity. Assistance may be pr                                          ovided throughout the activity or inte                                          rmittently.  UN2275.K  Walk 150 Feet                 04. Supervision or touching assistance                                          - [place] provides VERBAL CUES or TOUCH                                          ING/STEADYING assistance as patient co                                          mpletes activity. Assistance may be pr                                          ovided throughout the activity or inte                                          rmittently.  ER3268.L  Walking 10 Feet on Uneven Surface  88. Not attempted due to medical  condi                                          tion or safety concerns  JB0195.M  1 Step (curb)                 88. Not attempted due to medical condi       "                                    tion or safety concerns  PV3969.N  4 Steps                       88. Not attempted due to medical condi                                          tion or safety concerns  ZI4043.O  12 Steps                      88. Not attempted due to medical condi                                          tion or safety concerns  [id number].P  Picking Up Object             88. Not attempted due to medical condi                                          tion or safety concerns  HY8056.Q1  Does the patient use a wheelchair and/or scooter?  1. Yes    SF3706.R  Wheel 50 Feet with Two Turns  06. Independent - Patient completes th                                          e activity by him/herself with no assi                                          stance from a helper.  WX4618.RR1  Type of Wheelchair/Scooter for 50 Feet with Two Turns  1. Manual    XJ5151.S  Wheel 150 Feet                06. Independent - Patient completes th                                          e activity by him/herself with no assi                                          stance from a helper.  XR6420.SS1  Type of Wheelchair/Scooter for 150 Feet  1. Manual        Section J.  Health Conditions - Discharge  . Pain Effect on Sleep - ?Over the past 5 days, how much of the time has  pain made it hard for you to sleep at night??:  1. Rarely or not at all    . Pain Interference with Therapy Activities - ?Over the past 5 days, how  often have you limited your participation in rehabilitation therapy sessions due  to pain?\":  1. Rarely or not at all    . Pain Interference with Day-to-Day Activities - ?Over the past 5 days, how  often have you limited your day-to-day activities (excluding rehabilitation  therapy sessions) because of pain??:  1. Rarely or not at all    Has the patient had any falls since admission?  0. No    Signed by: Scott Bhatia PTA    "

## 2025-08-07 NOTE — PROGRESS NOTES
Section GG. Self Care - Discharge Performance                                            Coding  KZ2012.A  Eating                        06. Independent - Patient completes th                                          e activity by him/herself with no assi                                          stance from a helper.  MZ6579.B  Oral Hygiene                  06. Independent - Patient completes th                                          e activity by him/herself with no assi                                          stance from a helper.  ZK3998.C  Toileting Hygiene             06. Independent - Patient completes th                                          e activity by him/herself with no assi                                          stance from a helper.  UQ5157.E  Shower/Bathe Self             05. Setup or clean-up assistance - Hel                                          per SETS UP or CLEANS UP; patient comp                                          letes activity. [place] assists only pr                                          ior to or following the activity.  AN6458.F  Upper Body Dressing           05. Setup or clean-up assistance - Hel                                          per SETS UP or CLEANS UP; patient comp                                          letes activity. [place] assists only pr                                          ior to or following the activity.  UG1975.G  Lower Body Dressing           05. Setup or clean-up assistance - Hel                                          per SETS UP or CLEANS UP; patient comp                                          letes activity. [place] assists only pr                                          ior to or following the activity.  KJ7115.H  Putting On/Taking Off Footwear  05. Setup or clean-up assistance - Hel                                            per SETS UP or CLEANS UP; patient comp                                          letes activity. [place] assists  only pr                                          ior to or following the activity.    Signed by: Radha Sánchez, OT
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
## 2025-08-07 NOTE — SIGNIFICANT NOTE
08/07/25 1104   Plan   Plan Spoke to pt and daughter.  [facility] delivered RW with bilateral platform attachments and pt only need right side attachment.  Contacted [facility] [phone] per Ortiz about this and she says pt can keep extra attachment and will not be billed extra for this item.  Reviewed this with pt and daughter.  PT will remove left platform attachment on left side.  Reviewed outpatient PT appointment at [facility] on 8-11-25 at 2:00 pm and explained OT to be scheduled when spot is available.  Pt is returning home today with adult children.  DAMIEN Gonzales will assist pt too.  Daughter will transport pt home today.  Faxed discharge summary to [facility].   Patient/Family in Agreement with Plan yes

## 2025-08-07 NOTE — PROGRESS NOTES
"Section A. Administrative Information - Discharge        Medication List to Subsequent Provider: Not applicable.  Patient was not  discharged to a subsequent provider.      01 - Home        Reconciled Medication List to Patient on Discharge:  Yes    Route of Transmission: D. Paper-based (e.g., fax, copies, printouts)    Section B.  Hearing, Speech, and Vision - Discharge  . Health Literacy - Frequency of requiring assistance reading instructions,  pamphlets, other written material from doctor or pharmacy:  0. Never    Section C.  Cognitive Patterns - Discharge  . Should Brief Interview for Mental Status (-) be conducted?  (1) Yes        Number of words repeated by patient after first attempt:  3. Three        A.  ?Please tell me what year it is right now.?    A. Able to report correct year:  3. Correct    B.  ?What month are we in right now??    B. Able to report correct month:  2. Accurate within 5 days    C.  ?What day of the week is today??    C. Able to report correct day of the week:  1. Correct            A.  Able to recall ?sock?:  2. Yes, no cue required    B.  Able to recall ?blue?:  2. Yes, no cue required    C.  Able to recall ?bed?:  2. Yes, no cue required        BIMS Score:  15    A. Is there evidence of an acute change in mental status from the patient's  baseline?  0. No    B. Inattention:  0. Behavior not present    C. Disorganized thinkin. Behavior not present    D. Altered level of consciousness:  0. Behavior not present    Section D. Mood - Discharge  \"Over the last 2 weeks, have you been bothered by any of the following  problems?\"    . Patient Mood Interview (PHQ-2 to 9) (from [employer]?)                            1. Symptom Presence       2. Symptom Frequency  A. Little interest or pleasure in doing things  0. No                     0.  Never or 1 day  B. Feeling down, depressed, or hopeless  0. No                     0. Never or 1  day          . Total " Severity Score: 0    Interpretation: Minimal depression    How often do you feel lonely or isolated from those around you?  0. Never    Section GG. Self Care - Discharge Performance                                            Coding  XZ7749.A  Eating                        06. Independent - Patient completes th                                          e activity by him/herself with no assi                                          stance from a helper.  SL1886.B  Oral Hygiene                  06. Independent - Patient completes th                                          e activity by him/herself with no assi                                          stance from a helper.  PL2564.C  Toileting Hygiene             06. Independent - Patient completes th                                          e activity by him/herself with no assi                                          stance from a helper.  JF7156.E  Shower/Bathe Self             05. Setup or clean-up assistance - Hel                                          per SETS UP or CLEANS UP; patient comp                                          letes activity. Gideon assists only pr                                          ior to or following the activity.  VI9827.F  Upper Body Dressing           05. Setup or clean-up assistance - Hel                                          per SETS UP or CLEANS UP; patient comp                                          letes activity. Gideon assists only pr                                          ior to or following the activity.  JH4351.G  Lower Body Dressing           05. Setup or clean-up assistance - Hel                                          per SETS UP or CLEANS UP; patient comp                                          letes activity. Gideon assists only pr                                          ior to or following the activity.  XL3895.H  Putting On/Taking Off Footwear  05. Setup or clean-up assistance - Hel                        "                     per SETS UP or CLEANS UP; patient comp                                          letes activity. Solen assists only pr                                          ior to or following the activity.      Section J.  Health Conditions - Discharge  . Pain Effect on Sleep - ?Over the past 5 days, how much of the time has  pain made it hard for you to sleep at night??:  1. Rarely or not at all    . Pain Interference with Therapy Activities - ?Over the past 5 days, how  often have you limited your participation in rehabilitation therapy sessions due  to pain?\":  1. Rarely or not at all    . Pain Interference with Day-to-Day Activities - ?Over the past 5 days, how  often have you limited your day-to-day activities (excluding rehabilitation  therapy sessions) because of pain??:  1. Rarely or not at all    Has the patient had any falls since admission?  0. No    Section M. Skin Conditions - Discharge  Does this patient have one or more unhealed pressure ulcers/injuries?  0. No    Signed by: Pratima Ruiz Nurse    "

## 2025-08-07 NOTE — SIGNIFICANT NOTE
08/07/25 0932   Plan   Plan Faxed MD order for DME to [facility].  Faxed ambulatory referrals for outpatient PT/OT to [facility] [phone].   Final Discharge Disposition Code 01 - home or self-care

## 2025-08-07 NOTE — PROGRESS NOTES
Problems/Goals  Skin Integrity (Body Function Structure)  Current Status: risk for impaired skin integrity  Long Term Goals  07/29/2025 06:04 PM - Active  no skin breakdown  Potential for Injury (Safety)  Current Status: risk for falls  Short Term Goals  07/29/2025 06:06 PM - Active  no falls  Long Term Goals  07/29/2025 06:05 PM - Active  no falls    Signed by: Radha Kan RN

## 2025-08-07 NOTE — THERAPY DISCHARGE NOTE
Inpatient Rehabilitation - Physical Therapy Treatment Note/Discharge   [place]     Patient Name: Ninoska Stock  : 1980  MRN: [id number]  Today's Date: 2025                Admit Date: 2025    Visit Dx:    ICD-10-CM ICD-9-CM   1. Hypersomnia  G47.10 780.54   2. Trauma  T14.90XA 959.9     Patient Active Problem List   Diagnosis    Shortness of breath    Immunization due    Abnormal CT of the chest    Cigarette nicotine dependence without complication    Hypersomnia    Trauma     Past Medical History:   Diagnosis Date    Anxiety     Asthma     Depression     Heart disease     Multiple sclerosis      Past Surgical History:   Procedure Laterality Date    CHOLECYSTECTOMY      FEMUR FRACTURE SURGERY      FOOT SURGERY      HYSTERECTOMY      KIDNEY SURGERY      TUBAL ABDOMINAL LIGATION      WRIST FRACTURE SURGERY         PT ASSESSMENT (Last 12 Hours)       IRF PT Evaluation and Treatment       Row Name 25 1219          PT Time and Intention    Document Type discharge evaluation  -RG     Mode of Treatment physical therapy  -RG     Patient/Family/Caregiver Comments/Observations Pt DC to home with outpatient services. Pt given HEP, Home safety, and GTB. Pt had no questions or concerns.  -RG       Row Name 25 1219          Bed Mobility Goal 1 (PT-IRF)    Progress/Outcomes (Bed Mobility Goal 1, PT-IRF) goal met  -RG       Row Name 25 1219          Transfer Goal 1 (PT-IRF)    Progress/Outcomes (Transfer Goal 1, PT-IRF) goal met  -RG       Row Name 25 1219          Gait/Walking Locomotion Goal 1 (PT-IRF)    Progress/Outcomes (Gait/Walking Locomotion Goal 1, PT-IRF) goal partially met  -RG               User Key  (r) = Recorded By, (t) = Taken By, (c) = Cosigned By      Initials Name Provider Type    RG Scott Bhatia PTA Physical Therapist Assistant                    Physical Therapy Education       Title: PT OT SLP Therapies (Resolved)       Topic: Physical Therapy (Resolved)        Point: Mobility training (Resolved)       Learning Progress Summary            Patient Acceptance, E,D,H, VU,DU by RG at 8/7/2025 1222    Acceptance, E,D, VU,NR by RG at 8/6/2025 1543    Acceptance, E,D, VU by LL at 8/6/2025 1319    Acceptance, E,TB, VU,DU by HC at 8/5/2025 1555    Acceptance, E,D, VU,NR by RG at 8/5/2025 1541    Acceptance, E,TB, VU,DU by HC at 8/4/2025 1518    Acceptance, E,D, VU,NR by RG at 8/4/2025 1515    Acceptance, E,D, VU,NR by RG at 8/2/2025 1514    Acceptance, E, VU,DU by HC at 8/1/2025 1558    Acceptance, E,D, VU,NR by RG at 8/1/2025 1540    Acceptance, E,D, VU,NR by RG at 7/31/2025 1504    Acceptance, E, VU,NR by LB at 7/30/2025 1205   Family Acceptance, E,D,H, VU,DU by RG at 8/7/2025 1222                      Point: Home exercise program (Resolved)       Learning Progress Summary            Patient Acceptance, E,D,H, VU,DU by RG at 8/7/2025 1222    Acceptance, E,D, VU,NR by RG at 8/6/2025 1543    Acceptance, E,D, VU by LL at 8/6/2025 1319    Acceptance, E,TB, VU,DU by HC at 8/5/2025 1555    Acceptance, E,D, VU,NR by RG at 8/5/2025 1541    Acceptance, E,TB, VU,DU by HC at 8/4/2025 1518    Acceptance, E,D, VU,NR by RG at 8/4/2025 1515    Acceptance, E,D, VU,NR by RG at 8/2/2025 1514    Acceptance, E, VU,DU by HC at 8/1/2025 1558    Acceptance, E,D, VU,NR by RG at 8/1/2025 1540    Acceptance, E,D, VU,NR by RG at 7/31/2025 1504    Acceptance, E, VU,NR by LB at 7/30/2025 1205   Family Acceptance, E,D,H, VU,DU by RG at 8/7/2025 1222                      Point: Body mechanics (Resolved)       Learning Progress Summary            Patient Acceptance, E,D,H, VU,DU by RG at 8/7/2025 1222    Acceptance, E,D, VU,NR by RG at 8/6/2025 1543    Acceptance, E,D, VU by  at 8/6/2025 1319    Acceptance, E,TB, VU,DU by HC at 8/5/2025 1555    Acceptance, E,D, VU,NR by RG at 8/5/2025 1541    Acceptance, E,TB, VU,DU by HC at 8/4/2025 1518    Acceptance, E,D, VU,NR by RG at 8/4/2025 1515    Acceptance, E,D,  VU,NR by RG at 8/2/2025 1514    Acceptance, E, VU,DU by HC at 8/1/2025 1558    Acceptance, E,D, VU,NR by RG at 8/1/2025 1540    Acceptance, E,D, VU,NR by RG at 7/31/2025 1504    Acceptance, E, VU,NR by  at 7/30/2025 1205   Family Acceptance, E,D,H, VU,DU by RG at 8/7/2025 1222                      Point: Precautions (Resolved)       Learning Progress Summary            Patient Acceptance, E,D,H, VU,DU by RG at 8/7/2025 1222    Acceptance, E,D, VU,NR by RG at 8/6/2025 1543    Acceptance, E,D, VU by  at 8/6/2025 1319    Acceptance, E,TB, VU,DU by HC at 8/5/2025 1555    Acceptance, E,D, VU,NR by RG at 8/5/2025 1541    Acceptance, E,TB, VU,DU by HC at 8/4/2025 1518    Acceptance, E,D, VU,NR by RG at 8/4/2025 1515    Acceptance, E,D, VU,NR by RG at 8/2/2025 1514    Acceptance, E, VU,DU by  at 8/1/2025 1558    Acceptance, E,D, VU,NR by RG at 8/1/2025 1540    Acceptance, E,D, VU,NR by RG at 7/31/2025 1504    Acceptance, E, VU,NR by LB at 7/30/2025 1205   Family Acceptance, E,D,H, VU,DU by RG at 8/7/2025 1222                                      User Key       Initials Effective Dates Name Provider Type Discipline    LB 06/16/21 -  Nicolette Pierce, PT Physical Therapist PT    LL 05/02/16 -  Deb Maya PTA Physical Therapist Assistant PT    RG 06/16/21 -  Scott Bhatia PTA Physical Therapist Assistant PT     01/20/23 -  Maryam Wills PTA Physical Therapist Assistant PT                    PT Recommendation and Plan  Frequency of Treatment (PT): 5 times per week  Anticipated Equipment Needs at Discharge (PT Eval):  (tbd)            Time Calculation:    PT Charges       Row Name 08/07/25 1222             Time Calculation    PT Received On 08/07/25  -RG         Time Calculation- PT    Total Timed Code Minutes- PT 15 minute(s)  -RG                User Key  (r) = Recorded By, (t) = Taken By, (c) = Cosigned By      Initials Name Provider Type    Scott Hahn PTA Physical Therapist Assistant                     Therapy Charges for Today       Code Description Service Date Service Provider Modifiers Qty    48332884249 HC PT THERAPEUTIC ACT EA 15 MIN 8/6/2025 Scott Bhatia, MAKENZIE GP, CQ 1    55615680755 HC PT THER PROC EA 15 MIN 8/6/2025 Scott Bhatia PTA GP, CQ 2    89377779556 HC PT THERAPEUTIC ACT EA 15 MIN 8/7/2025 Scott Bhatia PTA GP, CQ 1            PT G-Codes  AM-PAC 6 Clicks Score (PT): 17    PT Discharge Summary  Reason for Discharge: Discharge from facility  Outcomes Achieved: Patient able to partially acheive established goals  Discharge Destination: Home with outpatient services    Scott Bhatia PTA  8/7/2025

## 2025-08-07 NOTE — PROGRESS NOTES
"Section A. Administrative Information - Discharge      Has lack of transportation kept patient from medical appointments, meetings,  work, or from getting things needed for daily living?  C. No      Medication List to Subsequent Provider: Not applicable.  Patient was not  discharged to a subsequent provider.      01 - Home        Reconciled Medication List to Patient on Discharge:  Yes    Route of Transmission: D. Paper-based (e.g., fax, copies, printouts)    Section B.  Hearing, Speech, and Vision - Discharge  . Health Literacy - Frequency of requiring assistance reading instructions,  pamphlets, other written material from doctor or pharmacy:  0. Never    Section C.  Cognitive Patterns - Discharge  . Should Brief Interview for Mental Status (-) be conducted?  (1) Yes        Number of words repeated by patient after first attempt:  3. Three        A.  ?Please tell me what year it is right now.?    A. Able to report correct year:  3. Correct    B.  ?What month are we in right now??    B. Able to report correct month:  2. Accurate within 5 days    C.  ?What day of the week is today??    C. Able to report correct day of the week:  1. Correct            A.  Able to recall ?sock?:  2. Yes, no cue required    B.  Able to recall ?blue?:  2. Yes, no cue required    C.  Able to recall ?bed?:  2. Yes, no cue required        BIMS Score:  15    A. Is there evidence of an acute change in mental status from the patient's  baseline?  0. No    B. Inattention:  0. Behavior not present    C. Disorganized thinkin. Behavior not present    D. Altered level of consciousness:  0. Behavior not present    Section D. Mood - Discharge  \"Over the last 2 weeks, have you been bothered by any of the following  problems?\"    . Patient Mood Interview (PHQ-2 to 9) (from [employer]?)                            1. Symptom Presence       2. Symptom Frequency  A. Little interest or pleasure in doing things  0. No          "            0.  Never or 1 day  B. Feeling down, depressed, or hopeless  0. No                     0. Never or 1  day            PHQ interview ended, as above answers do not meet criteria to continue    . Total Severity Score: 0    Interpretation: Minimal depression    How often do you feel lonely or isolated from those around you?  0. Never    Section GG. Self Care - Discharge Performance                                            Coding  SK8308.A  Eating                        06. Independent - Patient completes th                                          e activity by him/herself with no assi                                          stance from a helper.  MV7779.B  Oral Hygiene                  06. Independent - Patient completes th                                          e activity by him/herself with no assi                                          stance from a helper.  EE2304.C  Toileting Hygiene             06. Independent - Patient completes th                                          e activity by him/herself with no assi                                          stance from a helper.  KD0194.E  Shower/Bathe Self             05. Setup or clean-up assistance - Hel                                          per SETS UP or CLEANS UP; patient comp                                          letes activity. [place] assists only pr                                          ior to or following the activity.  EI0947.F  Upper Body Dressing           05. Setup or clean-up assistance - Hel                                          per SETS UP or CLEANS UP; patient comp                                          letes activity. [place] assists only pr                                          ior to or following the activity.  TR2325.G  Lower Body Dressing           05. Setup or clean-up assistance - Hel                                          per SETS UP or CLEANS UP; patient comp                                           letes activity. [place] assists only pr                                          ior to or following the activity.  SM3656.H  Putting On/Taking Off Footwear  05. Setup or clean-up assistance - Hel                                            per SETS UP or CLEANS UP; patient comp                                          letes activity. [place] assists only pr                                          ior to or following the activity.      Section GG:  Mobility - Discharge Performance                                            Coding  TH0151.A  Roll Left and Right           05. Setup or clean-up assistance - Hel                                          per SETS UP or CLEANS UP; patient comp                                          letes activity. [place] assists only pr                                          ior to or following the activity.  EB0108.B  Sit to Lying                  05. Setup or clean-up assistance - Hel                                          per SETS UP or CLEANS UP; patient comp                                          letes activity. [place] assists only pr                                          ior to or following the activity.  UV0607.C  Lying to Sitting on Side of Bed  05. Setup or clean-up assistance -  Hel                                          per SETS UP or CLEANS UP; patient comp                                          letes activity. [place] assists only pr                                          ior to or following the activity.  CC1131.D  Sit to Stand                  05. Setup or clean-up assistance - Hel                                          per SETS UP or CLEANS UP; patient comp                                          letes activity. [place] assists only pr                                          ior to or following the activity.  RX2642.E  Chair/Bed-to-Chair Transfer   05. Setup or clean-up assistance - Hel                                          per SETS UP or CLEANS UP;  patient comp                                          letes activity. [place] assists only pr                                          ior to or following the activity.  HW2858.F  Toilet Transfer               05. Setup or clean-up assistance - Hel                                          per SETS UP or CLEANS UP; patient comp                                          letes activity. [place] assists only pr                                          ior to or following the activity.  DR8994.G  Car Transfer                  05. Setup or clean-up assistance - Hel                                          per SETS UP or CLEANS UP; patient comp                                          letes activity. [place] assists only pr                                          ior to or following the activity.  MX8873.I  Walk 10 Feet                  04. Supervision or touching assistance                                          - [place] provides VERBAL CUES or TOUCH                                          ING/STEADYING assistance as patient co                                          mpletes activity. Assistance may be pr                                          ovided throughout the activity or inte                                          rmittently.  TF9241.J  Walk 50 Feet with Two Turns   04. Supervision or touching assistance                                          - [place] provides VERBAL CUES or TOUCH                                          ING/STEADYING assistance as patient co                                          mpletes activity. Assistance may be pr                                          ovided throughout the activity or inte                                          rmittently.  BM2509.K  Walk 150 Feet                 04. Supervision or touching assistance                                          - [place] provides VERBAL CUES or TOUCH                                          ING/STEADYING assistance as patient co                                           mpletes activity. Assistance may be pr                                          ovided throughout the activity or inte                                          rmittently.  QU1109.L  Walking 10 Feet on Uneven Surface  88. Not attempted due to medical  condi                                          tion or safety concerns  OM4058.M  1 Step (curb)                 88. Not attempted due to medical condi                                          tion or safety concerns  VE1889.N  4 Steps                       88. Not attempted due to medical condi                                          tion or safety concerns  YM3240.O  12 Steps                      88. Not attempted due to medical condi                                          tion or safety concerns  IP3303.P  Picking Up Object             88. Not attempted due to medical condi                                          tion or safety concerns  LK6004.Q1  Does the patient use a wheelchair and/or scooter?  1. Yes    FT5023.R  Wheel 50 Feet with Two Turns  06. Independent - Patient completes th                                          e activity by him/herself with no assi                                          stance from a helper.  JN2063.RR1  Type of Wheelchair/Scooter for 50 Feet with Two Turns  1. Manual    AC9641.S  Wheel 150 Feet                06. Independent - Patient completes th                                          e activity by him/herself with no assi                                          stance from a helper.  QD8250.SS1  Type of Wheelchair/Scooter for 150 Feet  1. Manual        Section J.  Health Conditions - Discharge  . Pain Effect on Sleep - ?Over the past 5 days, how much of the time has  pain made it hard for you to sleep at night??:  1. Rarely or not at all    . Pain Interference with Therapy Activities - ?Over the past 5 days, how  often have you limited your participation in rehabilitation  "therapy sessions due  to pain?\":  1. Rarely or not at all    . Pain Interference with Day-to-Day Activities - ?Over the past 5 days, how  often have you limited your day-to-day activities (excluding rehabilitation  therapy sessions) because of pain??:  1. Rarely or not at all    Has the patient had any falls since admission?  0. No    Section K.  Swallowing/Nutritional Status - Discharge  . Nutritional Approaches Received Last 7 Days:  Z. None    . Nutritional Approaches Received At Discharge:  Z. None    Section M. Skin Conditions - Discharge  Does this patient have one or more unhealed pressure ulcers/injuries?  0. No    Section N. Medication - Discharge      Did the facility contact and complete physician (or physician-designee)  prescribed/recommended actions by midnight of the next calendar day each time  potential clinically significant medication issues were identified since the  admission?  9. Not applicable - There were no potential clinically significant medication  issues identified since admission or patient is not taking any medications.    Section O. Special Treatments, Procedures, and Programs - Discharge      C1. Oxygen Therapy, C3. Intermittent    Vaccination Up to Date?  No, patient is not up to date    Signed by: Asha August, Supervisor    "

## 2025-08-09 NOTE — PROGRESS NOTES
Team Members Attending Conference    Name                                    Professional Designation    Dr. Seven Gustafson                            Physician  Nicolette Pierce, PT                         Physical Therapist  Humera Perry, OT                       Occupational Therapist  Katalina Fang, RN                    Nurse  Daniella Staples, MSW                        Patient Information    YOB: 1980      Gender:  Female    Primary Language: English    Preferred Language: English    Admission Date/Time  07/29/2025 05:52 PM    Rehab Diagnosis/Condition  Diagnosis/Conditions that caused the need for rehabilitation.      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, and we expect her to continue in Acute  Rehab.  Her medical prognosis is expected to be good.    Rehabilitation Potential        Based on the patient's PLOF and current therapy levels the patient's  rehabilitation potential is good.    Rehabilitation Therapy Program  Expected Participation in Rehabilitation Program:  At least 3 hours per day at least 5 days per week    Anticipated Interventions                        Expected Intensity (hours/day)  Expected Frequency  (days/week)  Expected Duration (total # days/IRF stay)  Physical Therapy    1.5                 5                   14  Occupational Therapy  1.5                 5                   14        Other Disciplines Participating in Plan of Care:  Case Management, Respiratory  Therapy, Recreational Therapist, Nursing    Estimated Length of Stay  Estimated Length of Stay:  14 days    Estimated Discharge Date:   08/07/2025    Anticipated Discharge Destination  Anticipated Discharge Destination:  To community with assistance    Discharge Destination Information:  Patient will  discharge home with family to assist if needed.    Problems/Goals  Mobility Discussion    PT - Bed/Chair/Wheelchair (Mobility)  Current Status: min A  Long Term Goals  07/30/2025 03:16 PM - Active  MI  PT - Walk (Mobility)  Current Status: amb 120' RW min A  Long Term Goals  07/30/2025 03:17 PM - Active  amb 300' RW MI  Self Care Discussion    OT - Dressing (Lower) (Self Care)  Current Status: min/mod a  Long Term Goals  07/30/2025 03:19 PM - Active  SBA  Body Function Structure Discussion    NURS - Skin Integrity (Body Function Structure)  Current Status: risk for impaired skin integrity  Long Term Goals  07/29/2025 06:04 PM - Active  no skin breakdown  Safety Discussion    NURS - Potential for Injury (Safety)  Current Status: risk for falls  Long Term Goals  07/29/2025 06:05 PM - Active  no falls    Physician Concurrence    I attended the weekly team conference documented above and agree with the  documented findings, discussion and decisions, and current plan of care.    Signed by: Seven Gustafson, Physician    Physician CoSigned By: Seven Gustafson 08/09/2025 07:13:00